# Patient Record
Sex: FEMALE | Race: WHITE | NOT HISPANIC OR LATINO | URBAN - METROPOLITAN AREA
[De-identification: names, ages, dates, MRNs, and addresses within clinical notes are randomized per-mention and may not be internally consistent; named-entity substitution may affect disease eponyms.]

---

## 2015-12-03 VITALS — HEIGHT: 70 IN | DIASTOLIC BLOOD PRESSURE: 70 MMHG | SYSTOLIC BLOOD PRESSURE: 130 MMHG | HEART RATE: 72 BPM

## 2017-08-07 ENCOUNTER — OUTPATIENT (OUTPATIENT)
Dept: OUTPATIENT SERVICES | Facility: HOSPITAL | Age: 77
LOS: 1 days | Discharge: HOME | End: 2017-08-07

## 2017-08-07 DIAGNOSIS — I11.9 HYPERTENSIVE HEART DISEASE WITHOUT HEART FAILURE: ICD-10-CM

## 2017-08-07 DIAGNOSIS — E11.9 TYPE 2 DIABETES MELLITUS WITHOUT COMPLICATIONS: ICD-10-CM

## 2017-08-07 DIAGNOSIS — Z02.9 ENCOUNTER FOR ADMINISTRATIVE EXAMINATIONS, UNSPECIFIED: ICD-10-CM

## 2017-09-08 ENCOUNTER — OUTPATIENT (OUTPATIENT)
Dept: OUTPATIENT SERVICES | Facility: HOSPITAL | Age: 77
LOS: 1 days | Discharge: HOME | End: 2017-09-08

## 2017-09-08 DIAGNOSIS — E11.9 TYPE 2 DIABETES MELLITUS WITHOUT COMPLICATIONS: ICD-10-CM

## 2017-09-08 DIAGNOSIS — I11.9 HYPERTENSIVE HEART DISEASE WITHOUT HEART FAILURE: ICD-10-CM

## 2018-03-06 ENCOUNTER — OUTPATIENT (OUTPATIENT)
Dept: OUTPATIENT SERVICES | Facility: HOSPITAL | Age: 78
LOS: 1 days | Discharge: HOME | End: 2018-03-06

## 2018-03-06 DIAGNOSIS — R94.31 ABNORMAL ELECTROCARDIOGRAM [ECG] [EKG]: ICD-10-CM

## 2018-03-06 DIAGNOSIS — I10 ESSENTIAL (PRIMARY) HYPERTENSION: ICD-10-CM

## 2018-11-02 ENCOUNTER — OUTPATIENT (OUTPATIENT)
Dept: OUTPATIENT SERVICES | Facility: HOSPITAL | Age: 78
LOS: 1 days | Discharge: HOME | End: 2018-11-02

## 2018-11-02 DIAGNOSIS — E61.1 IRON DEFICIENCY: ICD-10-CM

## 2018-11-02 DIAGNOSIS — G47.9 SLEEP DISORDER, UNSPECIFIED: ICD-10-CM

## 2018-11-02 DIAGNOSIS — D50.9 IRON DEFICIENCY ANEMIA, UNSPECIFIED: ICD-10-CM

## 2018-11-02 DIAGNOSIS — Z79.899 OTHER LONG TERM (CURRENT) DRUG THERAPY: ICD-10-CM

## 2018-11-02 DIAGNOSIS — E55.9 VITAMIN D DEFICIENCY, UNSPECIFIED: ICD-10-CM

## 2018-11-02 DIAGNOSIS — R26.0 ATAXIC GAIT: ICD-10-CM

## 2018-11-02 DIAGNOSIS — E53.8 DEFICIENCY OF OTHER SPECIFIED B GROUP VITAMINS: ICD-10-CM

## 2018-11-02 DIAGNOSIS — N39.0 URINARY TRACT INFECTION, SITE NOT SPECIFIED: ICD-10-CM

## 2019-01-09 ENCOUNTER — OUTPATIENT (OUTPATIENT)
Dept: OUTPATIENT SERVICES | Facility: HOSPITAL | Age: 79
LOS: 1 days | Discharge: HOME | End: 2019-01-09

## 2019-01-09 DIAGNOSIS — R78.81 BACTEREMIA: ICD-10-CM

## 2019-01-16 ENCOUNTER — OUTPATIENT (OUTPATIENT)
Dept: OUTPATIENT SERVICES | Facility: HOSPITAL | Age: 79
LOS: 1 days | Discharge: HOME | End: 2019-01-16

## 2019-01-16 DIAGNOSIS — R78.81 BACTEREMIA: ICD-10-CM

## 2019-02-13 ENCOUNTER — OUTPATIENT (OUTPATIENT)
Dept: OUTPATIENT SERVICES | Facility: HOSPITAL | Age: 79
LOS: 1 days | Discharge: HOME | End: 2019-02-13

## 2019-02-13 DIAGNOSIS — R78.81 BACTEREMIA: ICD-10-CM

## 2019-02-21 ENCOUNTER — OUTPATIENT (OUTPATIENT)
Dept: OUTPATIENT SERVICES | Facility: HOSPITAL | Age: 79
LOS: 1 days | Discharge: HOME | End: 2019-02-21

## 2019-02-21 DIAGNOSIS — R78.81 BACTEREMIA: ICD-10-CM

## 2019-02-28 ENCOUNTER — OUTPATIENT (OUTPATIENT)
Dept: OUTPATIENT SERVICES | Facility: HOSPITAL | Age: 79
LOS: 1 days | Discharge: HOME | End: 2019-02-28

## 2019-02-28 DIAGNOSIS — R78.81 BACTEREMIA: ICD-10-CM

## 2019-03-06 ENCOUNTER — OUTPATIENT (OUTPATIENT)
Dept: OUTPATIENT SERVICES | Facility: HOSPITAL | Age: 79
LOS: 1 days | Discharge: HOME | End: 2019-03-06

## 2019-03-06 DIAGNOSIS — M25.571 PAIN IN RIGHT ANKLE AND JOINTS OF RIGHT FOOT: ICD-10-CM

## 2019-03-06 DIAGNOSIS — R78.81 BACTEREMIA: ICD-10-CM

## 2019-03-13 ENCOUNTER — OUTPATIENT (OUTPATIENT)
Dept: OUTPATIENT SERVICES | Facility: HOSPITAL | Age: 79
LOS: 1 days | Discharge: HOME | End: 2019-03-13

## 2019-03-13 DIAGNOSIS — R78.81 BACTEREMIA: ICD-10-CM

## 2019-03-14 ENCOUNTER — OUTPATIENT (OUTPATIENT)
Dept: OUTPATIENT SERVICES | Facility: HOSPITAL | Age: 79
LOS: 1 days | Discharge: HOME | End: 2019-03-14

## 2019-03-14 DIAGNOSIS — R50.9 FEVER, UNSPECIFIED: ICD-10-CM

## 2019-03-15 ENCOUNTER — OUTPATIENT (OUTPATIENT)
Dept: OUTPATIENT SERVICES | Facility: HOSPITAL | Age: 79
LOS: 1 days | Discharge: HOME | End: 2019-03-15

## 2019-03-16 DIAGNOSIS — N39.0 URINARY TRACT INFECTION, SITE NOT SPECIFIED: ICD-10-CM

## 2019-03-20 ENCOUNTER — OUTPATIENT (OUTPATIENT)
Dept: OUTPATIENT SERVICES | Facility: HOSPITAL | Age: 79
LOS: 1 days | Discharge: HOME | End: 2019-03-20

## 2019-03-20 DIAGNOSIS — R78.81 BACTEREMIA: ICD-10-CM

## 2019-03-27 ENCOUNTER — OUTPATIENT (OUTPATIENT)
Dept: OUTPATIENT SERVICES | Facility: HOSPITAL | Age: 79
LOS: 1 days | Discharge: HOME | End: 2019-03-27

## 2019-03-27 DIAGNOSIS — R78.81 BACTEREMIA: ICD-10-CM

## 2019-04-10 ENCOUNTER — OUTPATIENT (OUTPATIENT)
Dept: OUTPATIENT SERVICES | Facility: HOSPITAL | Age: 79
LOS: 1 days | Discharge: HOME | End: 2019-04-10

## 2019-04-10 DIAGNOSIS — R78.81 BACTEREMIA: ICD-10-CM

## 2019-04-24 ENCOUNTER — OUTPATIENT (OUTPATIENT)
Dept: OUTPATIENT SERVICES | Facility: HOSPITAL | Age: 79
LOS: 1 days | Discharge: HOME | End: 2019-04-24

## 2019-04-24 DIAGNOSIS — R78.81 BACTEREMIA: ICD-10-CM

## 2019-05-08 ENCOUNTER — OUTPATIENT (OUTPATIENT)
Dept: OUTPATIENT SERVICES | Facility: HOSPITAL | Age: 79
LOS: 1 days | Discharge: HOME | End: 2019-05-08

## 2019-05-08 DIAGNOSIS — R78.81 BACTEREMIA: ICD-10-CM

## 2019-05-15 ENCOUNTER — OUTPATIENT (OUTPATIENT)
Dept: OUTPATIENT SERVICES | Facility: HOSPITAL | Age: 79
LOS: 1 days | Discharge: HOME | End: 2019-05-15

## 2019-05-15 DIAGNOSIS — R78.81 BACTEREMIA: ICD-10-CM

## 2019-05-22 ENCOUNTER — OUTPATIENT (OUTPATIENT)
Dept: OUTPATIENT SERVICES | Facility: HOSPITAL | Age: 79
LOS: 1 days | Discharge: HOME | End: 2019-05-22

## 2019-05-22 DIAGNOSIS — R78.81 BACTEREMIA: ICD-10-CM

## 2019-06-05 ENCOUNTER — OUTPATIENT (OUTPATIENT)
Dept: OUTPATIENT SERVICES | Facility: HOSPITAL | Age: 79
LOS: 1 days | Discharge: HOME | End: 2019-06-05

## 2019-06-05 DIAGNOSIS — R78.81 BACTEREMIA: ICD-10-CM

## 2019-06-19 ENCOUNTER — OUTPATIENT (OUTPATIENT)
Dept: OUTPATIENT SERVICES | Facility: HOSPITAL | Age: 79
LOS: 1 days | Discharge: HOME | End: 2019-06-19

## 2019-06-19 DIAGNOSIS — R78.81 BACTEREMIA: ICD-10-CM

## 2019-07-03 ENCOUNTER — OUTPATIENT (OUTPATIENT)
Dept: OUTPATIENT SERVICES | Facility: HOSPITAL | Age: 79
LOS: 1 days | Discharge: HOME | End: 2019-07-03

## 2019-07-03 DIAGNOSIS — R78.81 BACTEREMIA: ICD-10-CM

## 2019-07-10 ENCOUNTER — OUTPATIENT (OUTPATIENT)
Dept: OUTPATIENT SERVICES | Facility: HOSPITAL | Age: 79
LOS: 1 days | Discharge: HOME | End: 2019-07-10

## 2019-07-10 DIAGNOSIS — R78.81 BACTEREMIA: ICD-10-CM

## 2019-09-12 ENCOUNTER — OUTPATIENT (OUTPATIENT)
Dept: OUTPATIENT SERVICES | Facility: HOSPITAL | Age: 79
LOS: 1 days | Discharge: HOME | End: 2019-09-12

## 2019-09-12 DIAGNOSIS — E78.00 PURE HYPERCHOLESTEROLEMIA, UNSPECIFIED: ICD-10-CM

## 2019-09-12 DIAGNOSIS — E11.9 TYPE 2 DIABETES MELLITUS WITHOUT COMPLICATIONS: ICD-10-CM

## 2019-09-12 DIAGNOSIS — I11.9 HYPERTENSIVE HEART DISEASE WITHOUT HEART FAILURE: ICD-10-CM

## 2020-02-11 ENCOUNTER — OUTPATIENT (OUTPATIENT)
Dept: OUTPATIENT SERVICES | Facility: HOSPITAL | Age: 80
LOS: 1 days | Discharge: HOME | End: 2020-02-11

## 2020-02-11 DIAGNOSIS — S82.301A UNSPECIFIED FRACTURE OF LOWER END OF RIGHT TIBIA, INITIAL ENCOUNTER FOR CLOSED FRACTURE: ICD-10-CM

## 2020-03-12 ENCOUNTER — OUTPATIENT (OUTPATIENT)
Dept: OUTPATIENT SERVICES | Facility: HOSPITAL | Age: 80
LOS: 1 days | Discharge: HOME | End: 2020-03-12

## 2020-03-12 DIAGNOSIS — S82.301S: ICD-10-CM

## 2020-03-12 DIAGNOSIS — S82.301A UNSPECIFIED FRACTURE OF LOWER END OF RIGHT TIBIA, INITIAL ENCOUNTER FOR CLOSED FRACTURE: ICD-10-CM

## 2020-06-08 PROBLEM — Z00.00 ENCOUNTER FOR PREVENTIVE HEALTH EXAMINATION: Status: ACTIVE | Noted: 2020-06-08

## 2020-06-23 ENCOUNTER — TRANSCRIPTION ENCOUNTER (OUTPATIENT)
Age: 80
End: 2020-06-23

## 2020-06-23 ENCOUNTER — APPOINTMENT (OUTPATIENT)
Dept: GASTROENTEROLOGY | Facility: CLINIC | Age: 80
End: 2020-06-23
Payer: MEDICARE

## 2020-06-23 DIAGNOSIS — Z12.11 ENCOUNTER FOR SCREENING FOR MALIGNANT NEOPLASM OF COLON: ICD-10-CM

## 2020-06-23 DIAGNOSIS — Z78.9 OTHER SPECIFIED HEALTH STATUS: ICD-10-CM

## 2020-06-23 DIAGNOSIS — Z86.79 PERSONAL HISTORY OF OTHER DISEASES OF THE CIRCULATORY SYSTEM: ICD-10-CM

## 2020-06-23 DIAGNOSIS — Z80.0 FAMILY HISTORY OF MALIGNANT NEOPLASM OF DIGESTIVE ORGANS: ICD-10-CM

## 2020-06-23 PROCEDURE — 99204 OFFICE O/P NEW MOD 45 MIN: CPT | Mod: 95

## 2020-06-23 RX ORDER — ASPIRIN 81 MG
81 TABLET, DELAYED RELEASE (ENTERIC COATED) ORAL
Refills: 0 | Status: ACTIVE | COMMUNITY

## 2020-06-23 RX ORDER — POLYETHYLENE GLYCOL 3350 17 G/17G
17 POWDER, FOR SOLUTION ORAL TWICE DAILY
Qty: 1 | Refills: 6 | Status: ACTIVE | COMMUNITY
Start: 2020-06-23 | End: 1900-01-01

## 2020-06-23 RX ORDER — CHOLECALCIFEROL (VITAMIN D3) 25 MCG
TABLET ORAL
Refills: 0 | Status: ACTIVE | COMMUNITY

## 2020-06-23 NOTE — PHYSICAL EXAM
[General Appearance - Alert] : alert [Sclera] : the sclera and conjunctiva were normal [] : no respiratory distress [Oriented To Time, Place, And Person] : oriented to person, place, and time

## 2020-06-23 NOTE — ASSESSMENT
[FreeTextEntry1] : 80 year old female patient average risk for CRC, never had a colonoscopy in her life, presents with worsening constipation over last 2 years. No blood in stools, or abdominal pain. Reports decreased appetite because of depression and this has led to mild weight loss. No nausea, vomiting or reflux. \par \par A/P: \par Worsening constipation \par r/o colon cancer vs sedentary lifestyle\par Needs colonoscopy\par Risks and benefits discussed with patient. \par Pt is not keen now\par Rec: colace 300 at bedtimne\par miralax 2 scoops daily.\par RTC in 4 weeks to re evaluate for colonoscopy.

## 2020-06-23 NOTE — HISTORY OF PRESENT ILLNESS
[Home] : at home, [unfilled] , at the time of the visit. [Medical Office: ([facility])___] : at the medical office located in  [Verbal consent obtained from patient] : the patient, [unfilled] [FreeTextEntry4] : Lesia Lerner [de-identified] : 80 year old female patient average risk for CRC, never had a colonoscopy in her life, presents with worsening constipation over last 2 years. No blood in stools, or abdominal pain. Reports decreased appetite because of depression and this has led to mild weight loss. No nausea, vomiting or reflux. \par

## 2020-07-21 ENCOUNTER — APPOINTMENT (OUTPATIENT)
Dept: GASTROENTEROLOGY | Facility: CLINIC | Age: 80
End: 2020-07-21
Payer: MEDICARE

## 2020-07-21 PROCEDURE — 99214 OFFICE O/P EST MOD 30 MIN: CPT | Mod: 95

## 2020-07-21 NOTE — PHYSICAL EXAM
[Hearing Threshold Finger Rub Not Bath] : hearing was normal [General Appearance - Alert] : alert [] : no respiratory distress [Skin Color & Pigmentation] : normal skin color and pigmentation [Oriented To Time, Place, And Person] : oriented to person, place, and time

## 2020-07-21 NOTE — HISTORY OF PRESENT ILLNESS
Alcohol Intoxication  Alcohol intoxication occurs when you drink alcohol faster than your liver can remove it from your system. The following facts are important to remember:  · It can take 10 minutes or more to start to feel the effects of a drink, so you can easily get more intoxicated than you intended.  · One drink may be more than 1 serving of alcohol. Depending on the drink, it can be 2 to 4 servings.  · It takes about an hour for your body to metabolize (clear) 1 serving. If you have more than 1 drink, it can take a couple of hours or more.  · Many things affect how drinks will affect you, including whether you’ve eaten, how fast you drink, your size, how much you normally drink (or not), medications you take, chronic diseases you have, and gender.  Signs and symptoms of alcohol poisoning  The following are signs and symptoms of alcohol poisoning:  Mild impairment  · Reduced inhibitions  · Slurred speech  · Drowsiness  · Decreased fine motor skills  Moderate impairment  · Erratic behavior, aggression, depression  · Impaired judgment  · Confusion  · Concentration difficulties  · Coordination problems  Severe impairment  · Vomiting  · Seizures  · Unconsciousness  · Cold, clammy  · Slow or irregular breathing  · Hypothermia (low body temperature)  · Coma  Health effects  Alcohol abuse causes health problems. Sometimes this can happen after only drinking a “little.\" There is no set number of drinks or amount of alcohol that defines too much. The more you drink at one time, and the more frequently you drink determine both the short-term and long-term health effects. It affects all parts of your body and your health, including your:  · Brain. Alcohol is a central nervous system depressant. It can damage parts of the brain that affect your balance, memory, thinking, and emotions. It can cause memory loss, blackouts, depression, agitation, sleep cycle changes, and seizures. These changes may or may not be  reversible.  · Heart and vascular system. Alcohol affects multiple areas. It can damage heart muscle causing cardiomyopathy, which is a weakening and stretching of the heart muscle. This can lead to trouble breathing, an irregular heartbeat, atrial fibrillation, leg swelling, and heart failure. It makes the blood vessels stiffen causing hypertension (high blood pressure). All of these problems increase your risk of having heart attacks or strokes.  · Liver. Alcohol causes fat to build up in the liver, affecting its normal function. This increases the risk for hepatitis, leading to abdominal pain, appetite loss, jaundice, bleeding problems, liver fibrosis, and cirrhosis. This in turn can affect your ability to fight off infections, and can cause diabetes. The liver changes prevent it from removing toxins in your blood that can cause encephalopathy. Signs of this are confusion, altered level of consciousness, personality changes, memory loss, seizures, coma, and death.  · Pancreas. Alcohol can cause inflammation of the pancreas, or pancreatitis. This can cause pain in your abdomen, fever, and diabetes.  · Immune system. Alcohol weakens your immune system in a number of ways. It suppresses your immune system making it harder to fight off infections and colds. You will also have a higher risk of certain infections like pneumonia and tuberculosis.  · Cancer risk. Alcohol raises your risk of cancer of the mouth, esophagus, pharynx, larynx, liver, and breast.  · Sexual function. Alcohol abuse can also lead to sexual problems.  Alcohol use during pregnancy may cause permanent damage to the growing baby.  Home care  The following guidelines will help you care for yourself at home:  · Don't drink any more alcohol.  · Don't drive until all effects of the alcohol have worn off.  · Don't operate machinery that can cause injuries.  · Get lots of rest over the next few days. Drink plenty of water and other non-alcoholic liquids.  Try to eat regular meals.  · If you have been drinking heavily on a daily basis, you may go through alcohol withdrawal. The usual symptoms last 3 to 4 days and may include nervousness, shakiness, nausea, sweating, sleeplessness, and can even cause seizures and a serious withdrawal symptom called delirium tremens, or DTs. During this time, it is best that you stay with family or friends who can help and support you. You can also admit yourself to a residential detox program. If your symptoms are severe (seizures, severe shakiness, confusion), contact your doctor or call an ambulance for help (see below).   Follow-up care  If alcohol is a problem in your life, these are some organizations that can help you:  · Alcoholics Anonymous offers support through a self-help fellowship. There are no dues or fees. See the Yellow Pages and call for time and place of meetings. Find AA online at www.aa.org.  · [employer] offers support to families of alcohol users. Contact 613-961-4470, or online at www.al-anon.org.  · National [place] on Alcoholism and Drug Dependence can be reached at 164-016-8783, or online at www.ncadd.org.  · There are also inpatient and residential alcohol detox programs. Check the Internet or phonebook Yellow Pages under “Drug Abuse and Treatment Centers.”  Call 911  Call 911 if any of these occur:  · Trouble breathing or slow irregular breathing  · Chest pain  · Sudden weakness on one side of your body or sudden trouble speaking  · Heavy bleeding or vomiting blood  · Very drowsy or trouble awakening  · Fainting or loss of consciousness  · Rapid heart rate  · Seizure  When to seek medical care  Get prompt medical attention if any of the following occur:  · Severe shakiness   · Fever over 100.4º F (38.0º C)  · Confusion or hallucinations (seeing, hearing, or feeling things that are not there)  · Pain in your upper abdomen that gets worse  · Repeated vomiting  © 6426-2307 [employer]. [address] [place] [address]  [place] [address]. All rights reserved. This information is not intended as a substitute for professional medical care. Always follow your healthcare professional's instructions.         [Home] : at home, [unfilled] , at the time of the visit. [Medical Office: ([facility])___] : at the medical office located in  [Verbal consent obtained from patient] : the patient, [unfilled] [FreeTextEntry4] : Lesia Lerner [de-identified] : 80 year old female patient average risk for CRC, never had a colonoscopy in her life, presents with worsening constipation over last 2 years. No blood in stools, or abdominal pain. Reports decreased appetite because of depression and this has led to mild weight loss. No nausea, vomiting or reflux. \par Reports being better on colace and miralax.\par

## 2020-07-21 NOTE — ASSESSMENT
[FreeTextEntry1] : 80 year old female patient average risk for CRC, never had a colonoscopy in her life, presents with worsening constipation over last 2 years. No blood in stools, or abdominal pain. Reports decreased appetite because of depression and this has led to mild weight loss. No nausea, vomiting or reflux. \par \par A/P: \par Worsening constipation \par r/o colon cancer vs sedentary lifestyle\par Needed a colonoscopy, Pt was not keen now\par Now better on colace 300 at bedtimne\par miralax 2 scoops daily.\par No alarm signs.\par Continue same meds.\par RTC PRN

## 2021-12-22 ENCOUNTER — APPOINTMENT (OUTPATIENT)
Dept: GERIATRICS | Facility: HOME HEALTH | Age: 81
End: 2021-12-22

## 2022-01-19 ENCOUNTER — APPOINTMENT (OUTPATIENT)
Dept: GERIATRICS | Facility: HOME HEALTH | Age: 82
End: 2022-01-19
Payer: MEDICARE

## 2022-01-19 VITALS
BODY MASS INDEX: 28 KG/M2 | HEIGHT: 71 IN | RESPIRATION RATE: 18 BRPM | DIASTOLIC BLOOD PRESSURE: 68 MMHG | SYSTOLIC BLOOD PRESSURE: 142 MMHG | OXYGEN SATURATION: 98 % | HEART RATE: 64 BPM | WEIGHT: 200 LBS | TEMPERATURE: 98.2 F

## 2022-01-19 PROCEDURE — 99343: CPT

## 2022-01-19 RX ORDER — UBIDECARENONE 200 MG
CAPSULE ORAL
Refills: 0 | Status: DISCONTINUED | COMMUNITY
End: 2022-01-19

## 2022-01-19 RX ORDER — DOCUSATE SODIUM 100 MG/1
CAPSULE ORAL
Refills: 0 | Status: DISCONTINUED | COMMUNITY
End: 2022-01-19

## 2022-01-19 RX ORDER — DOCUSATE SODIUM 100 MG/1
100 CAPSULE ORAL
Qty: 90 | Refills: 6 | Status: DISCONTINUED | COMMUNITY
Start: 2020-06-23 | End: 2022-01-19

## 2022-01-19 RX ORDER — METOPROLOL TARTRATE 50 MG/1
50 TABLET, FILM COATED ORAL
Refills: 0 | Status: DISCONTINUED | COMMUNITY
End: 2022-01-19

## 2022-01-19 RX ORDER — OMEGA-3/DHA/EPA/FISH OIL 35-113.5MG
1000 TABLET,CHEWABLE ORAL
Refills: 0 | Status: ACTIVE | COMMUNITY

## 2022-01-19 NOTE — HISTORY OF PRESENT ILLNESS
[FreeTextEntry1] : Patient seen and examined at home.  Patient is homebound 2/2 generalized weakness/debility.  81F w/PMHx HTN, MDD, Post-polio syndrome, ?CAD seen for initial home visit.Patient reports at baseline. No acute complaints.  No CP/SOB. No abdominal complaints with good appetite and regular BM's.  No urinary complaints.  No recent falls.  No skin wounds.  Patient does not ambulate, uses WC.  Patient lives with Dtr.  Has HHA 6h5/d.  Patient follows with Psych and Cards.  Patient with postpolio syndrome, complaining of BLE Neuropathy, unable to tolerate Gabapentin 2/2 edema

## 2022-01-19 NOTE — ASSESSMENT
[FreeTextEntry1] : HTN, ?CAD\par - follows with Cards, Dr. Aldana\par - unknown Hx CAD but on BB and ASA, no statin, no previous hx PCI\par - c/w BB/ASA\par \par MDD\par - c/w Lexapro but patient hasn't taken any\par - outpatient Psych FU as scheduled, suggested to try Cymbalta for both psych and PN symptoms\par \par Postpolio Syndrome/Gait Dysfunction\par - WC bound\par - PT/OT c/s\par - Fall precautions

## 2022-01-19 NOTE — PHYSICAL EXAM
[Alert] : alert [No Acute Distress] : in no acute distress [Normal Outer Ear/Nose] : the ears and nose were normal in appearance [Normal Appearance] : the appearance of the neck was normal [Supple] : the neck was supple [No Respiratory Distress] : no respiratory distress [No Acc Muscle Use] : no accessory muscle use [Respiration, Rhythm And Depth] : normal respiratory rhythm and effort [Auscultation Breath Sounds / Voice Sounds] : lungs were clear to auscultation bilaterally [Normal S1, S2] : normal S1 and S2 [Heart Rate And Rhythm] : heart rate was normal and rhythm regular [Edema] : edema was not present [Abdomen Tenderness] : non-tender [Abdomen Soft] : soft [No Spinal Tenderness] : no spinal tenderness [No Clubbing, Cyanosis] : no clubbing or cyanosis of the fingernails [Involuntary Movements] : no involuntary movements were seen [] : no rash [Skin Lesions] : no skin lesions [No Focal Deficits] : no focal deficits [Oriented To Time, Place, And Person] : oriented to person, place, and time [de-identified] : +BLE weakness

## 2022-01-27 ENCOUNTER — NON-APPOINTMENT (OUTPATIENT)
Age: 82
End: 2022-01-27

## 2022-01-27 LAB
25(OH)D3 SERPL-MCNC: 35 NG/ML
ALBUMIN SERPL ELPH-MCNC: 3.7 G/DL
ALP BLD-CCNC: 53 U/L
ALT SERPL-CCNC: 6 U/L
ANION GAP SERPL CALC-SCNC: 10 MMOL/L
AST SERPL-CCNC: 15 U/L
BASOPHILS # BLD AUTO: 0.03 K/UL
BASOPHILS NFR BLD AUTO: 0.7 %
BILIRUB SERPL-MCNC: 0.3 MG/DL
BUN SERPL-MCNC: 11 MG/DL
CALCIUM SERPL-MCNC: 9.9 MG/DL
CHLORIDE SERPL-SCNC: 102 MMOL/L
CHOLEST SERPL-MCNC: 154 MG/DL
CO2 SERPL-SCNC: 24 MMOL/L
CREAT SERPL-MCNC: <0.5 MG/DL
EOSINOPHIL # BLD AUTO: 0.15 K/UL
EOSINOPHIL NFR BLD AUTO: 3.6 %
ESTIMATED AVERAGE GLUCOSE: 94 MG/DL
FOLATE SERPL-MCNC: 2.9 NG/ML
GLUCOSE SERPL-MCNC: 86 MG/DL
HBA1C MFR BLD HPLC: 4.9 %
HCT VFR BLD CALC: 35.4 %
HDLC SERPL-MCNC: 59 MG/DL
HGB BLD-MCNC: 11.7 G/DL
IMM GRANULOCYTES NFR BLD AUTO: 0.2 %
LDLC SERPL CALC-MCNC: 86 MG/DL
LYMPHOCYTES # BLD AUTO: 1.4 K/UL
LYMPHOCYTES NFR BLD AUTO: 33.9 %
MAN DIFF?: NORMAL
MCHC RBC-ENTMCNC: 29.5 PG
MCHC RBC-ENTMCNC: 33.1 G/DL
MCV RBC AUTO: 89.4 FL
MONOCYTES # BLD AUTO: 0.45 K/UL
MONOCYTES NFR BLD AUTO: 10.9 %
NEUTROPHILS # BLD AUTO: 2.09 K/UL
NEUTROPHILS NFR BLD AUTO: 50.7 %
NONHDLC SERPL-MCNC: 95 MG/DL
PLATELET # BLD AUTO: 211 K/UL
POTASSIUM SERPL-SCNC: 4 MMOL/L
PROT SERPL-MCNC: 5.7 G/DL
RBC # BLD: 3.96 M/UL
RBC # FLD: 13.8 %
SODIUM SERPL-SCNC: 136 MMOL/L
T3FREE SERPL-MCNC: 2.52 PG/ML
T4 FREE SERPL-MCNC: 1.4 NG/DL
TRIGL SERPL-MCNC: 49 MG/DL
TSH SERPL-ACNC: 0.38 UIU/ML
VIT B12 SERPL-MCNC: 1051 PG/ML
WBC # FLD AUTO: 4.13 K/UL

## 2022-04-24 ENCOUNTER — APPOINTMENT (OUTPATIENT)
Dept: GERIATRICS | Facility: HOME HEALTH | Age: 82
End: 2022-04-24
Payer: MEDICARE

## 2022-04-24 VITALS
SYSTOLIC BLOOD PRESSURE: 140 MMHG | OXYGEN SATURATION: 99 % | RESPIRATION RATE: 17 BRPM | TEMPERATURE: 98 F | DIASTOLIC BLOOD PRESSURE: 80 MMHG | HEART RATE: 60 BPM

## 2022-04-24 PROCEDURE — 99348 HOME/RES VST EST LOW MDM 30: CPT

## 2022-04-24 NOTE — HISTORY OF PRESENT ILLNESS
[Patient] : patient [Family Member] : family member [FreeTextEntry1] : generalized weakness/debility.  [FreeTextEntry2] : Patient seen and examined at home. Patient is homebound 2/2 generalized weakness/debility. 82F w/PMHx HTN, MDD, Post-polio syndrome, ?CAD seen for initial home visit.Patient reports at baseline. No acute complaints. No CP/SOB. No abdominal complaints with good appetite and regular BM's. No urinary complaints. No recent falls. No skin wounds. Patient does not ambulate, uses WC. Patient lives with Dtr. Has HHA 6h5/d. Patient follows with Psych and Cards. Patient with postpolio syndrome, complaining of BLE Neuropathy, unable to tolerate Gabapentin 2/2 edema.\par Patient and daughter both state that BLE edema worsened about 2 weeks ago. Upon assessment significant BLE edema noted, Rt >Lt. Patient agrees with duplex studdy.\par Otherwise patient is well, denies any pain, good appetite.\par  \par

## 2022-04-24 NOTE — ASSESSMENT
[FreeTextEntry1] : BLE edema\par -DVT duplex studdy\par \par HTN, ?CAD\par - follows with Cards, Dr. Aldana\par - unknown Hx CAD but on BB and ASA, no statin, no previous hx PCI\par - c/w BB/ASA\par \par MDD\par - c/w Lexapro but patient hasn't taken any\par - outpatient Psych FU as scheduled, suggested to try Cymbalta for both psych and PN symptoms\par \par Postpolio Syndrome/Gait Dysfunction\par - WC bound\par - PT/OT c/s\par - Fall precautions.\par \par RTC in 3months

## 2022-04-24 NOTE — PHYSICAL EXAM
[No Acute Distress] : no acute distress [PERRL] : pupils equal, round and reactive to light [Normal Outer Ear/Nose] : the ears and nose were normal in appearance [No JVD] : no jugular venous distention [Clear to Auscultation] : lungs were clear to auscultation bilaterally [Normal S1, S2] : normal S1 and S2 [Non Tender] : non-tender [Soft] : abdomen soft [No Joint Swelling] : no joint swelling seen [No Rash] : no rash [de-identified] : BLE edema  [de-identified] : bladder non-palpable

## 2022-04-24 NOTE — REVIEW OF SYSTEMS
[Muscle Weakness] : muscle weakness [Negative] : ENT [Chest Pain] : no chest pain [Palpitations] : no palpitations [Wheezing] : no wheezing [Cough] : no cough [Abdominal Pain] : no abdominal pain [Nausea] : no nausea [Dysuria] : no dysuria [Joint Pain] : no joint pain [Skin Rash] : no skin rash [Headache] : no headache

## 2022-05-25 ENCOUNTER — APPOINTMENT (OUTPATIENT)
Dept: GERIATRICS | Facility: HOME HEALTH | Age: 82
End: 2022-05-25
Payer: MEDICARE

## 2022-05-25 VITALS
RESPIRATION RATE: 18 BRPM | SYSTOLIC BLOOD PRESSURE: 120 MMHG | TEMPERATURE: 98.2 F | HEART RATE: 66 BPM | DIASTOLIC BLOOD PRESSURE: 80 MMHG | OXYGEN SATURATION: 99 %

## 2022-05-25 DIAGNOSIS — T30.0 BURN OF UNSPECIFIED BODY REGION, UNSPECIFIED DEGREE: ICD-10-CM

## 2022-05-25 DIAGNOSIS — T24.211A BURN OF SECOND DEGREE OF RIGHT THIGH, INITIAL ENCOUNTER: ICD-10-CM

## 2022-05-25 PROCEDURE — 99348 HOME/RES VST EST LOW MDM 30: CPT
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
## 2022-07-28 ENCOUNTER — APPOINTMENT (OUTPATIENT)
Dept: GERIATRICS | Facility: HOME HEALTH | Age: 82
End: 2022-07-28

## 2022-07-28 PROCEDURE — 99442: CPT | Mod: 95

## 2022-09-18 ENCOUNTER — APPOINTMENT (OUTPATIENT)
Dept: GERIATRICS | Facility: HOME HEALTH | Age: 82
End: 2022-09-18

## 2022-09-18 PROCEDURE — 99349 HOME/RES VST EST MOD MDM 40: CPT

## 2022-09-19 VITALS
HEART RATE: 60 BPM | TEMPERATURE: 98 F | OXYGEN SATURATION: 97 % | DIASTOLIC BLOOD PRESSURE: 75 MMHG | RESPIRATION RATE: 17 BRPM | SYSTOLIC BLOOD PRESSURE: 120 MMHG

## 2022-09-19 DIAGNOSIS — R15.9 FULL INCONTINENCE OF FECES: ICD-10-CM

## 2022-09-19 DIAGNOSIS — N39.0 URINARY TRACT INFECTION, SITE NOT SPECIFIED: ICD-10-CM

## 2022-09-19 DIAGNOSIS — Z29.8 ENCOUNTER FOR OTHER SPECIFIED PROPHYLACTIC MEASURES: ICD-10-CM

## 2022-09-19 DIAGNOSIS — R32 UNSPECIFIED URINARY INCONTINENCE: ICD-10-CM

## 2022-09-19 NOTE — REVIEW OF SYSTEMS
[Chest Pain] : no chest pain [Shortness Of Breath] : no shortness of breath [Cough] : no cough [Abdominal Pain] : no abdominal pain [Constipation] : no constipation [Skin Rash] : no skin rash [Headache] : no headache

## 2022-09-19 NOTE — HISTORY OF PRESENT ILLNESS
[FreeTextEntry1] : generalized weakness, debility  [FreeTextEntry2] : 82F w/PMHx HTN, MDD, Post-polio syndrome, ?CAD.\par patient c/o foul smelling urine, and some discomfort with urination. recently had uti, took bactrim, states it helped her. but because of incontinence of bowel-high risk of recurrent utis. \par \par   No CP/SOB. No abdominal complaints with good appetite and regular BM's. No recent falls. Patient does not ambulate, uses WC. very difficult to transfer, would benefit from greg lift. Patient lives with Dtr. Has HHA . Patient follows with Psych and Cards. Patient with postpolio syndrome, complaining of BLE Neuropathy, unable to tolerate Gabapentin 2/2 edema.\par \par Offered flu vaccine, patient refused. \par \par

## 2022-09-19 NOTE — ASSESSMENT
[FreeTextEntry1] : #UTI\par -Bactrim x 5days\par -then Hiprex for ppx\par \par HTN, ?CAD\par - follows with Cards, Dr. Aldana\par - unknown Hx CAD but on BB and ASA, no statin, no previous hx PCI\par - c/w BB/ASA\par \par MDD\par - c/w Lexapro but patient hasn't taken any\par - outpatient Psych FU as scheduled, suggested to try Cymbalta for both psych and PN symptoms\par \par Postpolio Syndrome/Gait Dysfunction\par -greg lift \par - WC bound\par - PT/OT c/s\par - Fall precautions.\par \par RTC in 3months. \par \par

## 2022-09-26 ENCOUNTER — LABORATORY RESULT (OUTPATIENT)
Age: 82
End: 2022-09-26

## 2022-10-13 LAB
ALBUMIN SERPL ELPH-MCNC: 4.3 G/DL
ALP BLD-CCNC: 67 U/L
ALT SERPL-CCNC: 7 U/L
ANION GAP SERPL CALC-SCNC: 10 MMOL/L
APPEARANCE: ABNORMAL
AST SERPL-CCNC: 13 U/L
BACTERIA UR CULT: ABNORMAL
BASOPHILS # BLD AUTO: 0.06 K/UL
BASOPHILS NFR BLD AUTO: 1.2 %
BILIRUB SERPL-MCNC: 0.4 MG/DL
BILIRUBIN URINE: NEGATIVE
BLOOD URINE: NEGATIVE
BUN SERPL-MCNC: 12 MG/DL
CALCIUM SERPL-MCNC: 9.9 MG/DL
CHLORIDE SERPL-SCNC: 100 MMOL/L
CO2 SERPL-SCNC: 21 MMOL/L
COLOR: YELLOW
CREAT SERPL-MCNC: 0.5 MG/DL
EGFR: 93 ML/MIN/1.73M2
EOSINOPHIL # BLD AUTO: 0.18 K/UL
EOSINOPHIL NFR BLD AUTO: 3.7 %
GLUCOSE QUALITATIVE U: NEGATIVE
GLUCOSE SERPL-MCNC: 92 MG/DL
HCT VFR BLD CALC: 33.7 %
HGB BLD-MCNC: 11.2 G/DL
IMM GRANULOCYTES NFR BLD AUTO: 0.4 %
KETONES URINE: NEGATIVE
LEUKOCYTE ESTERASE URINE: ABNORMAL
LYMPHOCYTES # BLD AUTO: 1.37 K/UL
LYMPHOCYTES NFR BLD AUTO: 28.2 %
MAN DIFF?: NORMAL
MCHC RBC-ENTMCNC: 29.2 PG
MCHC RBC-ENTMCNC: 33.2 G/DL
MCV RBC AUTO: 87.8 FL
MONOCYTES # BLD AUTO: 0.54 K/UL
MONOCYTES NFR BLD AUTO: 11.1 %
NEUTROPHILS # BLD AUTO: 2.68 K/UL
NEUTROPHILS NFR BLD AUTO: 55.4 %
NITRITE URINE: NEGATIVE
PH URINE: 6
PLATELET # BLD AUTO: 218 K/UL
POTASSIUM SERPL-SCNC: 4.6 MMOL/L
PROT SERPL-MCNC: 6.1 G/DL
PROTEIN URINE: NORMAL
RBC # BLD: 3.84 M/UL
RBC # FLD: 14.2 %
SODIUM SERPL-SCNC: 131 MMOL/L
SPECIFIC GRAVITY URINE: 1.02
UROBILINOGEN URINE: NORMAL
WBC # FLD AUTO: 4.85 K/UL

## 2022-12-01 ENCOUNTER — APPOINTMENT (OUTPATIENT)
Dept: GERIATRICS | Facility: HOME HEALTH | Age: 82
End: 2022-12-01

## 2022-12-01 VITALS
TEMPERATURE: 98.6 F | RESPIRATION RATE: 18 BRPM | HEART RATE: 56 BPM | SYSTOLIC BLOOD PRESSURE: 140 MMHG | DIASTOLIC BLOOD PRESSURE: 68 MMHG | OXYGEN SATURATION: 98 %

## 2022-12-01 PROCEDURE — 99349 HOME/RES VST EST MOD MDM 40: CPT

## 2022-12-01 RX ORDER — ESCITALOPRAM OXALATE 5 MG/1
5 TABLET ORAL
Refills: 0 | Status: DISCONTINUED | COMMUNITY
End: 2022-12-01

## 2022-12-01 RX ORDER — DULOXETINE HYDROCHLORIDE 20 MG/1
20 CAPSULE, DELAYED RELEASE PELLETS ORAL
Qty: 90 | Refills: 0 | Status: ACTIVE | COMMUNITY
Start: 2022-09-26

## 2022-12-01 RX ORDER — AMLODIPINE BESYLATE 5 MG/1
5 TABLET ORAL
Qty: 90 | Refills: 0 | Status: ACTIVE | COMMUNITY
Start: 2022-09-14

## 2022-12-01 NOTE — ASSESSMENT
[FreeTextEntry1] : #HTN, ?CAD\par - follows with Cards, Dr. Aldana\par - unknown Hx CAD but on BB and ASA, no statin, no previous hx PCI\par - c/w BB/ASA\par - Amlodipine added for elevated BP, will monitor\par \par #MDD\par - c/w Cymbalta\par - outpatient Psych FU as scheduled\par \par #Postpolio Syndrome/Gait Dysfunction\par - WC bound\par - c/w PT/OT \par - Fall precautions.\par \par #BLE edema\par - started after Amlodipine, likely ADR\par - will contact Cards for further guidance, FU 12/13\par - BLE elevation\par - Compression stockings BLE\par - doubt CHF

## 2022-12-01 NOTE — PHYSICAL EXAM
[Alert] : alert [Normal Outer Ear/Nose] : the ears and nose were normal in appearance [Normal Appearance] : the appearance of the neck was normal [Supple] : the neck was supple [No Respiratory Distress] : no respiratory distress [No Acc Muscle Use] : no accessory muscle use [Respiration, Rhythm And Depth] : normal respiratory rhythm and effort [Auscultation Breath Sounds / Voice Sounds] : lungs were clear to auscultation bilaterally [Normal S1, S2] : normal S1 and S2 [Heart Rate And Rhythm] : heart rate was normal and rhythm regular [Edema] : edema was not present [Abdomen Tenderness] : non-tender [Abdomen Soft] : soft [No Spinal Tenderness] : no spinal tenderness [No Clubbing, Cyanosis] : no clubbing or cyanosis of the fingernails [Involuntary Movements] : no involuntary movements were seen [] : no rash [Skin Lesions] : no skin lesions [No Focal Deficits] : no focal deficits [Oriented To Time, Place, And Person] : oriented to person, place, and time [de-identified] : +BLE weakness

## 2022-12-01 NOTE — HISTORY OF PRESENT ILLNESS
[FreeTextEntry1] : Patient seen and examined at home.  Patient is homebound 2/2 generalized weakness/debility.  Dtr present during visit.  All report patient at baseline.  No acute complaints.  No CP/SOB. No abdominal complaints with good appetite and regular BM's.  No urinary complaints.  No recent falls.  No skin wounds.  Put on PPX ABX (Methanamine) for UTI, no s/s since.

## 2022-12-20 ENCOUNTER — RX RENEWAL (OUTPATIENT)
Age: 82
End: 2022-12-20

## 2023-01-31 ENCOUNTER — NON-APPOINTMENT (OUTPATIENT)
Age: 83
End: 2023-01-31

## 2023-01-31 DIAGNOSIS — I65.29 OCCLUSION AND STENOSIS OF UNSPECIFIED CAROTID ARTERY: ICD-10-CM

## 2023-01-31 DIAGNOSIS — Z87.891 PERSONAL HISTORY OF NICOTINE DEPENDENCE: ICD-10-CM

## 2023-03-17 ENCOUNTER — APPOINTMENT (OUTPATIENT)
Dept: GERIATRICS | Facility: HOME HEALTH | Age: 83
End: 2023-03-17
Payer: MEDICARE

## 2023-03-17 VITALS
SYSTOLIC BLOOD PRESSURE: 122 MMHG | OXYGEN SATURATION: 100 % | RESPIRATION RATE: 16 BRPM | DIASTOLIC BLOOD PRESSURE: 62 MMHG | TEMPERATURE: 97.2 F | HEART RATE: 60 BPM

## 2023-03-17 PROCEDURE — 99349 HOME/RES VST EST MOD MDM 40: CPT

## 2023-03-17 NOTE — PHYSICAL EXAM
[Alert] : alert [Normal Outer Ear/Nose] : the ears and nose were normal in appearance [Normal Appearance] : the appearance of the neck was normal [Supple] : the neck was supple [No Respiratory Distress] : no respiratory distress [No Acc Muscle Use] : no accessory muscle use [Respiration, Rhythm And Depth] : normal respiratory rhythm and effort [Auscultation Breath Sounds / Voice Sounds] : lungs were clear to auscultation bilaterally [Normal S1, S2] : normal S1 and S2 [Heart Rate And Rhythm] : heart rate was normal and rhythm regular [Abdomen Tenderness] : non-tender [Abdomen Soft] : soft [No Spinal Tenderness] : no spinal tenderness [No Clubbing, Cyanosis] : no clubbing or cyanosis of the fingernails [] : no rash [Involuntary Movements] : no involuntary movements were seen [Skin Lesions] : no skin lesions [No Focal Deficits] : no focal deficits [Oriented To Time, Place, And Person] : oriented to person, place, and time [de-identified] : 2+ BLE pitting edema [de-identified] : +BLE weakness

## 2023-03-17 NOTE — HISTORY OF PRESENT ILLNESS
[FreeTextEntry1] : Patient seen and examined at home.  Patient is homebound 2/2 generalized weakness/debility.  Dtr and HHA present during visit.  All report patient at baseline.  No acute complaints.  No CP/SOB. No abdominal complaints with good appetite and regular BM's.  No urinary complaints.  No recent falls.  No skin wounds.  Patient now unable to ambulate, uses hospital bed and greg lift now.  Still with edema, controlled with elevation, still on Amlodipine.

## 2023-03-17 NOTE — ASSESSMENT
[FreeTextEntry1] : #HTN, ?CAD\par - follows with Cards, Dr. Aldana\par - unknown Hx CAD but on BB and ASA, no statin, no previous hx PCI\par - c/w BB/ASA\par - Amlodipine added for elevated BP, will monitor\par \par #MDD\par - c/w Cymbalta\par - outpatient Psych FU as scheduled\par \par #Postpolio Syndrome/Gait Dysfunction\par - WC bound\par - c/w PT/OT \par - Fall precautions.\par \par #BLE edema\par - SE of Amlodipine\par - never contacted Cards, wants to continue\par - BLE elevation\par - Compression stockings BLE\par \par

## 2023-04-21 ENCOUNTER — RX RENEWAL (OUTPATIENT)
Age: 83
End: 2023-04-21

## 2023-04-21 RX ORDER — METHENAMINE HIPPURATE 1 G/1
1 TABLET ORAL TWICE DAILY
Qty: 60 | Refills: 3 | Status: ACTIVE | COMMUNITY
Start: 2022-09-19 | End: 1900-01-01

## 2023-05-12 ENCOUNTER — INPATIENT (INPATIENT)
Facility: HOSPITAL | Age: 83
LOS: 3 days | Discharge: HOME HEALTH | DRG: 291 | End: 2023-05-16
Attending: HOSPITALIST | Admitting: HOSPITALIST
Payer: MEDICARE

## 2023-05-12 VITALS
WEIGHT: 293 LBS | HEIGHT: 71 IN | RESPIRATION RATE: 20 BRPM | SYSTOLIC BLOOD PRESSURE: 179 MMHG | TEMPERATURE: 98 F | DIASTOLIC BLOOD PRESSURE: 84 MMHG | HEART RATE: 58 BPM | OXYGEN SATURATION: 98 %

## 2023-05-12 DIAGNOSIS — E87.70 FLUID OVERLOAD, UNSPECIFIED: ICD-10-CM

## 2023-05-12 LAB
ALBUMIN SERPL ELPH-MCNC: 4.3 G/DL — SIGNIFICANT CHANGE UP (ref 3.5–5.2)
ALP SERPL-CCNC: 81 U/L — SIGNIFICANT CHANGE UP (ref 30–115)
ALT FLD-CCNC: 10 U/L — SIGNIFICANT CHANGE UP (ref 0–41)
ANION GAP SERPL CALC-SCNC: 11 MMOL/L — SIGNIFICANT CHANGE UP (ref 7–14)
AST SERPL-CCNC: 20 U/L — SIGNIFICANT CHANGE UP (ref 0–41)
BASOPHILS # BLD AUTO: 0.06 K/UL — SIGNIFICANT CHANGE UP (ref 0–0.2)
BASOPHILS NFR BLD AUTO: 1 % — SIGNIFICANT CHANGE UP (ref 0–1)
BILIRUB SERPL-MCNC: 0.5 MG/DL — SIGNIFICANT CHANGE UP (ref 0.2–1.2)
BUN SERPL-MCNC: 11 MG/DL — SIGNIFICANT CHANGE UP (ref 10–20)
CALCIUM SERPL-MCNC: 10.3 MG/DL — SIGNIFICANT CHANGE UP (ref 8.4–10.5)
CHLORIDE SERPL-SCNC: 97 MMOL/L — LOW (ref 98–110)
CO2 SERPL-SCNC: 25 MMOL/L — SIGNIFICANT CHANGE UP (ref 17–32)
CREAT SERPL-MCNC: 0.5 MG/DL — LOW (ref 0.7–1.5)
EGFR: 92 ML/MIN/1.73M2 — SIGNIFICANT CHANGE UP
EOSINOPHIL # BLD AUTO: 0.23 K/UL — SIGNIFICANT CHANGE UP (ref 0–0.7)
EOSINOPHIL NFR BLD AUTO: 3.7 % — SIGNIFICANT CHANGE UP (ref 0–8)
GLUCOSE SERPL-MCNC: 97 MG/DL — SIGNIFICANT CHANGE UP (ref 70–99)
HCT VFR BLD CALC: 34.1 % — LOW (ref 37–47)
HGB BLD-MCNC: 11.2 G/DL — LOW (ref 12–16)
IMM GRANULOCYTES NFR BLD AUTO: 0.6 % — HIGH (ref 0.1–0.3)
LYMPHOCYTES # BLD AUTO: 1.33 K/UL — SIGNIFICANT CHANGE UP (ref 1.2–3.4)
LYMPHOCYTES # BLD AUTO: 21.2 % — SIGNIFICANT CHANGE UP (ref 20.5–51.1)
MAGNESIUM SERPL-MCNC: 2.1 MG/DL — SIGNIFICANT CHANGE UP (ref 1.8–2.4)
MCHC RBC-ENTMCNC: 29.6 PG — SIGNIFICANT CHANGE UP (ref 27–31)
MCHC RBC-ENTMCNC: 32.8 G/DL — SIGNIFICANT CHANGE UP (ref 32–37)
MCV RBC AUTO: 90.2 FL — SIGNIFICANT CHANGE UP (ref 81–99)
MONOCYTES # BLD AUTO: 0.58 K/UL — SIGNIFICANT CHANGE UP (ref 0.1–0.6)
MONOCYTES NFR BLD AUTO: 9.2 % — SIGNIFICANT CHANGE UP (ref 1.7–9.3)
NEUTROPHILS # BLD AUTO: 4.04 K/UL — SIGNIFICANT CHANGE UP (ref 1.4–6.5)
NEUTROPHILS NFR BLD AUTO: 64.3 % — SIGNIFICANT CHANGE UP (ref 42.2–75.2)
NRBC # BLD: 0 /100 WBCS — SIGNIFICANT CHANGE UP (ref 0–0)
NT-PROBNP SERPL-SCNC: 843 PG/ML — HIGH (ref 0–300)
PLATELET # BLD AUTO: 212 K/UL — SIGNIFICANT CHANGE UP (ref 130–400)
PMV BLD: 10.3 FL — SIGNIFICANT CHANGE UP (ref 7.4–10.4)
POTASSIUM SERPL-MCNC: 4.8 MMOL/L — SIGNIFICANT CHANGE UP (ref 3.5–5)
POTASSIUM SERPL-SCNC: 4.8 MMOL/L — SIGNIFICANT CHANGE UP (ref 3.5–5)
PROT SERPL-MCNC: 6.6 G/DL — SIGNIFICANT CHANGE UP (ref 6–8)
RBC # BLD: 3.78 M/UL — LOW (ref 4.2–5.4)
RBC # FLD: 13.9 % — SIGNIFICANT CHANGE UP (ref 11.5–14.5)
SODIUM SERPL-SCNC: 133 MMOL/L — LOW (ref 135–146)
TROPONIN T SERPL-MCNC: <0.01 NG/ML — SIGNIFICANT CHANGE UP
WBC # BLD: 6.28 K/UL — SIGNIFICANT CHANGE UP (ref 4.8–10.8)
WBC # FLD AUTO: 6.28 K/UL — SIGNIFICANT CHANGE UP (ref 4.8–10.8)

## 2023-05-12 PROCEDURE — 99285 EMERGENCY DEPT VISIT HI MDM: CPT | Mod: FS

## 2023-05-12 PROCEDURE — 82728 ASSAY OF FERRITIN: CPT

## 2023-05-12 PROCEDURE — 82746 ASSAY OF FOLIC ACID SERUM: CPT

## 2023-05-12 PROCEDURE — 80053 COMPREHEN METABOLIC PANEL: CPT

## 2023-05-12 PROCEDURE — 83735 ASSAY OF MAGNESIUM: CPT

## 2023-05-12 PROCEDURE — 97162 PT EVAL MOD COMPLEX 30 MIN: CPT | Mod: GP

## 2023-05-12 PROCEDURE — 84443 ASSAY THYROID STIM HORMONE: CPT

## 2023-05-12 PROCEDURE — 97166 OT EVAL MOD COMPLEX 45 MIN: CPT | Mod: GO

## 2023-05-12 PROCEDURE — 82533 TOTAL CORTISOL: CPT

## 2023-05-12 PROCEDURE — 85027 COMPLETE CBC AUTOMATED: CPT

## 2023-05-12 PROCEDURE — 83550 IRON BINDING TEST: CPT

## 2023-05-12 PROCEDURE — 80048 BASIC METABOLIC PNL TOTAL CA: CPT

## 2023-05-12 PROCEDURE — 99223 1ST HOSP IP/OBS HIGH 75: CPT

## 2023-05-12 PROCEDURE — 85045 AUTOMATED RETICULOCYTE COUNT: CPT

## 2023-05-12 PROCEDURE — 97530 THERAPEUTIC ACTIVITIES: CPT | Mod: GP

## 2023-05-12 PROCEDURE — 82607 VITAMIN B-12: CPT

## 2023-05-12 PROCEDURE — 93306 TTE W/DOPPLER COMPLETE: CPT

## 2023-05-12 PROCEDURE — 36415 COLL VENOUS BLD VENIPUNCTURE: CPT

## 2023-05-12 PROCEDURE — 71045 X-RAY EXAM CHEST 1 VIEW: CPT | Mod: 26

## 2023-05-12 PROCEDURE — 93970 EXTREMITY STUDY: CPT

## 2023-05-12 PROCEDURE — 97110 THERAPEUTIC EXERCISES: CPT | Mod: GP

## 2023-05-12 PROCEDURE — 83540 ASSAY OF IRON: CPT

## 2023-05-12 RX ORDER — LANOLIN ALCOHOL/MO/W.PET/CERES
3 CREAM (GRAM) TOPICAL AT BEDTIME
Refills: 0 | Status: DISCONTINUED | OUTPATIENT
Start: 2023-05-12 | End: 2023-05-16

## 2023-05-12 RX ORDER — ENOXAPARIN SODIUM 100 MG/ML
40 INJECTION SUBCUTANEOUS EVERY 24 HOURS
Refills: 0 | Status: DISCONTINUED | OUTPATIENT
Start: 2023-05-12 | End: 2023-05-16

## 2023-05-12 RX ORDER — ACETAMINOPHEN 500 MG
650 TABLET ORAL EVERY 6 HOURS
Refills: 0 | Status: DISCONTINUED | OUTPATIENT
Start: 2023-05-12 | End: 2023-05-16

## 2023-05-12 RX ORDER — METOPROLOL TARTRATE 50 MG
50 TABLET ORAL DAILY
Refills: 0 | Status: DISCONTINUED | OUTPATIENT
Start: 2023-05-12 | End: 2023-05-16

## 2023-05-12 RX ORDER — ONDANSETRON 8 MG/1
4 TABLET, FILM COATED ORAL EVERY 8 HOURS
Refills: 0 | Status: DISCONTINUED | OUTPATIENT
Start: 2023-05-12 | End: 2023-05-16

## 2023-05-12 RX ORDER — FUROSEMIDE 40 MG
40 TABLET ORAL DAILY
Refills: 0 | Status: DISCONTINUED | OUTPATIENT
Start: 2023-05-12 | End: 2023-05-14

## 2023-05-12 RX ORDER — DULOXETINE HYDROCHLORIDE 30 MG/1
20 CAPSULE, DELAYED RELEASE ORAL DAILY
Refills: 0 | Status: DISCONTINUED | OUTPATIENT
Start: 2023-05-12 | End: 2023-05-16

## 2023-05-12 RX ORDER — FUROSEMIDE 40 MG
40 TABLET ORAL ONCE
Refills: 0 | Status: COMPLETED | OUTPATIENT
Start: 2023-05-12 | End: 2023-05-12

## 2023-05-12 RX ORDER — ASPIRIN/CALCIUM CARB/MAGNESIUM 324 MG
81 TABLET ORAL DAILY
Refills: 0 | Status: DISCONTINUED | OUTPATIENT
Start: 2023-05-12 | End: 2023-05-16

## 2023-05-12 RX ORDER — MULTIVIT-MIN/FERROUS GLUCONATE 9 MG/15 ML
15 LIQUID (ML) ORAL DAILY
Refills: 0 | Status: DISCONTINUED | OUTPATIENT
Start: 2023-05-12 | End: 2023-05-16

## 2023-05-12 RX ADMIN — Medication 40 MILLIGRAM(S): at 16:30

## 2023-05-12 RX ADMIN — ENOXAPARIN SODIUM 40 MILLIGRAM(S): 100 INJECTION SUBCUTANEOUS at 21:45

## 2023-05-12 NOTE — PATIENT PROFILE ADULT - FALL HARM RISK - HARM RISK INTERVENTIONS
Assistance OOB with selected safe patient handling equipment/Communicate Risk of Fall with Harm to all staff/Discuss with provider need for PT consult/Monitor gait and stability/Provide patient with walking aids - walker, cane, crutches/Reinforce activity limits and safety measures with patient and family/Tailored Fall Risk Interventions/Visual Cue: Yellow wristband and red socks/Bed in lowest position, wheels locked, appropriate side rails in place/Call bell, personal items and telephone in reach/Instruct patient to call for assistance before getting out of bed or chair/Non-slip footwear when patient is out of bed/[place] to call system/Physically safe environment - no spills, clutter or unnecessary equipment/Purposeful Proactive Rounding/Room/bathroom lighting operational, light cord in reach Assistance with ambulation/Assistance OOB with selected safe patient handling equipment/Communicate Risk of Fall with Harm to all staff/Discuss with provider need for PT consult/Monitor gait and stability/Provide patient with walking aids - walker, cane, crutches/Reinforce activity limits and safety measures with patient and family/Tailored Fall Risk Interventions/Visual Cue: Yellow wristband and red socks/Bed in lowest position, wheels locked, appropriate side rails in place/Call bell, personal items and telephone in reach/Instruct patient to call for assistance before getting out of bed or chair/Non-slip footwear when patient is out of bed/[place] to call system/Physically safe environment - no spills, clutter or unnecessary equipment/Purposeful Proactive Rounding/Room/bathroom lighting operational, light cord in reach

## 2023-05-12 NOTE — H&P ADULT - NSHPREVIEWOFSYSTEMS_GEN_ALL_CORE
REVIEW OF SYSTEMS:    CONSTITUTIONAL:  No weakness, fevers or chills  EYES/ENT:  No visual changes;  No vertigo or throat pain   NECK:  No pain or stiffness  RESPIRATORY:  No cough, wheezing, hemoptysis; No shortness of breath  CARDIOVASCULAR:  No chest pain or palpitations  GASTROINTESTINAL:  No abdominal or epigastric pain. No nausea, vomiting, or hematemesis; No diarrhea or constipation. No melena or hematochezia.  GENITOURINARY:  No dysuria, frequency or hematuria  NEUROLOGICAL:  No numbness or weakness  SKIN:  No itching, rashes REVIEW OF SYSTEMS:    CONSTITUTIONAL:  No weakness, fevers or chills  EYES/ENT:  No visual changes;  No vertigo or throat pain   NECK:  No pain or stiffness  RESPIRATORY:  No cough, wheezing, hemoptysis; No shortness of breath  CARDIOVASCULAR:  No chest pain or palpitations, upper and lower extremity edema acute on chronic   GASTROINTESTINAL:  No abdominal or epigastric pain. No nausea, vomiting, or hematemesis; No diarrhea or constipation. No melena or hematochezia.  GENITOURINARY:  No dysuria, frequency or hematuria  NEUROLOGICAL:  No numbness or weakness  SKIN:  No itching, rashes

## 2023-05-12 NOTE — PATIENT PROFILE ADULT - MEDICATIONS/VISITS
no

## 2023-05-12 NOTE — H&P ADULT - NSHPPHYSICALEXAM_GEN_ALL_CORE
GENERAL: NAD, lying in bed comfortably  HEAD:  Atraumatic, Normocephalic  EYES: EOMI, PERRLA, conjunctiva and sclera clear  ENT: Moist mucous membranes  NECK: Supple, No JVD  CHEST/LUNG: Clear to auscultation bilaterally; No rales, rhonchi, wheezing, or rubs. Unlabored respirations  HEART: Regular rate and rhythm; No murmurs, rubs, or gallops  ABDOMEN: Bowel sounds present; Soft, Nontender, Nondistended. No hepatomegally  EXTREMITIES:  2+ Peripheral Pulses, brisk capillary refill. No clubbing, cyanosis, or edema  NERVOUS SYSTEM:  Alert & Oriented X3, speech clear. No deficits   MSK: FROM all 4 extremities, full and equal strength  SKIN: No rashes or lesions GENERAL: NAD, lying in bed comfortably not on O2  HEAD:  Atraumatic, Normocephalic  EYES: EOMI, conjunctiva and sclera clear  NECK: Supple,   CHEST/LUNG: Clear to auscultation bilaterally;   HEART: Regular rate and rhythm;   ABDOMEN: Bowel sounds present; Soft, Nontender, Nondistended.   EXTREMITIES:  2+ Peripheral Pulses, 3+ edema n LE , nonpitting Ue edema   NERVOUS SYSTEM:  Alert & Oriented X3, speech clear.   MSK: FROM all 4 extremities, full and equal strength  SKIN: No rashes or lesions

## 2023-05-12 NOTE — ED ADULT NURSE NOTE - OBJECTIVE STATEMENT
pt came in c/o lower extremities swelling without improvement despite medication administration . pt bedbound at baseline.

## 2023-05-12 NOTE — ED PROVIDER NOTE - ATTENDING APP SHARED VISIT CONTRIBUTION OF CARE
84 yo f with pmh of htn and chf, presents with swelling of body despite increasing lasix.  pt says was seen by pmd and went up from 20 to 40 of lasix since the swelling started 2 weeks ago.  pt denies cp or sob.  denies f/c, n/v/d, abd pain.  no leg pain.  exam: nad, ncat, perrl, eomi, mmm, rrr, ctab, abd soft, nt, nd aox3, anasarca imp: pt with anasarca, fluid overload, failure outpt tx, will admit for iv diuresis.

## 2023-05-12 NOTE — ED ADULT TRIAGE NOTE - CHIEF COMPLAINT QUOTE
pt brought to ED c/o increase fluid retention. daughter reports swelling began in legs but progressively getting worse, no relief with prescribed diuretic. associated with some SOB

## 2023-05-12 NOTE — ED PROVIDER NOTE - CLINICAL SUMMARY MEDICAL DECISION MAKING FREE TEXT BOX
pt with anasarca, fluid overload, failure outpt tx, will admit for iv diuresis.  Any ordered labs and EKG were reviewed.  Any imaging was ordered and reviewed by me.  Appropriate medications for patient's presenting complaints were ordered and effects were reassessed.  Patient's records (prior hospital, ED visit, and/or nursing home notes if available) were reviewed.  Additional history was obtained from EMS, family, and/or PCP (where available).  Escalation to admission/observation was considered.   Patient requires inpatient hospitalization - monitored setting.

## 2023-05-12 NOTE — ED ADULT NURSE NOTE - NSFALLHARMRISKINTERV_ED_ALL_ED
Assistance OOB with selected safe patient handling equipment if applicable/Assistance with ambulation/Communicate risk of Fall with Harm to all staff, patient, and family/Monitor gait and stability/Provide patient with walking aids/Provide visual cue: red socks, yellow wristband, yellow gown, etc/Reinforce activity limits and safety measures with patient and family/Use of alarms - bed, stretcher, chair and/or video monitoring/Bed in lowest position, wheels locked, appropriate side rails in place/Call bell, personal items and telephone in reach/Instruct patient to call for assistance before getting out of bed/chair/stretcher/Non-slip footwear applied when patient is off stretcher/[place] to call system/Physically safe environment - no spills, clutter or unnecessary equipment/Purposeful Proactive Rounding/Room/bathroom lighting operational, light cord in reach

## 2023-05-12 NOTE — ED PROVIDER NOTE - OBJECTIVE STATEMENT
83-year-old female with past medical history of fluid overload and hypertension who presents with bilateral leg swelling.  Reports that symptoms started 2 weeks ago.  Patient was seen by her primary care doctor and increased her Lasix from 20 mg a day to 40 mg a day since 3 days ago.  Denies fever, shortness breath, chest pain, nausea, vomiting, abdominal pain, urinary symptoms, change in bowel movement.

## 2023-05-12 NOTE — ED PROVIDER NOTE - PHYSICAL EXAMINATION
CONSTITUTIONAL: Well-appearing; in no apparent distress.   HEAD: Normocephalic; atraumatic.   EYES: Pupils are round and reactive, extra-ocular muscles are intact. Eyelids are normal in appearance without swelling or lesions.   ENT: Hearing is intact with good acuity to spoken voice.  Patient is speaking clearly, not muffled and airway is intact.   RESPIRATORY: No signs of respiratory distress. Lung sounds are clear in all lobes bilaterally without rales, rhonchi, or wheezes.  CARDIOVASCULAR: Regular rate and rhythm. B/L leg swelling with pitting edema noticed.   GI: Abdomen is soft, non-tender, and without distention. Bowel sounds are present and normoactive in all four quadrants. No masses are noted.   NEURO: A & O x 3. Normal speech. No focal deficit.  PSYCHOLOGICAL: Appropriate mood and affect. Good judgement and insight.

## 2023-05-12 NOTE — H&P ADULT - ASSESSMENT
83-year-old female  PMH: CAD, LE edema , HTN, MDD  Presents for worsening LE edema     # LE edema   #HTN   # CAD  - follows with Cards, Dr. Aldana  - c/w  BB and ASA    - holding amlodipine   - compression stockings     - f/u LE duplex   - f/u TTE   - f/u cardio consult     #MDD  - c/w Cymbalta    #Postpolio Syndrome/Gait Dysfunction  - WC bound  - c/w PT/OT   - Fall precautions.        # DVT : Lovenox  # Diet : DASH  # Activity : As deni   # Dispo : form home   # Code : Full  83-year-old female  PMH: CAD, LE edema , HTN, MDD  Presents for worsening LE edema     # LE edema   #HTN   # CAD  - follows with Cards, Dr. Aldana  - c/w  BB and ASA    - holding amlodipine   - compression stockings   - Lasix 40 IV daily     - f/u LE duplex   - f/u TTE   - f/u cardio consult     #MDD  - c/w Cymbalta    #Postpolio Syndrome/Gait Dysfunction  - WC bound  - c/w PT/OT   - Fall precautions.        # DVT : Lovenox  # Diet : DASH  # Activity : As deni   # Dispo : form home   # Code : Full  83-year-old female  PMH: CAD, LE edema , HTN, MDD, post polio wheelchair bound   Presents for worsening LE edema     # UE/LE edema - acute on chronic   # HTN   # CAD  - follows with Cards, Dr. Aldana  - c/w  BB and ASA    - holding amlodipine   - compression stockings   - Lasix 40 IV daily     - f/u LE duplex   - f/u TTE   - f/u cardio consult     #MDD  - c/w Cymbalta    #Postpolio Syndrome/Gait Dysfunction  - WC bound  - c/w PT/OT   - Fall precautions.        # DVT : Lovenox  # Diet : DASH  # Activity : As deni - wheelchair bound at baseline - f/u PT   # Dispo : from home   # Code : Full

## 2023-05-12 NOTE — H&P ADULT - ATTENDING COMMENTS
82 y/o F PMH  LE Edema, HTN,  MDD,  Post  Polio Wheel  presented for lower extremity Edema.     Agree  with assessment  except for changes below.     IMPRESSION  Generalized Edema Non Pitting   Described as  Acute on Chronic   Etiology Unknown  F/u Echo    S/P Dose of Lasix Given   Send TSH, Cortisol Level, VA duplex  Consider  Compressions   Hold  Amlodipine as this can cause Lower  Extremity Edema     Hx  CAD Unspecified - BB and  ASA     Hx MDD - Cymbalta     Hx Postpolio Syndrome/Gait Dysfunction  WheelChair bound   Fall Precaution   Proper assistive measures 84 y/o F PMH  LE Edema, HTN,  MDD,  Post  Polio Wheel  presented for lower extremity Edema.     Agree  with assessment  except for changes below.     VITAL SIGNS: AFebrile, vital signs stable  CONSTITUTIONAL: Well-developed; well-nourished; in no acute distress.  SKIN: Skin exam is warm and dry, no acute rash. Obesity  HEAD: Normocephalic; atraumatic.  EYES: Extraocular movements intact  ENT: No nasal discharge; airway clear. Moist mucus membranes.  NECK: Supple; non tender. No rigidity  CARD: Rregular rate and rhythm. Normal S1, S2; no murmurs, gallops, or rubs.  RESP: On  auscultation basilar crackles  bilaterally. No wheezes, rales or rhonchi.  ABD: Abdomen soft; non-tender; non-distended  EXT:  No clubbing,  3+ edema.   NEURO:at  basline   PSYCH: cooperative, appropriate.     IMPRESSION   Lower Extremity Edema  Pitting  vs Lymphedema   Described as  Acute on Chronic   No SOB but patient lives a Sedentary Life   Etiology Unknown   ( Obese)  F/u Echo    Continue  Lasix  40mg Qd for now   Send TSH, Cortisol Level, VA duplex  Consider  Compressions   Hold  Amlodipine as this can cause Lower  Extremity Edema     Hx  CAD Unspecified - BB and  ASA     Hx MDD - Cymbalta     Hx Postpolio Syndrome/Gait Dysfunction  Wheelchair bound   Fall Precaution   Proper assistive measures    Seen on 05/12/23

## 2023-05-12 NOTE — ED ADULT NURSE NOTE - CAS TRG GEN SKIN CONDITION
[address] [facility] Encounter      [address] [facility]       Chief Complaint   Patient presents with    Pharyngitis     X 2 days, swollen tonsil, whiteness in toung, denies pain when swallowing, states it is nagging        Nurses Notes reviewed and I agree except as noted in the HPI. HISTORY OF PRESENT ILLNESS   Ashley Leonard is a 24 y.o. female who presents   Pharyngitis  This is a new problem. The current episode started yesterday. The problem occurs constantly. The problem has been gradually worsening. Associated symptoms include a sore throat. Pertinent negatives include no abdominal pain, anorexia, arthralgias, change in bowel habit, chest pain, chills, congestion, coughing, diaphoresis, fatigue, fever, headaches, joint swelling, myalgias, nausea, neck pain, numbness, rash, swollen glands, urinary symptoms, vertigo, visual change, vomiting or weakness. Nothing aggravates the symptoms. She has tried nothing for the symptoms. Recently started vaping, never been a smoker, stopped using vape pen    REVIEW OF SYSTEMS     Review of Systems   Constitutional:  Negative for activity change, appetite change, chills, diaphoresis, fatigue and fever. HENT:  Positive for sore throat. Negative for congestion, ear pain, postnasal drip, rhinorrhea, sinus pressure, sinus pain and trouble swallowing. Tongue coated   Eyes:  Negative for visual disturbance. Respiratory:  Negative for cough, chest tightness, shortness of breath and wheezing. Cardiovascular:  Negative for chest pain and palpitations. Gastrointestinal:  Negative for abdominal distention, abdominal pain, anorexia, change in bowel habit, constipation, diarrhea, nausea and vomiting. Genitourinary:  Negative for difficulty urinating, dysuria, flank pain and frequency. Musculoskeletal:  Negative for arthralgias, joint swelling, myalgias and neck pain. Skin:  Negative for color change and rash.    Neurological:  Negative for dizziness, vertigo, tremors, seizures, syncope, speech difficulty, weakness, light-headedness, numbness and headaches. PAST MEDICAL HISTORY         Diagnosis Date    ADHD     ritalin stopped at preganancy    RUBIA (generalized anxiety disorder) 3/20/2019    Hyperthyroidism 6/1/2022    Rh incompatibility     rhogam given at 28 weeks       SURGICAL HISTORY     Patient  has no past surgical history on file. CURRENT MEDICATIONS       Previous Medications    METHIMAZOLE (TAPAZOLE) 10 MG TABLET    Take 1 tablet by mouth 2 times daily    METHYLPHENIDATE (RITALIN LA) 40 MG EXTENDED RELEASE CAPSULE    Take 1 capsule by mouth in the morning for 30 days. NORETHINDRONE-ETHINYL ESTRADIOL (MICROGESTIN FE 1/20) 1-20 MG-MCG PER TABLET    Take 1 tablet by mouth daily for 28 days    PROPRANOLOL (INDERAL LA) 60 MG EXTENDED RELEASE CAPSULE    Take 1 capsule by mouth daily    TRIAMCINOLONE (KENALOG) 0.1 % CREAM    Apply topically 2 times daily. ALLERGIES     Patientis has No Known Allergies. FAMILY HISTORY     Patient's family history is not on file. SOCIAL HISTORY     Patient  reports that she has never smoked. She has never used smokeless tobacco. She reports that she does not drink alcohol and does not use drugs. PHYSICAL EXAM     ED TRIAGE VITALS  BP: 136/82, Temp: 99.5 °F (37.5 °C), Heart Rate: (!) 108,  , SpO2: 98 %  Physical Exam  Constitutional:       General: She is not in acute distress. Appearance: Normal appearance. She is normal weight. She is not ill-appearing, toxic-appearing or diaphoretic. HENT:      Head: Normocephalic and atraumatic. Right Ear: Hearing, tympanic membrane, ear canal and external ear normal.      Left Ear: Hearing, tympanic membrane, ear canal and external ear normal.      Nose: Nose normal.      Mouth/Throat:      Lips: Pink. Mouth: Mucous membranes are moist.      Pharynx: Oropharynx is clear. Uvula midline. Posterior oropharyngeal erythema present.  No pharyngeal swelling, oropharyngeal exudate or uvula swelling. Tonsils: Tonsillar exudate present. Comments: Tongue coating  Eyes:      General:         Right eye: No discharge. Left eye: No discharge. Conjunctiva/sclera: Conjunctivae normal.      Pupils: Pupils are equal, round, and reactive to light. Cardiovascular:      Rate and Rhythm: Normal rate and regular rhythm. Pulses: Normal pulses. Pulmonary:      Effort: Pulmonary effort is normal.      Breath sounds: Normal breath sounds. Musculoskeletal:         General: No tenderness or signs of injury. Normal range of motion. Cervical back: Normal range of motion and neck supple. No rigidity or tenderness. Skin:     General: Skin is warm and dry. Capillary Refill: Capillary refill takes less than 2 seconds. Neurological:      General: No focal deficit present. Mental Status: She is alert and oriented to person, place, and time. Mental status is at baseline. Cranial Nerves: No cranial nerve deficit. Sensory: No sensory deficit. Motor: No weakness. Coordination: Coordination normal.       DIAGNOSTICRESULTS   Labs:   Covid - neg  Strep - neg    IMAGING:    URGENT CARE COURSE:     Vitals:    07/28/22 1521   BP: 136/82   Site: Right Upper Arm   Pulse: (!) 108   Temp: 99.5 °F (37.5 °C)   SpO2: 98%   Weight: 175 lb (79.4 kg)   Height: 5' 2\" (1.575 m)         PROCEDURES:  None  FINAL IMPRESSION      1. Exudative tonsillitis        DISPOSITION/PLAN   DISPOSITION    Extended discharge instructions provided to patient including signs, symptoms and red flags that they should return to the emergency department. They express good understanding. Standard anticipatory guidance given to patient upon discharge. Have given them a specific time frame in which to follow-up and who to follow-up with.  I have also advised them that they should return to the emergency department if they get worse, or not getting better or develop any new or concerning symptoms. Patient demonstrates understanding and all questions were answered. PATIENT REFERRED TO:  No follow-ups on file. DISCHARGE MEDICATIONS:  New Prescriptions    AZITHROMYCIN (ZITHROMAX) 250 MG TABLET    Take 2 tabs DAY 1 then 1 tab DAYS 2-5    NYSTATIN (MYCOSTATIN) 187995 UNIT/ML SUSPENSION    Take 5 mLs by mouth in the morning and 5 mLs at noon and 5 mLs in the evening and 5 mLs before bedtime. Do all this for 7 days. Cannot display discharge medications since this is not an admission.       Madelin Pa, APRN - CNP [place]

## 2023-05-12 NOTE — H&P ADULT - NSHPPOADEEPVENOUSTHROMB_GEN_A_CORE
no [Alert] : alert [No Acute Distress] : no acute distress [Normocephalic] : normocephalic [EOMI Bilateral] : EOMI bilateral [Clear tympanic membranes with bony landmarks and light reflex present bilaterally] : clear tympanic membranes with bony landmarks and light reflex present bilaterally  [Pink Nasal Mucosa] : pink nasal mucosa [Nonerythematous Oropharynx] : nonerythematous oropharynx [Supple, full passive range of motion] : supple, full passive range of motion [No Palpable Masses] : no palpable masses [Clear to Auscultation Bilaterally] : clear to auscultation bilaterally [Regular Rate and Rhythm] : regular rate and rhythm [Normal S1, S2 audible] : normal S1, S2 audible [No Murmurs] : no murmurs [+2 Femoral Pulses] : +2 femoral pulses [Soft] : soft [NonTender] : non tender [Non Distended] : non distended [Normoactive Bowel Sounds] : normoactive bowel sounds [No Hepatomegaly] : no hepatomegaly [No Splenomegaly] : no splenomegaly [Alexis: _____] : Alexis [unfilled] [Circumcised] : circumcised [No Abnormal Lymph Nodes Palpated] : no abnormal lymph nodes palpated [Normal Muscle Tone] : normal muscle tone [No Gait Asymmetry] : no gait asymmetry [No pain or deformities with palpation of bone, muscles, joints] : no pain or deformities with palpation of bone, muscles, joints [Straight] : straight [No Scoliosis] : no scoliosis [+2 Patella DTR] : +2 patella DTR [Cranial Nerves Grossly Intact] : cranial nerves grossly intact [No Rash or Lesions] : no rash or lesions

## 2023-05-12 NOTE — H&P ADULT - HISTORY OF PRESENT ILLNESS
83-year-old female  PMH: fluid overload and hypertension    Presents with bilateral leg swelling.    Reports that symptoms started 2 weeks ago.    Patient was seen by her primary care doctor and increased her Lasix from 20 mg a day to 40 mg a day since 3 days ago.    Denies fever, shortness breath, chest pain, nausea, vomiting, abdominal pain, urinary symptoms, change in bowel movement.    In the ED :  BP: 179/84  HR : 58  RR : 20  T : 98  O2: 98% on RA     Na : 133  Trop < 0.01x1   BNP: 843  CXR: No radiographic evidence of acute cardiopulmonary disease.  ECG : Sinus rhythm, Left ventricular hypertrophy, Abnormal R wave progression

## 2023-05-12 NOTE — ED PROVIDER NOTE - PROGRESS NOTE DETAILS
Patient is admitted for fluid overload. Pt is aware of the plan and agrees. Pt has been endorsed to MAR.

## 2023-05-12 NOTE — H&P ADULT - NSHPLABSRESULTS_GEN_ALL_CORE
(05-12 @ 14:43)                      11.2  6.28 )-----------( 212                 34.1    Neutrophils = 4.04 (64.3%)  Lymphocytes = 1.33 (21.2%)  Eosinophils = 0.23 (3.7%)  Basophils = 0.06 (1.0%)  Monocytes = 0.58 (9.2%)  Bands = --%    05-12    133<L>  |  97<L>  |  11  ----------------------------<  97  4.8   |  25  |  0.5<L>    Ca    10.3      12 May 2023 14:43  Mg     2.1     05-12    TPro  6.6  /  Alb  4.3  /  TBili  0.5  /  DBili  x   /  AST  20  /  ALT  10  /  AlkPhos  81  05-12      CARDIAC MARKERS ( 12 May 2023 14:43 )  Trop <0.01 ng/mL / CK x     / CKMB x

## 2023-05-13 LAB
ANION GAP SERPL CALC-SCNC: 9 MMOL/L — SIGNIFICANT CHANGE UP (ref 7–14)
BUN SERPL-MCNC: 13 MG/DL — SIGNIFICANT CHANGE UP (ref 10–20)
CALCIUM SERPL-MCNC: 10.1 MG/DL — SIGNIFICANT CHANGE UP (ref 8.4–10.5)
CHLORIDE SERPL-SCNC: 98 MMOL/L — SIGNIFICANT CHANGE UP (ref 98–110)
CO2 SERPL-SCNC: 27 MMOL/L — SIGNIFICANT CHANGE UP (ref 17–32)
CREAT SERPL-MCNC: 0.5 MG/DL — LOW (ref 0.7–1.5)
EGFR: 92 ML/MIN/1.73M2 — SIGNIFICANT CHANGE UP
GLUCOSE SERPL-MCNC: 74 MG/DL — SIGNIFICANT CHANGE UP (ref 70–99)
IRON SATN MFR SERPL: 18 % — SIGNIFICANT CHANGE UP (ref 15–50)
IRON SATN MFR SERPL: 52 UG/DL — SIGNIFICANT CHANGE UP (ref 35–150)
MAGNESIUM SERPL-MCNC: 1.9 MG/DL — SIGNIFICANT CHANGE UP (ref 1.8–2.4)
POTASSIUM SERPL-MCNC: 3.8 MMOL/L — SIGNIFICANT CHANGE UP (ref 3.5–5)
POTASSIUM SERPL-SCNC: 3.8 MMOL/L — SIGNIFICANT CHANGE UP (ref 3.5–5)
RBC # BLD: 3.67 M/UL — LOW (ref 4.2–5.4)
RETICS #: 49.2 K/UL — SIGNIFICANT CHANGE UP (ref 25–125)
RETICS/RBC NFR: 1.3 % — SIGNIFICANT CHANGE UP (ref 0.5–1.5)
SODIUM SERPL-SCNC: 134 MMOL/L — LOW (ref 135–146)
TIBC SERPL-MCNC: 282 UG/DL — SIGNIFICANT CHANGE UP (ref 220–430)
TSH SERPL-MCNC: 1.24 UIU/ML — SIGNIFICANT CHANGE UP (ref 0.27–4.2)
UIBC SERPL-MCNC: 230 UG/DL — SIGNIFICANT CHANGE UP (ref 110–370)

## 2023-05-13 PROCEDURE — 99232 SBSQ HOSP IP/OBS MODERATE 35: CPT

## 2023-05-13 RX ADMIN — Medication 81 MILLIGRAM(S): at 12:33

## 2023-05-13 RX ADMIN — Medication 50 MILLIGRAM(S): at 05:25

## 2023-05-13 RX ADMIN — Medication 40 MILLIGRAM(S): at 05:25

## 2023-05-13 RX ADMIN — ENOXAPARIN SODIUM 40 MILLIGRAM(S): 100 INJECTION SUBCUTANEOUS at 22:05

## 2023-05-13 RX ADMIN — DULOXETINE HYDROCHLORIDE 20 MILLIGRAM(S): 30 CAPSULE, DELAYED RELEASE ORAL at 13:06

## 2023-05-13 NOTE — PROGRESS NOTE ADULT - SUBJECTIVE AND OBJECTIVE BOX
SUBJECTIVE:    Patient is a 83y old Female who presents with a chief complaint of   Currently admitted to medicine with the primary diagnosis of Fluid overload       Today is hospital day 1d. This morning she is resting comfortably in bed and reports no new issues or overnight events.     PAST MEDICAL & SURGICAL HISTORY    SOCIAL HISTORY:  Negative for smoking/alcohol/drug use.     ALLERGIES:  gabapentin (Swelling (Moderate))    MEDICATIONS:  STANDING MEDICATIONS  aspirin  chewable 81 milliGRAM(s) Oral daily  DULoxetine 20 milliGRAM(s) Oral daily  enoxaparin Injectable 40 milliGRAM(s) SubCutaneous every 24 hours  furosemide   Injectable 40 milliGRAM(s) IV Push daily  metoprolol succinate ER 50 milliGRAM(s) Oral daily  multivitamin/minerals/iron Oral Solution (CENTRUM) 15 milliLiter(s) Oral daily    PRN MEDICATIONS  acetaminophen     Tablet .. 650 milliGRAM(s) Oral every 6 hours PRN  aluminum hydroxide/magnesium hydroxide/simethicone Suspension 30 milliLiter(s) Oral every 4 hours PRN  melatonin 3 milliGRAM(s) Oral at bedtime PRN  ondansetron Injectable 4 milliGRAM(s) IV Push every 8 hours PRN    VITALS:   T(F): 96.5  HR: 57  BP: 165/72  RR: 18  SpO2: 100%    PHYSICAL EXAM:  GEN: No acute distress  LUNGS: Clear to auscultation bilaterally   HEART: S1/S2 present.    ABD: Soft, non-tender, non-distended. Bowel sounds present         LABS:                        11.2   6.28  )-----------( 212      ( 12 May 2023 14:43 )             34.1     05-12    133<L>  |  97<L>  |  11  ----------------------------<  97  4.8   |  25  |  0.5<L>    Ca    10.3      12 May 2023 14:43  Mg     2.1     05-12    TPro  6.6  /  Alb  4.3  /  TBili  0.5  /  DBili  x   /  AST  20  /  ALT  10  /  AlkPhos  81  05-12            Troponin T, Serum: <0.01 ng/mL (05-12-23 @ 14:43)      CARDIAC MARKERS ( 12 May 2023 14:43 )  x     / <0.01 ng/mL / x     / x     / x            RADIOLOGY:

## 2023-05-13 NOTE — OCCUPATIONAL THERAPY INITIAL EVALUATION ADULT - IMPAIRED TRANSFERS: BED/CHAIR, REHAB EVAL
poor endurance/impaired balance/decreased flexibility/impaired motor control/decreased ROM/decreased strength

## 2023-05-13 NOTE — OCCUPATIONAL THERAPY INITIAL EVALUATION ADULT - BED MOBILITY TRAINING, PT EVAL
Pt will perform rolling in bed with max A x 1 to aid in self pressure relief by discharge to decrease risk of skin break down

## 2023-05-13 NOTE — PROGRESS NOTE ADULT - ASSESSMENT
Lower Extremity Edema  Pitting  vs Lymphedema   Described as  Acute on Chronic   No SOB but patient lives a Sedentary Life   Etiology Unknown   ( Obese)  F/u Echo    Continue  Lasix  40mg Qd for now   Send TSH, Cortisol Level, VA duplex  Consider  Compressions   Hold  Amlodipine as this can cause Lower  Extremity Edema     Hx  CAD Unspecified - BB and  ASA     Hx MDD - Cymbalta     Hx Postpolio Syndrome/Gait Dysfunction  Wheelchair bound   Fall Precaution   Proper assistive measures    Azeem Justice MD  Attending Hospitalist

## 2023-05-13 NOTE — OCCUPATIONAL THERAPY INITIAL EVALUATION ADULT - NSACTIVITYREC_GEN_A_OT
Patient requires assistance with activities of daily living. OT recommends D/C to home with assistance when medically appropriate. Recommend use of Cair boots to optimize LE positioning for continued use of sit<>stand lift upon d/c home. Refer to evaluation/treatment for details.

## 2023-05-13 NOTE — OCCUPATIONAL THERAPY INITIAL EVALUATION ADULT - ADDITIONAL COMMENTS
Pt lives in 1 bedroom condo with daughter, no stairs, w/c dependent. Has hospital bed, sit<>stand lift, recliner, w/c, commode, + bathtub not accessible receives sponge baths by HHA. Pt has HHA 16hrs x 7 days with daughter suppling additional hours/ assistance

## 2023-05-13 NOTE — OCCUPATIONAL THERAPY INITIAL EVALUATION ADULT - PERTINENT HX OF CURRENT PROBLEM, REHAB EVAL
83-year-old female  PMH: fluid overload and hypertension    Presents with bilateral leg swelling.    Reports that symptoms started 2 weeks ago.    Patient was seen by her primary care doctor and increased her Lasix from 20 mg a day to 40 mg a day since 3 days ago.    Denies fever, shortness breath, chest pain, nausea, vomiting, abdominal pain, urinary symptoms, change in bowel movement

## 2023-05-13 NOTE — OCCUPATIONAL THERAPY INITIAL EVALUATION ADULT - RANGE OF MOTION EXAMINATION
R shoulder 0-75* AROM, PROM 0-90*, L shoulder ARM 0-75*, PROM wfl; bilateral distal ROM WFL/deficits as listed below

## 2023-05-13 NOTE — OCCUPATIONAL THERAPY INITIAL EVALUATION ADULT - GENERAL OBSERVATIONS, REHAB EVAL
Pt encountered semi reina in bed, + IV locked, + prima fit, + daughter present at bedside. Pt agreeable to bedside OT assessment, may be seen as confirmed with RN. Pt returned to bed as found, + IV locked, + prima fit, + bilateral Caire boots

## 2023-05-14 LAB
ANION GAP SERPL CALC-SCNC: 7 MMOL/L — SIGNIFICANT CHANGE UP (ref 7–14)
BUN SERPL-MCNC: 15 MG/DL — SIGNIFICANT CHANGE UP (ref 10–20)
CALCIUM SERPL-MCNC: 9.7 MG/DL — SIGNIFICANT CHANGE UP (ref 8.4–10.4)
CHLORIDE SERPL-SCNC: 101 MMOL/L — SIGNIFICANT CHANGE UP (ref 98–110)
CO2 SERPL-SCNC: 26 MMOL/L — SIGNIFICANT CHANGE UP (ref 17–32)
CREAT SERPL-MCNC: <0.5 MG/DL — LOW (ref 0.7–1.5)
EGFR: 98 ML/MIN/1.73M2 — SIGNIFICANT CHANGE UP
FERRITIN SERPL-MCNC: 59 NG/ML — SIGNIFICANT CHANGE UP (ref 15–150)
FOLATE SERPL-MCNC: 10.2 NG/ML — SIGNIFICANT CHANGE UP
GLUCOSE SERPL-MCNC: 92 MG/DL — SIGNIFICANT CHANGE UP (ref 70–99)
HCT VFR BLD CALC: 32.2 % — LOW (ref 37–47)
HGB BLD-MCNC: 10.8 G/DL — LOW (ref 12–16)
MCHC RBC-ENTMCNC: 30.1 PG — SIGNIFICANT CHANGE UP (ref 27–31)
MCHC RBC-ENTMCNC: 33.5 G/DL — SIGNIFICANT CHANGE UP (ref 32–37)
MCV RBC AUTO: 89.7 FL — SIGNIFICANT CHANGE UP (ref 81–99)
NRBC # BLD: 0 /100 WBCS — SIGNIFICANT CHANGE UP (ref 0–0)
PLATELET # BLD AUTO: 182 K/UL — SIGNIFICANT CHANGE UP (ref 130–400)
PMV BLD: 10.5 FL — HIGH (ref 7.4–10.4)
POTASSIUM SERPL-MCNC: 3.9 MMOL/L — SIGNIFICANT CHANGE UP (ref 3.5–5)
POTASSIUM SERPL-SCNC: 3.9 MMOL/L — SIGNIFICANT CHANGE UP (ref 3.5–5)
RBC # BLD: 3.59 M/UL — LOW (ref 4.2–5.4)
RBC # FLD: 13.8 % — SIGNIFICANT CHANGE UP (ref 11.5–14.5)
SODIUM SERPL-SCNC: 134 MMOL/L — LOW (ref 135–146)
VIT B12 SERPL-MCNC: 456 PG/ML — SIGNIFICANT CHANGE UP (ref 232–1245)
WBC # BLD: 5.02 K/UL — SIGNIFICANT CHANGE UP (ref 4.8–10.8)
WBC # FLD AUTO: 5.02 K/UL — SIGNIFICANT CHANGE UP (ref 4.8–10.8)

## 2023-05-14 PROCEDURE — 93306 TTE W/DOPPLER COMPLETE: CPT | Mod: 26

## 2023-05-14 PROCEDURE — 93970 EXTREMITY STUDY: CPT | Mod: 26

## 2023-05-14 PROCEDURE — 99232 SBSQ HOSP IP/OBS MODERATE 35: CPT

## 2023-05-14 RX ORDER — AMLODIPINE BESYLATE 2.5 MG/1
5 TABLET ORAL DAILY
Refills: 0 | Status: DISCONTINUED | OUTPATIENT
Start: 2023-05-14 | End: 2023-05-16

## 2023-05-14 RX ORDER — POLYETHYLENE GLYCOL 3350 17 G/17G
17 POWDER, FOR SOLUTION ORAL DAILY
Refills: 0 | Status: DISCONTINUED | OUTPATIENT
Start: 2023-05-14 | End: 2023-05-16

## 2023-05-14 RX ORDER — FUROSEMIDE 40 MG
40 TABLET ORAL
Refills: 0 | Status: DISCONTINUED | OUTPATIENT
Start: 2023-05-14 | End: 2023-05-16

## 2023-05-14 RX ADMIN — AMLODIPINE BESYLATE 5 MILLIGRAM(S): 2.5 TABLET ORAL at 17:36

## 2023-05-14 RX ADMIN — Medication 50 MILLIGRAM(S): at 05:26

## 2023-05-14 RX ADMIN — DULOXETINE HYDROCHLORIDE 20 MILLIGRAM(S): 30 CAPSULE, DELAYED RELEASE ORAL at 12:39

## 2023-05-14 RX ADMIN — POLYETHYLENE GLYCOL 3350 17 GRAM(S): 17 POWDER, FOR SOLUTION ORAL at 12:39

## 2023-05-14 RX ADMIN — Medication 40 MILLIGRAM(S): at 17:35

## 2023-05-14 RX ADMIN — ENOXAPARIN SODIUM 40 MILLIGRAM(S): 100 INJECTION SUBCUTANEOUS at 21:26

## 2023-05-14 RX ADMIN — Medication 15 MILLILITER(S): at 12:40

## 2023-05-14 RX ADMIN — Medication 40 MILLIGRAM(S): at 05:26

## 2023-05-14 RX ADMIN — Medication 81 MILLIGRAM(S): at 12:39

## 2023-05-14 NOTE — PHYSICAL THERAPY INITIAL EVALUATION ADULT - IMPAIRMENTS FOUND, PT EVAL
Pt is nonambulatory (PTA). Please monitor 1x/week until D/C/aerobic capacity/endurance/gait, locomotion, and balance/muscle strength/ROM

## 2023-05-14 NOTE — PROGRESS NOTE ADULT - SUBJECTIVE AND OBJECTIVE BOX
ABBE CROWDER  83y Female    Patient is a 83y old  Female who presents with a chief complaint of 83y    INTERVAL HPI/OVERNIGHT EVENTS:    ROS: Pt is seen and examined at bedside.    Overnight events: No acute events overnight.    Vital Signs Last 24 Hrs  T(C): 36.1 (14 May 2023 05:05), Max: 36.6 (13 May 2023 20:00)  T(F): 96.9 (14 May 2023 05:05), Max: 97.8 (13 May 2023 20:00)  HR: 60 (14 May 2023 05:05) (60 - 68)  BP: 165/72 (14 May 2023 05:05) (137/63 - 165/72)  BP(mean): 90 (13 May 2023 20:00) (90 - 90)  RR: 18 (14 May 2023 05:05) (18 - 18)  SpO2: --        gabapentin (Swelling (Moderate))      MEDICATIONS  (STANDING):  aspirin  chewable 81 milliGRAM(s) Oral daily  DULoxetine 20 milliGRAM(s) Oral daily  enoxaparin Injectable 40 milliGRAM(s) SubCutaneous every 24 hours  furosemide   Injectable 40 milliGRAM(s) IV Push daily  metoprolol succinate ER 50 milliGRAM(s) Oral daily  multivitamin/minerals/iron Oral Solution (CENTRUM) 15 milliLiter(s) Oral daily    MEDICATIONS  (PRN):  acetaminophen     Tablet .. 650 milliGRAM(s) Oral every 6 hours PRN Temp greater or equal to 38C (100.4F), Mild Pain (1 - 3)  aluminum hydroxide/magnesium hydroxide/simethicone Suspension 30 milliLiter(s) Oral every 4 hours PRN Dyspepsia  melatonin 3 milliGRAM(s) Oral at bedtime PRN Insomnia  ondansetron Injectable 4 milliGRAM(s) IV Push every 8 hours PRN Nausea and/or Vomiting      PHYSICAL EXAM:      LABS:                        10.8   5.02  )-----------( 182      ( 14 May 2023 05:40 )             32.2     05-14    134<L>  |  101  |  15  ----------------------------<  92  3.9   |  26  |  <0.5<L>    Ca    9.7      14 May 2023 05:40  Mg     1.9     05-13    TPro  6.6  /  Alb  4.3  /  TBili  0.5  /  DBili  x   /  AST  20  /  ALT  10  /  AlkPhos  81  05-12          RADIOLOGY & ADDITIONAL TESTS:               VEL ABBE  83y Female    Patient is a 83y old  Female who presents with a chief complaint of LE swelling    INTERVAL HPI/OVERNIGHT EVENTS:    ROS: Pt is seen and examined at bedside. Pt admits that LE swelling is improving and denies fever, chills, SOB, palpitations, nausea, vomiting, abdominal pain, dysuria, diarrhea, constipation, rash, muscle or joint pain.    Overnight events: No acute events overnight.    Vital Signs Last 24 Hrs  T(C): 36.1 (14 May 2023 05:05), Max: 36.6 (13 May 2023 20:00)  T(F): 96.9 (14 May 2023 05:05), Max: 97.8 (13 May 2023 20:00)  HR: 60 (14 May 2023 05:05) (60 - 68)  BP: 165/72 (14 May 2023 05:05) (137/63 - 165/72)  BP(mean): 90 (13 May 2023 20:00) (90 - 90)  RR: 18 (14 May 2023 05:05) (18 - 18)  SpO2: --    gabapentin (Swelling (Moderate))    MEDICATIONS  (STANDING):  aspirin  chewable 81 milliGRAM(s) Oral daily  DULoxetine 20 milliGRAM(s) Oral daily  enoxaparin Injectable 40 milliGRAM(s) SubCutaneous every 24 hours  furosemide   Injectable 40 milliGRAM(s) IV Push daily  metoprolol succinate ER 50 milliGRAM(s) Oral daily  multivitamin/minerals/iron Oral Solution (CENTRUM) 15 milliLiter(s) Oral daily    MEDICATIONS  (PRN):  acetaminophen     Tablet .. 650 milliGRAM(s) Oral every 6 hours PRN Temp greater or equal to 38C (100.4F), Mild Pain (1 - 3)  aluminum hydroxide/magnesium hydroxide/simethicone Suspension 30 milliLiter(s) Oral every 4 hours PRN Dyspepsia  melatonin 3 milliGRAM(s) Oral at bedtime PRN Insomnia  ondansetron Injectable 4 milliGRAM(s) IV Push every 8 hours PRN Nausea and/or Vomiting    PHYSICAL EXAM:  General Appearance: NAD, normal for age and gender. 	  Neck: Normal JVP, no bruit.   Eyes: Conjunctiva clear, Extra Ocular muscles intact. No scleral icterus.  ENMT: Moist oral mucosa. No oral lesion.  Cardiovascular: Regular rate and rhythm S1 S2, No JVD, No murmurs.  Respiratory: Bilateral air entry, decreased breath sounds at bases. No wheezes, rales or rhonchi.  Psychiatry: Alert and oriented x 3, Mood & affect appropriate.  Gastrointestinal:  Soft, Non-tender, Non-distended.  Neurologic: Non-focal deficits.  Musculoskeletal/ extremities: Normal range of motion, No clubbing, cyanosis. Bilateral LE swelling up to bilateral thigh and pelvic area.  Vascular: Peripheral pulses palpable bilaterally.  Skin/Integumen: No rashes, No ecchymoses, No cyanosis.      LABS:                        10.8   5.02  )-----------( 182      ( 14 May 2023 05:40 )             32.2     05-14    134<L>  |  101  |  15  ----------------------------<  92  3.9   |  26  |  <0.5<L>    Ca    9.7      14 May 2023 05:40  Mg     1.9     05-13    TPro  6.6  /  Alb  4.3  /  TBili  0.5  /  DBili  x   /  AST  20  /  ALT  10  /  AlkPhos  81  05-12      RADIOLOGY & ADDITIONAL TESTS:    < from: TTE Echo Complete w/o Contrast w/ Doppler (05.14.23 @ 11:39) >  Summary:   1. Technically difficult study.   2. Normal global left ventricular systolic function with a biplane EF of   60 %. Moderate (grade II) diastolic dysfunctionwith increased left   atrial pressure. Normal internal cavity size and wall thickness.   3. Normal right ventricular size and function.   4. Mildly enlarged left atrium.   5. Normal right atrial size.   6. Mitral annular calcification.   7. Scleroticaortic valve with normal opening.   8. No evidence of pulmonary hypertension.   9. No pericardial effusion.  10. No prior TTE is available for direct image comparison.

## 2023-05-14 NOTE — PHYSICAL THERAPY INITIAL EVALUATION ADULT - NSACTIVITYREC_GEN_A_PT
Patient requires complete assistance with functional mobility. PT recommends D/C to home with assistance  when medically appropriate. Refer to evaluation/treatment for details.

## 2023-05-14 NOTE — PHYSICAL THERAPY INITIAL EVALUATION ADULT - RANGE OF MOTION EXAMINATION, REHAB EVAL
BUE WFL except shdr to 85 degrees + painful  RLE no active ROM noted Left LE hip 1/4 range knee 1/4 range ankle to -10

## 2023-05-14 NOTE — PROGRESS NOTE ADULT - ASSESSMENT
83-year-old female  PMH: CAD, LE edema , HTN, MDD, post polio wheelchair bound   Presents for worsening LE edema     # UE/LE edema - acute on chronic   # HTN   # CAD  - follows with Cards, Dr. Aldana  - c/w  BB and ASA  - holding amlodipine   - compression stockings   - Lasix 40 IV daily   - f/u LE duplex   - f/u TTE   - f/u cardio consult     # MDD  - c/w Cymbalta    # Postpolio Syndrome/Gait Dysfunction  - WC bound  - c/w PT/OT   - Fall precautions.        # DVT : Lovenox  # Diet : DASH  # Activity : [place] - wheelchair bound at baseline - f/u PT   # Dispo : from home   # Code : Full    83-year-old female PMH of CAD, LE edema , HTN, MDD, post polio wheelchair bound presented for worsening bilateral LE edema     # Bilateral LE swelling likely due to HFpEF/diastolic exacerbation and dependent edema  # HTN, BP elevated  # CAD  - Echo with normal EF and no significant valvular dysfunction  - follows with Cards, Dr. Georges  - c/w BB and ASA  - will increase lasix to 40 mg BID, strict I+Os and daily weight  - f/u LE duplex   - f/u cardio consult   - LE unlikely due to amlodipine, will resume given elevated BP    # MDD  - c/w Cymbalta    # Postpolio Syndrome/Gait Dysfunction  - WC bound  - c/w PT/OT   - Fall precautions.    # DVT : Lovenox  # Diet : DASH  # Activity : [place] - wheelchair bound at baseline - f/u PT   # Dispo : from home   # Code : Full

## 2023-05-15 ENCOUNTER — TRANSCRIPTION ENCOUNTER (OUTPATIENT)
Age: 83
End: 2023-05-15

## 2023-05-15 LAB
ALBUMIN SERPL ELPH-MCNC: 3.9 G/DL — SIGNIFICANT CHANGE UP (ref 3.5–5.2)
ALP SERPL-CCNC: 68 U/L — SIGNIFICANT CHANGE UP (ref 30–115)
ALT FLD-CCNC: 11 U/L — SIGNIFICANT CHANGE UP (ref 0–41)
ANION GAP SERPL CALC-SCNC: 12 MMOL/L — SIGNIFICANT CHANGE UP (ref 7–14)
AST SERPL-CCNC: 20 U/L — SIGNIFICANT CHANGE UP (ref 0–41)
BILIRUB SERPL-MCNC: 0.4 MG/DL — SIGNIFICANT CHANGE UP (ref 0.2–1.2)
BUN SERPL-MCNC: 15 MG/DL — SIGNIFICANT CHANGE UP (ref 10–20)
CALCIUM SERPL-MCNC: 10.1 MG/DL — SIGNIFICANT CHANGE UP (ref 8.4–10.4)
CHLORIDE SERPL-SCNC: 101 MMOL/L — SIGNIFICANT CHANGE UP (ref 98–110)
CO2 SERPL-SCNC: 26 MMOL/L — SIGNIFICANT CHANGE UP (ref 17–32)
CORTIS AM PEAK SERPL-MCNC: 13.2 UG/DL — SIGNIFICANT CHANGE UP (ref 6–18.4)
CREAT SERPL-MCNC: <0.5 MG/DL — LOW (ref 0.7–1.5)
EGFR: 98 ML/MIN/1.73M2 — SIGNIFICANT CHANGE UP
GLUCOSE SERPL-MCNC: 99 MG/DL — SIGNIFICANT CHANGE UP (ref 70–99)
HCT VFR BLD CALC: 32.5 % — LOW (ref 37–47)
HGB BLD-MCNC: 10.9 G/DL — LOW (ref 12–16)
MAGNESIUM SERPL-MCNC: 1.9 MG/DL — SIGNIFICANT CHANGE UP (ref 1.8–2.4)
MCHC RBC-ENTMCNC: 29.9 PG — SIGNIFICANT CHANGE UP (ref 27–31)
MCHC RBC-ENTMCNC: 33.5 G/DL — SIGNIFICANT CHANGE UP (ref 32–37)
MCV RBC AUTO: 89 FL — SIGNIFICANT CHANGE UP (ref 81–99)
NRBC # BLD: 0 /100 WBCS — SIGNIFICANT CHANGE UP (ref 0–0)
PLATELET # BLD AUTO: 195 K/UL — SIGNIFICANT CHANGE UP (ref 130–400)
PMV BLD: 10 FL — SIGNIFICANT CHANGE UP (ref 7.4–10.4)
POTASSIUM SERPL-MCNC: 3.8 MMOL/L — SIGNIFICANT CHANGE UP (ref 3.5–5)
POTASSIUM SERPL-SCNC: 3.8 MMOL/L — SIGNIFICANT CHANGE UP (ref 3.5–5)
PROT SERPL-MCNC: 6.1 G/DL — SIGNIFICANT CHANGE UP (ref 6–8)
RBC # BLD: 3.65 M/UL — LOW (ref 4.2–5.4)
RBC # FLD: 13.6 % — SIGNIFICANT CHANGE UP (ref 11.5–14.5)
SODIUM SERPL-SCNC: 139 MMOL/L — SIGNIFICANT CHANGE UP (ref 135–146)
WBC # BLD: 5.15 K/UL — SIGNIFICANT CHANGE UP (ref 4.8–10.8)
WBC # FLD AUTO: 5.15 K/UL — SIGNIFICANT CHANGE UP (ref 4.8–10.8)

## 2023-05-15 PROCEDURE — 99232 SBSQ HOSP IP/OBS MODERATE 35: CPT

## 2023-05-15 RX ADMIN — DULOXETINE HYDROCHLORIDE 20 MILLIGRAM(S): 30 CAPSULE, DELAYED RELEASE ORAL at 12:35

## 2023-05-15 RX ADMIN — Medication 40 MILLIGRAM(S): at 05:30

## 2023-05-15 RX ADMIN — ENOXAPARIN SODIUM 40 MILLIGRAM(S): 100 INJECTION SUBCUTANEOUS at 21:30

## 2023-05-15 RX ADMIN — Medication 40 MILLIGRAM(S): at 14:06

## 2023-05-15 RX ADMIN — Medication 50 MILLIGRAM(S): at 05:30

## 2023-05-15 RX ADMIN — AMLODIPINE BESYLATE 5 MILLIGRAM(S): 2.5 TABLET ORAL at 05:30

## 2023-05-15 RX ADMIN — Medication 81 MILLIGRAM(S): at 12:35

## 2023-05-15 NOTE — DISCHARGE NOTE NURSING/CASE MANAGEMENT/SOCIAL WORK - PATIENT PORTAL LINK FT
You can access the FollowMyHealth Patient Portal offered by [facility] by registering at the following website: [email or web address]. By joining [email or web address]’s FollowMyHealth portal, you will also be able to view your health information using other applications (apps) compatible with our system.

## 2023-05-15 NOTE — PROGRESS NOTE ADULT - SUBJECTIVE AND OBJECTIVE BOX
VELABBE  83y  Female      Patient is a 83y old  Female who presents with a chief complaint of     INTERVAL HPI/OVERNIGHT EVENTS:  She feels ok, no new complaints.   Vital Signs Last 24 Hrs  T(C): 36.1 (15 May 2023 04:54), Max: 36.6 (14 May 2023 20:08)  T(F): 96.9 (15 May 2023 04:54), Max: 97.8 (14 May 2023 20:08)  HR: 69 (15 May 2023 04:54) (59 - 69)  BP: 129/62 (15 May 2023 04:54) (129/62 - 173/73)  BP(mean): --  RR: 18 (14 May 2023 20:08) (18 - 18)  SpO2: --          05-14-23 @ 07:01  -  05-15-23 @ 07:00  --------------------------------------------------------  IN: 600 mL / OUT: 2725 mL / NET: -2125 mL            Consultant(s) Notes Reviewed:  [x ] YES  [ ] NO          MEDICATIONS  (STANDING):  amLODIPine   Tablet 5 milliGRAM(s) Oral daily  aspirin  chewable 81 milliGRAM(s) Oral daily  DULoxetine 20 milliGRAM(s) Oral daily  enoxaparin Injectable 40 milliGRAM(s) SubCutaneous every 24 hours  furosemide   Injectable 40 milliGRAM(s) IV Push two times a day  metoprolol succinate ER 50 milliGRAM(s) Oral daily  multivitamin/minerals/iron Oral Solution (CENTRUM) 15 milliLiter(s) Oral daily  polyethylene glycol 3350 17 Gram(s) Oral daily    MEDICATIONS  (PRN):  acetaminophen     Tablet .. 650 milliGRAM(s) Oral every 6 hours PRN Temp greater or equal to 38C (100.4F), Mild Pain (1 - 3)  aluminum hydroxide/magnesium hydroxide/simethicone Suspension 30 milliLiter(s) Oral every 4 hours PRN Dyspepsia  melatonin 3 milliGRAM(s) Oral at bedtime PRN Insomnia  ondansetron Injectable 4 milliGRAM(s) IV Push every 8 hours PRN Nausea and/or Vomiting      LABS                          10.9   5.15  )-----------( 195      ( 15 May 2023 06:15 )             32.5     05-15    139  |  101  |  15  ----------------------------<  99  3.8   |  26  |  <0.5<L>    Ca    10.1      15 May 2023 06:15  Mg     1.9     05-15    TPro  6.1  /  Alb  3.9  /  TBili  0.4  /  DBili  x   /  AST  20  /  ALT  11  /  AlkPhos  68  05-15          Lactate Trend        CAPILLARY BLOOD GLUCOSE            RADIOLOGY & ADDITIONAL TESTS:    Imaging Personally Reviewed:  [ ] YES  [ ] NO    HEALTH ISSUES - PROBLEM Dx:          PHYSICAL EXAM:  GENERAL: NAD, well-developed.  HEAD:  Atraumatic, Normocephalic.  EYES: EOMI, PERRLA, conjunctiva and sclera clear.  NECK: Supple, No JVD.  CHEST/LUNG: Clear to auscultation bilaterally; No wheeze.  HEART: Regular rate and rhythm; S1 S2.   ABDOMEN: Soft, Nontender, Nondistended; Bowel sounds present.  EXTREMITIES:  2+ Peripheral Pulses, leg edema 1+  PSYCH: AAOx3.  NEUROLOGY: non-focal.  SKIN: No rashes or lesions.

## 2023-05-15 NOTE — DISCHARGE NOTE NURSING/CASE MANAGEMENT/SOCIAL WORK - NSDCPEFALRISK_GEN_ALL_CORE
For information on Fall & Injury Prevention, visit: [email or web address] OR  [email or web address] OR  https://www.cdc.gov/steadi/patient.html

## 2023-05-15 NOTE — PROGRESS NOTE ADULT - ASSESSMENT
83-year-old female PMH of CAD, LE edema , HTN, MDD, post polio wheelchair bound presented for worsening bilateral LE edema     A/P:   Lower leg edema likely from acute HFpEF and dependent edema  Pro-.   Echo with normal EF and no significant valvular dysfunction  CXR showed no acute infiltrates.   Venous duplex for LE negative for DVT.   Continue Lasix to 40 mg BID, switch to Lasix po tomorrow.   Low sodium diet.     HTN:   Continue Lasix, Amlodipine resumed    CAD: Stable  Continue ASA, Metoprolol   MDD  c/w Cymbalta    Postpolio Syndrome/Gait Dysfunction  c/w PT/OT   Fall precautions.    DVT : Lovenox  Code : Full   Discharge home in 24 hrs.   Family discussion: with her daughter, informed about discharge plan.

## 2023-05-16 ENCOUNTER — TRANSCRIPTION ENCOUNTER (OUTPATIENT)
Age: 83
End: 2023-05-16

## 2023-05-16 VITALS
WEIGHT: 282.19 LBS | HEART RATE: 62 BPM | RESPIRATION RATE: 18 BRPM | DIASTOLIC BLOOD PRESSURE: 60 MMHG | SYSTOLIC BLOOD PRESSURE: 131 MMHG | TEMPERATURE: 96 F

## 2023-05-16 PROCEDURE — 99239 HOSP IP/OBS DSCHRG MGMT >30: CPT

## 2023-05-16 RX ORDER — MULTIVIT-MIN/FERROUS GLUCONATE 9 MG/15 ML
1 LIQUID (ML) ORAL
Qty: 0 | Refills: 0 | DISCHARGE
Start: 2023-05-16

## 2023-05-16 RX ORDER — FUROSEMIDE 40 MG
1 TABLET ORAL
Refills: 0 | DISCHARGE

## 2023-05-16 RX ORDER — FUROSEMIDE 40 MG
1 TABLET ORAL
Qty: 30 | Refills: 0
Start: 2023-05-16 | End: 2023-06-14

## 2023-05-16 RX ORDER — FUROSEMIDE 40 MG
40 TABLET ORAL DAILY
Refills: 0 | Status: DISCONTINUED | OUTPATIENT
Start: 2023-05-17 | End: 2023-05-16

## 2023-05-16 RX ADMIN — AMLODIPINE BESYLATE 5 MILLIGRAM(S): 2.5 TABLET ORAL at 05:51

## 2023-05-16 RX ADMIN — Medication 40 MILLIGRAM(S): at 05:51

## 2023-05-16 RX ADMIN — DULOXETINE HYDROCHLORIDE 20 MILLIGRAM(S): 30 CAPSULE, DELAYED RELEASE ORAL at 11:53

## 2023-05-16 RX ADMIN — Medication 81 MILLIGRAM(S): at 11:53

## 2023-05-16 RX ADMIN — Medication 50 MILLIGRAM(S): at 05:51

## 2023-05-16 NOTE — DISCHARGE NOTE PROVIDER - NSDCMRMEDTOKEN_GEN_ALL_CORE_FT
aspirin 81 mg oral tablet, chewable: 1 tablet with sensor chewed once a day  DULoxetine 20 mg oral delayed release capsule: 1 tab(s) orally once a day  Lasix 20 mg oral tablet: 1 orally once a day  methenamine hippurate 1 g oral tablet: 1 tab(s) orally 2 times a day  metoprolol succinate 50 mg oral capsule, extended release: 1 tab(s) orally once a day  Multiple Vitamins with Minerals oral liquid: orally  Norvasc 5 mg oral tablet: 1 tab(s) orally once a day   aspirin 81 mg oral tablet, chewable: 1 tablet with sensor chewed once a day  DULoxetine 20 mg oral delayed release capsule: 1 tab(s) orally once a day  furosemide 40 mg oral tablet: 1 tab(s) orally once a day  methenamine hippurate 1 g oral tablet: 1 tab(s) orally 2 times a day  metoprolol succinate 50 mg oral capsule, extended release: 1 tab(s) orally once a day  Multiple Vitamins with Minerals oral liquid: 1 tab(s) orally once a day  Norvasc 5 mg oral tablet: 1 tab(s) orally once a day

## 2023-05-16 NOTE — DISCHARGE NOTE PROVIDER - NSDCFUADDAPPT_GEN_ALL_CORE_FT
APPTS ARE READY TO BE MADE: [ x ] YES    Best Family or Patient Contact (if needed): Daughter [phone]    Additional Information about above appointments (if needed):    1: Needs cardiology follow-up  2:   3:     Other comments or requests:    APPTS ARE READY TO BE MADE: [ x ] YES    Best Family or Patient Contact (if needed): Daughter [phone]    Additional Information about above appointments (if needed):    1: Needs cardiology follow-up with Dr Hernandez  2:   3:     Other comments or requests:    APPTS ARE READY TO BE MADE: [ x ] YES    Best Family or Patient Contact (if needed): Daughter [phone]    Additional Information about above appointments (if needed):    1: Needs cardiology follow-up with Dr Hernandez  2:   3:     Other comments or requests:     Patient was provided with follow up request details and was advised to call to schedule follow up within specified time frame.

## 2023-05-16 NOTE — DISCHARGE NOTE PROVIDER - ATTENDING DISCHARGE PHYSICAL EXAMINATION:
PHYSICAL EXAM:  GENERAL: NAD, well-developed.  HEAD:  Atraumatic, Normocephalic.  EYES: EOMI, PERRLA, conjunctiva and sclera clear.  NECK: Supple, No JVD.  CHEST/LUNG: Clear to auscultation bilaterally; No wheeze.  HEART: Regular rate and rhythm; S1 S2.   ABDOMEN: Soft, Nontender, Nondistended; Bowel sounds present.  EXTREMITIES:  2+ Peripheral Pulses, improving leg edema 1+  PSYCH: AAOx3.  NEUROLOGY: non-focal.  SKIN: No rashes or lesions.

## 2023-05-16 NOTE — DISCHARGE NOTE PROVIDER - PROVIDER TOKENS
PROVIDER:[TOKEN:[[id number]],FOLLOWUP:[2 weeks]] PROVIDER:[TOKEN:[[id number]],FOLLOWUP:[2 weeks]],PROVIDER:[TOKEN:[[id number]],FOLLOWUP:[1 week]]

## 2023-05-16 NOTE — DISCHARGE NOTE PROVIDER - NSDCCPCAREPLAN_GEN_ALL_CORE_FT
PRINCIPAL DISCHARGE DIAGNOSIS  Diagnosis: Fluid overload  Assessment and Plan of Treatment: You admitted to hospital with fluid buildup likely from your underlying heatr failure. We treated you with IV lasix. Please continue to take PO lasix at home and work with physical therapy and get better

## 2023-05-16 NOTE — DISCHARGE NOTE PROVIDER - CARE PROVIDERS DIRECT ADDRESSES
[email or web address],DirectAddress_Unknown

## 2023-05-16 NOTE — DISCHARGE NOTE PROVIDER - HOSPITAL COURSE
83-year-old female PMH of CAD, LE edema , HTN, MDD, post polio wheelchair bound presented for worsening bilateral LE edema     HOSPITAL COURSE:   Lower leg edema likely from acute HFpEF and dependent edema  Pro-.   Echo with normal EF and no significant valvular dysfunction  CXR showed no acute infiltrates.   Venous duplex for LE negative for DVT.   Was on Lasix to 40 mg IV BID, switched to Lasix po on discharge  Low sodium diet    HTN:   Continue Lasix, Amlodipine     CAD: Stable  Continue ASA, Metoprolol     MDD  c/w Cymbalta    Postpolio Syndrome/Gait Dysfunction  c/w PT/OT

## 2023-05-16 NOTE — DISCHARGE NOTE PROVIDER - CARE PROVIDER_API CALL
Natacha Mederos)  Geriatric Medicine; Internal Medicine  [address] [place] [address]  Phone: [phone]  Fax: [phone]  Follow Up Time: 2 weeks   Natacha Mederos)  Geriatric Medicine; Internal Medicine  [address] [place] [address]  Phone: [phone]  Fax: [phone]  Follow Up Time: 2 weeks    Dwight Wolf  CARDIOVASCULAR DISEASE  [address] [address]  Phone: [phone]  Fax: [phone]  Follow Up Time: 1 week

## 2023-05-17 ENCOUNTER — TRANSCRIPTION ENCOUNTER (OUTPATIENT)
Age: 83
End: 2023-05-17

## 2023-05-19 ENCOUNTER — TRANSCRIPTION ENCOUNTER (OUTPATIENT)
Age: 83
End: 2023-05-19

## 2023-05-19 DIAGNOSIS — Z99.3 DEPENDENCE ON WHEELCHAIR: ICD-10-CM

## 2023-05-19 DIAGNOSIS — G14 POSTPOLIO SYNDROME: ICD-10-CM

## 2023-05-19 DIAGNOSIS — I50.33 ACUTE ON CHRONIC DIASTOLIC (CONGESTIVE) HEART FAILURE: ICD-10-CM

## 2023-05-19 DIAGNOSIS — I11.0 HYPERTENSIVE HEART DISEASE WITH HEART FAILURE: ICD-10-CM

## 2023-05-19 DIAGNOSIS — F32.9 MAJOR DEPRESSIVE DISORDER, SINGLE EPISODE, UNSPECIFIED: ICD-10-CM

## 2023-05-26 RX ORDER — FUROSEMIDE 40 MG/1
40 TABLET ORAL
Refills: 0 | Status: ACTIVE | COMMUNITY

## 2023-05-26 RX ORDER — SULFAMETHOXAZOLE AND TRIMETHOPRIM 800; 160 MG/1; MG/1
800-160 TABLET ORAL TWICE DAILY
Qty: 10 | Refills: 0 | Status: DISCONTINUED | COMMUNITY
Start: 2022-07-28 | End: 2023-05-26

## 2023-05-26 RX ORDER — SILVER SULFADIAZINE 10 MG/G
1 CREAM TOPICAL TWICE DAILY
Qty: 1 | Refills: 1 | Status: DISCONTINUED | COMMUNITY
Start: 2022-05-23 | End: 2023-05-26

## 2023-05-29 ENCOUNTER — APPOINTMENT (OUTPATIENT)
Dept: GERIATRICS | Facility: HOME HEALTH | Age: 83
End: 2023-05-29
Payer: MEDICARE

## 2023-05-29 VITALS
HEART RATE: 58 BPM | DIASTOLIC BLOOD PRESSURE: 64 MMHG | OXYGEN SATURATION: 100 % | RESPIRATION RATE: 18 BRPM | SYSTOLIC BLOOD PRESSURE: 122 MMHG | TEMPERATURE: 98.2 F

## 2023-05-29 PROCEDURE — 99495 TRANSJ CARE MGMT MOD F2F 14D: CPT

## 2023-05-29 NOTE — ASSESSMENT
[FreeTextEntry1] : #HFpEF (G2DD) s/p recent hospitalization 5/23\par - at baseline\par - weights QD\par - c/w Furosemide 40mg PO QD\par - BLE elevation\par - outpatient Cards FU\par - ECHO\par      Summary:\par  1. Technically difficult study.\par  2. Normal global left ventricular systolic function with a biplane EF of \par 60 %. Moderate (grade II) diastolic dysfunction with increased left \par atrial pressure. Normal internal cavity size and wall thickness.\par  3. Normal right ventricular size and function.\par  4. Mildly enlarged left atrium.\par  5. Normal right atrial size.\par  6. Mitral annular calcification.\par  7. Sclerotic aortic valve with normal opening.\par  8. No evidence of pulmonary hypertension.\par  9. No pericardial effusion.\par 10. No prior TTE is available for direct image comparison\par \par #HTN, ?CAD\par - follows with [facility], Dr. Aldana\par - unknown Hx CAD but on BB and ASA, no statin, no previous hx PCI\par - c/w BB/ASA\par - c/w CCB for BP control\par \par #MDD\par - c/w Cymbalta\par - outpatient Psych FU as scheduled\par \par #Postpolio Syndrome/Gait Dysfunction\par - WC bound\par - c/w PT/OT \par - Fall precautions.\par \par \par

## 2023-05-29 NOTE — HISTORY OF PRESENT ILLNESS
[FreeTextEntry1] : Patient seen and examined at home.  Patient is homebound 2/2 generalized weakness/debility.  Dtr and HHA present during visit.  Patient s/p recent hospitalization 5/12-5/16 2/2 worsening BLE edema thought to be 2/2 HFpEF.  Patient diuresed with IV Lasix and changed to PO on discharge.  Reports spoke w/Cards, don't think it was CHF, think its been salt intake.  Also decreased Metoprolol to 25mg QD and started on Lasix 40mg QD and 20mg QPM.  Doing well since discharge.  No acute complaints.  No CP/SOB. No abdominal complaints with good appetite and regular BM's.  No urinary complaints.  No recent falls.  No skin wounds.

## 2023-05-29 NOTE — PHYSICAL EXAM
[Alert] : alert [Normal Outer Ear/Nose] : the ears and nose were normal in appearance [Normal Appearance] : the appearance of the neck was normal [Supple] : the neck was supple [No Respiratory Distress] : no respiratory distress [No Acc Muscle Use] : no accessory muscle use [Respiration, Rhythm And Depth] : normal respiratory rhythm and effort [Auscultation Breath Sounds / Voice Sounds] : lungs were clear to auscultation bilaterally [Normal S1, S2] : normal S1 and S2 [Heart Rate And Rhythm] : heart rate was normal and rhythm regular [Abdomen Tenderness] : non-tender [Abdomen Soft] : soft [No Spinal Tenderness] : no spinal tenderness [No Clubbing, Cyanosis] : no clubbing or cyanosis of the fingernails [Involuntary Movements] : no involuntary movements were seen [] : no rash [Skin Lesions] : no skin lesions [No Focal Deficits] : no focal deficits [Oriented To Time, Place, And Person] : oriented to person, place, and time [de-identified] : 1+ BLE pitting edema [de-identified] : +BLE weakness

## 2023-05-30 ENCOUNTER — TRANSCRIPTION ENCOUNTER (OUTPATIENT)
Age: 83
End: 2023-05-30

## 2023-06-03 ENCOUNTER — APPOINTMENT (OUTPATIENT)
Dept: GERIATRICS | Facility: HOME HEALTH | Age: 83
End: 2023-06-03
Payer: MEDICARE

## 2023-06-03 DIAGNOSIS — M25.551 PAIN IN RIGHT HIP: ICD-10-CM

## 2023-06-03 PROCEDURE — ZZZZZ: CPT

## 2023-06-06 ENCOUNTER — TRANSCRIPTION ENCOUNTER (OUTPATIENT)
Age: 83
End: 2023-06-06

## 2023-06-09 ENCOUNTER — APPOINTMENT (OUTPATIENT)
Dept: GERIATRICS | Facility: HOME HEALTH | Age: 83
End: 2023-06-09
Payer: MEDICARE

## 2023-06-09 VITALS
OXYGEN SATURATION: 100 % | RESPIRATION RATE: 16 BRPM | SYSTOLIC BLOOD PRESSURE: 120 MMHG | HEART RATE: 68 BPM | TEMPERATURE: 97.2 F | DIASTOLIC BLOOD PRESSURE: 62 MMHG
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
## 2023-06-09 PROCEDURE — 99348 HOME/RES VST EST LOW MDM 30: CPT

## 2023-06-09 NOTE — PHYSICAL EXAM
[Alert] : alert [Normal Outer Ear/Nose] : the ears and nose were normal in appearance [Normal Appearance] : the appearance of the neck was normal [Supple] : the neck was supple [No Respiratory Distress] : no respiratory distress [No Acc Muscle Use] : no accessory muscle use [Respiration, Rhythm And Depth] : normal respiratory rhythm and effort [Auscultation Breath Sounds / Voice Sounds] : lungs were clear to auscultation bilaterally [Normal S1, S2] : normal S1 and S2 [Heart Rate And Rhythm] : heart rate was normal and rhythm regular [Abdomen Tenderness] : non-tender [Abdomen Soft] : soft [No Spinal Tenderness] : no spinal tenderness [No Clubbing, Cyanosis] : no clubbing or cyanosis of the fingernails [Involuntary Movements] : no involuntary movements were seen [] : no rash [Skin Lesions] : no skin lesions [No Focal Deficits] : no focal deficits [Oriented To Time, Place, And Person] : oriented to person, place, and time [de-identified] : 1+ BLE pitting edema [de-identified] : +BLE weakness; + painful ROM left knee with edema [de-identified] : Multiple areas of ecchymosis under b/l arms and under b/l breasts as well as to anterior right knee extending distally to anterior tib/fib.  No open owunds.

## 2023-06-09 NOTE — HISTORY OF PRESENT ILLNESS
[FreeTextEntry1] : Patient seen and examined at home.  Patient is homebound 2/2 generalized weakness/debility.  HHA and Dtr present during visit.  Reports patient leg slipped out while in the sitting to standing machine and she was dangling in machine x 15m.  Patient was eventually lowered to floor, unable to get back up.  Called EMS and placed patient in chair, did not take to hospital.  Patient with complaints of continued knee pain, left.  Also reporting bruising to chest and breasts.  No pain to hip.  Patient reports had X-rays done and all negative.  Unable to tell me what was filmed and no films in chart.  Patient reports pain is improving from onset.  Using APAP and Celebrex, no longer using ICE.

## 2023-06-09 NOTE — ASSESSMENT
[FreeTextEntry1] : #s/p mechanical fall with left knee pain/effusion\par - X-rays negative, need to see what was filmed\par - APAP PRN\par - ICE TID 15-20m\par - monitor for worsening

## 2023-06-28 ENCOUNTER — INPATIENT (INPATIENT)
Facility: HOSPITAL | Age: 83
LOS: 2 days | Discharge: NURSING, REHAB OR LONG-TERM CARE | DRG: 543 | End: 2023-07-01
Attending: INTERNAL MEDICINE | Admitting: INTERNAL MEDICINE
Payer: MEDICARE

## 2023-06-28 VITALS
HEART RATE: 69 BPM | RESPIRATION RATE: 18 BRPM | TEMPERATURE: 98 F | SYSTOLIC BLOOD PRESSURE: 146 MMHG | WEIGHT: 293 LBS | HEIGHT: 71 IN | OXYGEN SATURATION: 99 % | DIASTOLIC BLOOD PRESSURE: 68 MMHG

## 2023-06-28 DIAGNOSIS — I10 ESSENTIAL (PRIMARY) HYPERTENSION: ICD-10-CM

## 2023-06-28 DIAGNOSIS — G14 POSTPOLIO SYNDROME: ICD-10-CM

## 2023-06-28 DIAGNOSIS — F32.9 MAJOR DEPRESSIVE DISORDER, SINGLE EPISODE, UNSPECIFIED: ICD-10-CM

## 2023-06-28 DIAGNOSIS — Z63.8 OTHER SPECIFIED PROBLEMS RELATED TO PRIMARY SUPPORT GROUP: ICD-10-CM

## 2023-06-28 DIAGNOSIS — Z88.0 ALLERGY STATUS TO PENICILLIN: ICD-10-CM

## 2023-06-28 DIAGNOSIS — Z74.01 BED CONFINEMENT STATUS: ICD-10-CM
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
## 2023-06-28 DIAGNOSIS — D64.9 ANEMIA, UNSPECIFIED: ICD-10-CM

## 2023-06-28 DIAGNOSIS — S82.832A OTHER FRACTURE OF UPPER AND LOWER END OF LEFT FIBULA, INITIAL ENCOUNTER FOR CLOSED FRACTURE: ICD-10-CM

## 2023-06-28 DIAGNOSIS — S82.142A DISPLACED BICONDYLAR FRACTURE OF LEFT TIBIA, INITIAL ENCOUNTER FOR CLOSED FRACTURE: ICD-10-CM

## 2023-06-28 DIAGNOSIS — I25.10 ATHEROSCLEROTIC HEART DISEASE OF NATIVE CORONARY ARTERY WITHOUT ANGINA PECTORIS: ICD-10-CM
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
## 2023-06-28 DIAGNOSIS — S82.209A UNSPECIFIED FRACTURE OF SHAFT OF UNSPECIFIED TIBIA, INITIAL ENCOUNTER FOR CLOSED FRACTURE: ICD-10-CM

## 2023-06-28 DIAGNOSIS — M80.862A: ICD-10-CM

## 2023-06-28 DIAGNOSIS — E66.01 MORBID (SEVERE) OBESITY DUE TO EXCESS CALORIES: ICD-10-CM

## 2023-06-28 DIAGNOSIS — Z88.8 ALLERGY STATUS TO OTHER DRUGS, MEDICAMENTS AND BIOLOGICAL SUBSTANCES: ICD-10-CM

## 2023-06-28 DIAGNOSIS — Z79.82 LONG TERM (CURRENT) USE OF ASPIRIN: ICD-10-CM

## 2023-06-28 LAB
ALBUMIN SERPL ELPH-MCNC: 4 G/DL — SIGNIFICANT CHANGE UP (ref 3.5–5.2)
ALP SERPL-CCNC: 164 U/L — HIGH (ref 30–115)
ALT FLD-CCNC: 12 U/L — SIGNIFICANT CHANGE UP (ref 0–41)
ANION GAP SERPL CALC-SCNC: 11 MMOL/L — SIGNIFICANT CHANGE UP (ref 7–14)
AST SERPL-CCNC: 22 U/L — SIGNIFICANT CHANGE UP (ref 0–41)
BASOPHILS # BLD AUTO: 0.05 K/UL — SIGNIFICANT CHANGE UP (ref 0–0.2)
BASOPHILS NFR BLD AUTO: 0.9 % — SIGNIFICANT CHANGE UP (ref 0–1)
BILIRUB SERPL-MCNC: 0.4 MG/DL — SIGNIFICANT CHANGE UP (ref 0.2–1.2)
BUN SERPL-MCNC: 20 MG/DL — SIGNIFICANT CHANGE UP (ref 10–20)
CALCIUM SERPL-MCNC: 10.1 MG/DL — SIGNIFICANT CHANGE UP (ref 8.4–10.4)
CHLORIDE SERPL-SCNC: 98 MMOL/L — SIGNIFICANT CHANGE UP (ref 98–110)
CO2 SERPL-SCNC: 24 MMOL/L — SIGNIFICANT CHANGE UP (ref 17–32)
CREAT SERPL-MCNC: 0.5 MG/DL — LOW (ref 0.7–1.5)
EGFR: 92 ML/MIN/1.73M2 — SIGNIFICANT CHANGE UP
EOSINOPHIL # BLD AUTO: 0.47 K/UL — SIGNIFICANT CHANGE UP (ref 0–0.7)
EOSINOPHIL NFR BLD AUTO: 8.6 % — HIGH (ref 0–8)
GLUCOSE SERPL-MCNC: 112 MG/DL — HIGH (ref 70–99)
HCT VFR BLD CALC: 30 % — LOW (ref 37–47)
HGB BLD-MCNC: 9.8 G/DL — LOW (ref 12–16)
IMM GRANULOCYTES NFR BLD AUTO: 0.2 % — SIGNIFICANT CHANGE UP (ref 0.1–0.3)
LYMPHOCYTES # BLD AUTO: 1.36 K/UL — SIGNIFICANT CHANGE UP (ref 1.2–3.4)
LYMPHOCYTES # BLD AUTO: 24.8 % — SIGNIFICANT CHANGE UP (ref 20.5–51.1)
MCHC RBC-ENTMCNC: 29.6 PG — SIGNIFICANT CHANGE UP (ref 27–31)
MCHC RBC-ENTMCNC: 32.7 G/DL — SIGNIFICANT CHANGE UP (ref 32–37)
MCV RBC AUTO: 90.6 FL — SIGNIFICANT CHANGE UP (ref 81–99)
MONOCYTES # BLD AUTO: 0.64 K/UL — HIGH (ref 0.1–0.6)
MONOCYTES NFR BLD AUTO: 11.7 % — HIGH (ref 1.7–9.3)
NEUTROPHILS # BLD AUTO: 2.96 K/UL — SIGNIFICANT CHANGE UP (ref 1.4–6.5)
NEUTROPHILS NFR BLD AUTO: 53.8 % — SIGNIFICANT CHANGE UP (ref 42.2–75.2)
NRBC # BLD: 0 /100 WBCS — SIGNIFICANT CHANGE UP (ref 0–0)
PLATELET # BLD AUTO: 287 K/UL — SIGNIFICANT CHANGE UP (ref 130–400)
PMV BLD: 9.3 FL — SIGNIFICANT CHANGE UP (ref 7.4–10.4)
POTASSIUM SERPL-MCNC: 4.3 MMOL/L — SIGNIFICANT CHANGE UP (ref 3.5–5)
POTASSIUM SERPL-SCNC: 4.3 MMOL/L — SIGNIFICANT CHANGE UP (ref 3.5–5)
PROT SERPL-MCNC: 6.2 G/DL — SIGNIFICANT CHANGE UP (ref 6–8)
RBC # BLD: 3.31 M/UL — LOW (ref 4.2–5.4)
RBC # FLD: 14 % — SIGNIFICANT CHANGE UP (ref 11.5–14.5)
SODIUM SERPL-SCNC: 133 MMOL/L — LOW (ref 135–146)
WBC # BLD: 5.49 K/UL — SIGNIFICANT CHANGE UP (ref 4.8–10.8)
WBC # FLD AUTO: 5.49 K/UL — SIGNIFICANT CHANGE UP (ref 4.8–10.8)

## 2023-06-28 PROCEDURE — 73700 CT LOWER EXTREMITY W/O DYE: CPT | Mod: LT

## 2023-06-28 PROCEDURE — 73564 X-RAY EXAM KNEE 4 OR MORE: CPT | Mod: 26,LT

## 2023-06-28 PROCEDURE — 73700 CT LOWER EXTREMITY W/O DYE: CPT | Mod: 26,LT

## 2023-06-28 PROCEDURE — 99222 1ST HOSP IP/OBS MODERATE 55: CPT | Mod: GC

## 2023-06-28 PROCEDURE — 97166 OT EVAL MOD COMPLEX 45 MIN: CPT | Mod: GO

## 2023-06-28 PROCEDURE — 99285 EMERGENCY DEPT VISIT HI MDM: CPT | Mod: FS

## 2023-06-28 RX ORDER — AMLODIPINE BESYLATE 2.5 MG/1
5 TABLET ORAL DAILY
Refills: 0 | Status: DISCONTINUED | OUTPATIENT
Start: 2023-06-28 | End: 2023-07-01

## 2023-06-28 RX ORDER — DULOXETINE HYDROCHLORIDE 30 MG/1
20 CAPSULE, DELAYED RELEASE ORAL DAILY
Refills: 0 | Status: DISCONTINUED | OUTPATIENT
Start: 2023-06-28 | End: 2023-07-01

## 2023-06-28 RX ORDER — ASPIRIN/CALCIUM CARB/MAGNESIUM 324 MG
81 TABLET ORAL DAILY
Refills: 0 | Status: DISCONTINUED | OUTPATIENT
Start: 2023-06-28 | End: 2023-07-01

## 2023-06-28 RX ORDER — FUROSEMIDE 40 MG
40 TABLET ORAL DAILY
Refills: 0 | Status: DISCONTINUED | OUTPATIENT
Start: 2023-06-28 | End: 2023-07-01

## 2023-06-28 RX ORDER — FUROSEMIDE 40 MG
20 TABLET ORAL AT BEDTIME
Refills: 0 | Status: DISCONTINUED | OUTPATIENT
Start: 2023-06-28 | End: 2023-06-29

## 2023-06-28 RX ORDER — ENOXAPARIN SODIUM 100 MG/ML
40 INJECTION SUBCUTANEOUS EVERY 24 HOURS
Refills: 0 | Status: DISCONTINUED | OUTPATIENT
Start: 2023-06-28 | End: 2023-07-01

## 2023-06-28 RX ORDER — METOPROLOL TARTRATE 50 MG
1 TABLET ORAL
Refills: 0 | DISCHARGE

## 2023-06-28 RX ORDER — METOPROLOL TARTRATE 50 MG
25 TABLET ORAL DAILY
Refills: 0 | Status: DISCONTINUED | OUTPATIENT
Start: 2023-06-28 | End: 2023-07-01

## 2023-06-28 SDOH — SOCIAL STABILITY - SOCIAL INSECURITY: OTHER SPECIFIED PROBLEMS RELATED TO PRIMARY SUPPORT GROUP: Z63.8

## 2023-06-28 NOTE — H&P ADULT - ATTENDING COMMENTS
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 Pt had mechanical fall onto L knee on 6/2 since has mild aching pain localized to knee. Pt does not remember how she fell. I called daughter who said that pt is bedbound at baseline due to post-polio syndrome. They were repositioning her at home and she slipped on the ground. She complained of left knee pain after the fall, portable xrays were done at home which were normal. Was seen by PMD who told her to come to ED to be placed in Rehab. Denies ac, head trauma, cp, sob, weakness, numbness, dysuria, hematuria, n/v/d/c.     In ED vitals stable   Labs significant for: Hb 9.8, Na 133,    Xray or left knee showed tibial plateau fracture   Evaluated by orthopedics in ED. Knee immobilizer placed  (28 Jun 2023 18:42)    REVIEW OF SYSTEMS: see cc/HPI   CONSTITUTIONAL: No weakness, fevers or chills  EYES/ENT: No visual changes;  No vertigo or throat pain   NECK: No pain or stiffness  RESPIRATORY: No cough, wheezing, hemoptysis; No shortness of breath  CARDIOVASCULAR: No chest pain or palpitations  GASTROINTESTINAL: No abdominal or epigastric pain. No nausea, vomiting, or hematemesis; No diarrhea or constipation. No melena or hematochezia.  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 Pt had mechanical fall onto L knee on 6/2 since has mild aching pain localized to knee. Pt does not remember how she fell. I called daughter who said that pt is bedbound at baseline due to post-polio syndrome. They were repositioning her at home and she slipped on the ground. She complained of left knee pain after the fall, portable xrays were done at home which were normal. Was seen by PMD who told her to come to ED to be placed in Rehab. Denies ac, head trauma, cp, sob, weakness, numbness, dysuria, hematuria, n/v/d/c.     In ED vitals stable   Labs significant for: Hb 9.8, Na 133,    Xray or left knee showed tibial plateau fracture   Evaluated by orthopedics in ED. Knee immobilizer placed  (28 Jun 2023 18:42)    REVIEW OF SYSTEMS: see cc/HPI   CONSTITUTIONAL: No weakness, fevers or chills  EYES/ENT: No visual changes;  No vertigo or throat pain   NECK: No pain or stiffness  RESPIRATORY: No cough, wheezing, hemoptysis; No shortness of breath  CARDIOVASCULAR: No chest pain or palpitations  GASTROINTESTINAL: No abdominal or epigastric pain. No nausea, vomiting, or hematemesis; No diarrhea or constipation. No melena or hematochezia.  GENITOURINARY: No dysuria, frequency or hematuria  NEUROLOGICAL: No numbness or weakness  SKIN: No itching, rashes  MSK - (+) deformity, (+) knee fracture     Physical Exam:   General: WN/WD NAD  Neurology: A&Ox3, nonfocal, follows commands  Eyes: PERRLA/ EOMI  ENT/Neck: Neck supple, trachea midline, No JVD  Respiratory: CTA B/L, No wheezing, rales, rhonchi  CV: Normal rate regular rhythm, S1S2, no murmurs, rubs or gallops  Abdominal: Soft, NT, ND +BS,   Extremities: LLE - knee immobilizer in place,   bilateral distal LE pedal edema, + peripheral pulses  Skin: No Rashes, Hematoma, Ecchymosis    A/p  L tibial plateau fracture   - keep immobilizer   - Ortho follow up   - PRN pain Rx  -NWB on LLE   -Fall precautions  -may need NH/LTC vs. CYNTHIA    H/O CAD   H/o HTN   H/o LE edema - possibly related to post-polio syndrome  -c/w OP Rx and monitor for electrolyte changes     MDD   -c/w Cymbalta     H/o Chronic anemia - stable   -serial CBC     DVT prophylaxis     PATIENT SEEN by ATTENDING 6/28/23

## 2023-06-28 NOTE — ED ADULT NURSE NOTE - OBJECTIVE STATEMENT
83 year old female presents to ED for "needing inpatient rehab" as per daughter. Pt's daughter states the patient "fell on june 2nd and hurt her L knee." Pt's daughter states the patient is unable to ambulate and needs physical therapy. pt denies pain at this time.

## 2023-06-28 NOTE — ED PROVIDER NOTE - CLINICAL SUMMARY MEDICAL DECISION MAKING FREE TEXT BOX
X-ray Lt knee showed Fx, no dislocation, +DJD.   Laboratory results showed anemia, and electrolytes are in acceptable range.   Patient remained hemodynamically stable during the course of ED stay. Discussed with patient about the results of the diagnostic studies. Discussed with admitting physician and MAR, patient is admitted to Medicine for further evaluation and care.  Patient was evaluated by ortho on call and treated by them.

## 2023-06-28 NOTE — ED ADULT NURSE NOTE - NSFALLHARMRISKINTERV_ED_ALL_ED
Rx faxed to pharmacy pending upcoming appointment.    Assistance OOB with selected safe patient handling equipment if applicable/Assistance with ambulation/Communicate risk of Fall with Harm to all staff, patient, and family/Monitor gait and stability/Provide visual cue: red socks, yellow wristband, yellow gown, etc/Reinforce activity limits and safety measures with patient and family/Bed in lowest position, wheels locked, appropriate side rails in place/Call bell, personal items and telephone in reach/Instruct patient to call for assistance before getting out of bed/chair/stretcher/Non-slip footwear applied when patient is off stretcher/[place] to call system/Physically safe environment - no spills, clutter or unnecessary equipment/Purposeful Proactive Rounding/Room/bathroom lighting operational, light cord in reach

## 2023-06-28 NOTE — ED PROVIDER NOTE - ATTENDING APP SHARED VISIT CONTRIBUTION OF CARE
Patient is c/o Lt knee x one month. Patient had an accidental trauma to LT knee one month ago. Patient had x-ray done at home and had been receiving physical therapy at home. Patient symptoms continued, so was sent to ED for admission for further care.   Vitals reviewed.   Lungs: CTA, no wheezing, no crackles.  LLE exam: Skin is intact, +swelling of the LT knee noted, stable knee joint, LLE is distally NVI.   A/P: LT knee pain/swelling,   x-rays, reevaluation.

## 2023-06-28 NOTE — H&P ADULT - ASSESSMENT
83y F pmh CAD, HTN, MDD, LL Edema, postpolio syndrome ( bedbound since 5 years ) presents for left knee pain. Pt had mechanical fall onto L knee on 6/2 since has mild aching pain localized to knee.    #Left tibial plateau fracture   - seen by ortho in ED   - Knee immobilizer placed   - Left knee CT scan  - NWB LLE  - pain control  - DVT ppx    #CAD  - c/w aspirin and metoprolol     #HTN  - c/w norvasc     #MDD   - c/w cymbalta 20 mg     #Bilateral LL edema   - pt admitted in may for   - Pro-.   - Echo with normal EF and no significant valvular dysfunction  - Venous duplex for LE negative for DVT.   - was discharged on lasix 40 mg OD   - currently on lasix 40 mg AM and 20 mg PM     #Post polio syndrome   - bedbound at [facility]     #Normocytic anemia   - anemia workup done in may 2023 normal   - monitor CBC     #DVT ppx: lovenox 40 mg subq  #GI ppx: not indicated   #Diet: DASH   #Dispo: acute

## 2023-06-28 NOTE — H&P ADULT - NSHPPHYSICALEXAM_GEN_ALL_CORE
LOS:     VITALS:   T(C): 36.7 (06-28-23 @ 14:43), Max: 36.7 (06-28-23 @ 14:43)  HR: 69 (06-28-23 @ 14:43) (69 - 69)  BP: 146/68 (06-28-23 @ 14:43) (146/68 - 146/68)  RR: 18 (06-28-23 @ 14:43) (18 - 18)  SpO2: 99% (06-28-23 @ 14:43) (99% - 99%)    GENERAL: NAD, lying in bed comfortably  HEAD:  Atraumatic, Normocephalic  EYES: EOMI, PERRLA, conjunctiva and sclera clear  ENT: Moist mucous membranes  NECK: Supple, No JVD  CHEST/LUNG: Clear to auscultation bilaterally; No rales, rhonchi, wheezing, or rubs. Unlabored respirations  HEART: Regular rate and rhythm; No murmurs, rubs, or gallops  ABDOMEN: BSx4; Soft, nontender, nondistended  EXTREMITIES:  2+ Peripheral Pulses, brisk capillary refill. No clubbing, cyanosis, or edema  NERVOUS SYSTEM:  A&Ox3, 5/5 motor bilateral upper and lower extremity, no sensory deficit   SKIN: No rashes or lesions LOS:     VITALS:   T(C): 36.7 (06-28-23 @ 14:43), Max: 36.7 (06-28-23 @ 14:43)  HR: 69 (06-28-23 @ 14:43) (69 - 69)  BP: 146/68 (06-28-23 @ 14:43) (146/68 - 146/68)  RR: 18 (06-28-23 @ 14:43) (18 - 18)  SpO2: 99% (06-28-23 @ 14:43) (99% - 99%)    GENERAL: NAD, lying in bed comfortably  HEAD:  Atraumatic, Normocephalic  EYES: EOMI, PERRLA, conjunctiva and sclera clear  ENT: Moist mucous membranes  NECK: Supple, No JVD  CHEST/LUNG: Clear to auscultation bilaterally; No rales, rhonchi, wheezing, or rubs. Unlabored respirations  HEART: Regular rate and rhythm; No murmurs, rubs, or gallops  ABDOMEN: BSx4; Soft, nontender, nondistended  EXTREMITIES:  2+ Peripheral Pulses, brisk capillary refill. +2LLE  left leg wrapped with knee immobilizer in place.   NERVOUS SYSTEM:  A&Ox3, 5/5 motor bilateral upper and lower extremity, no sensory deficit   SKIN: No rashes or lesions

## 2023-06-28 NOTE — ED ADULT NURSE NOTE - CHIEF COMPLAINT
PAST SURGICAL HISTORY:  H/O cataract removal with insertion of prosthetic lens     H/O umbilical hernia repair     History of tonsillectomy and adenoidectomy     History of vitrectomy left eye 8/19    S/P trigger finger release      The patient is a 83y Female complaining of medical evaluation.

## 2023-06-28 NOTE — ED PROVIDER NOTE - DIFFERENTIAL DIAGNOSIS
Electrolyte abnormalities, dehydration, HERMANN, hyperglycemia, hypoglycemia, symptomatic anemia.  fracture/dislocation. Differential Diagnosis

## 2023-06-28 NOTE — H&P ADULT - NSICDXPASTMEDICALHX_GEN_ALL_CORE_FT
PAST MEDICAL HISTORY:  CAD (coronary artery disease)     Hypertension     MDD (major depressive disorder)     Post poliomyelitis syndrome

## 2023-06-28 NOTE — ED PROVIDER NOTE - PHYSICAL EXAMINATION
CONST: NAD  NECK: Non-tender, no meningeal signs. normal ROM. supple   CARD: S1 S2; No jvd  RESP: Equal BS B/L, No wheezes, rhonchi or rales. No distress  GI: Soft, non-tender, non-distended. normal BS  MS: Normal ROM in all extremities. pulses 2 +. no calf tenderness or swelling  SKIN: Ecchymosis to L knee  NEURO: A&Ox3, No focal deficits. Strength 5/5 with no sensory deficits.

## 2023-06-28 NOTE — CONSULT NOTE ADULT - ASSESSMENT
83 y o F with left tibial plateau fx s/p fall on 6/2/23    - Left knee CT scan  - NWB LLE  - pain control  - DVT ppx  - will d/w att, will follow 83 y o F with left tibial plateau fx s/p fall on 6/2/23    - Left knee CT scan  - NWB LLE  - pain control  - DVT ppx  - will d/w att, will follow  patient seen and examined at bedside with team and family  chart  reviewed  agree with nonop treatment   will need dc planning

## 2023-06-28 NOTE — ED PROVIDER NOTE - OBJECTIVE STATEMENT
83y F pmh CAD, HTN, MDD, Edema presents for placement. Pt had mechanical fall onto L knee on 6/2 since has mild aching pain localized to knee. She has been unable to ambulate since. Was seen by PMD who told her to come to ED to be placed in Rehab. Denies ac, head trauma, cp, sob, weakness, numbness, dysuria, hematuria, n/v/d/c

## 2023-06-28 NOTE — H&P ADULT - HISTORY OF PRESENT ILLNESS
83y F pmh CAD, HTN, MDD, Edema presents for placement. Pt had mechanical fall onto L knee on 6/2 since has mild aching pain localized to knee. She has been unable to ambulate since. Was seen by PMD who told her to come to ED to be placed in Rehab. Denies ac, head trauma, cp, sob, weakness, numbness, dysuria, hematuria, n/v/d/c  83y F pmh CAD, HTN, MDD, LL Edema, postpolio syndrome presents for left knee pain. Pt had mechanical fall onto L knee on 6/2 since has mild aching pain localized to knee. Pt does not remember how she fell. I called daughter who said that pt is bedbound at baseline due to post-polio syndrome. They were repositioning her at home and she slipped on the ground. She complained of left knee pain after the fall, portable xrays were done at home which were normal. Was seen by PMD who told her to come to ED to be placed in Rehab. Denies ac, head trauma, cp, sob, weakness, numbness, dysuria, hematuria, n/v/d/c.     In ED vitals stable   Labs significant for: Hb 9.8, Na 133,    Xray or left knee showed tibial plateau fracture   Evaluated by orthopedics in ED. Knee immobilizer placed

## 2023-06-28 NOTE — H&P ADULT - NSHPLABSRESULTS_GEN_ALL_CORE
LABS:  cret                        9.8    5.49  )-----------( 287      ( 28 Jun 2023 16:42 )             30.0     06-28    133<L>  |  98  |  20  ----------------------------<  112<H>  4.3   |  24  |  0.5<L>    Ca    10.1      28 Jun 2023 16:42    TPro  6.2  /  Alb  4.0  /  TBili  0.4  /  DBili  x   /  AST  22  /  ALT  12  /  AlkPhos  164<H>  06-28

## 2023-06-29 PROCEDURE — 99233 SBSQ HOSP IP/OBS HIGH 50: CPT

## 2023-06-29 RX ORDER — FUROSEMIDE 40 MG
20 TABLET ORAL AT BEDTIME
Refills: 0 | Status: DISCONTINUED | OUTPATIENT
Start: 2023-06-29 | End: 2023-07-01

## 2023-06-29 RX ADMIN — Medication 20 MILLIGRAM(S): at 21:57

## 2023-06-29 RX ADMIN — AMLODIPINE BESYLATE 5 MILLIGRAM(S): 2.5 TABLET ORAL at 06:47

## 2023-06-29 RX ADMIN — DULOXETINE HYDROCHLORIDE 20 MILLIGRAM(S): 30 CAPSULE, DELAYED RELEASE ORAL at 11:45

## 2023-06-29 RX ADMIN — Medication 40 MILLIGRAM(S): at 06:47

## 2023-06-29 RX ADMIN — Medication 81 MILLIGRAM(S): at 11:45

## 2023-06-29 NOTE — PROGRESS NOTE ADULT - SUBJECTIVE AND OBJECTIVE BOX
Progress Note:  Provider Speciality                            Hospitalist      ABBE CROWDER MRN-[id number] 83y Female     CHIEF PRESENTING COMPLAINT:  Patient is a 83y old  Female who presents with a chief complaint of left knee pain post mechanical fall (28 Jun 2023 18:42)        SUBJECTIVE:  Patient was seen and examined at bedside. Reports L knee pain  No significant overnight events reported.     HISTORY OF PRESENTING ILLNESS:  HPI:   83y F pmh CAD, HTN, MDD, LL Edema, postpolio syndrome presents for left knee pain. Pt had mechanical fall onto L knee on 6/2 since has mild aching pain localized to knee. Pt does not remember how she fell. I called daughter who said that pt is bedbound at baseline due to post-polio syndrome. They were repositioning her at home and she slipped on the ground. She complained of left knee pain after the fall, portable xrays were done at home which were normal. Was seen by PMD who told her to come to ED to be placed in Rehab. Denies ac, head trauma, cp, sob, weakness, numbness, dysuria, hematuria, n/v/d/c.     In ED vitals stable   Labs significant for: Hb 9.8, Na 133,    Xray or left knee showed tibial plateau fracture   Evaluated by orthopedics in ED. Knee immobilizer placed  (28 Jun 2023 18:42)        REVIEW OF SYSTEMS:  Patient denies  headache, fever, chills. Denies chest pain, shortness of breath, palpitation. Denies nausea, vomiting, abdominal pain or diarrhoea, Denies dysuria.   At least 10 systems were reviewed in ROS. All systems reviewed  are within normal limits except for the complaints as described in Subjective.    PAST MEDICAL & SURGICAL HISTORY:  PAST MEDICAL & SURGICAL HISTORY:  Hypertension      CAD (coronary artery disease)      MDD (major depressive disorder)      Post poliomyelitis syndrome              VITAL SIGNS:  Vital Signs Last 24 Hrs  T(C): 36.5 (29 Jun 2023 07:17), Max: 36.7 (28 Jun 2023 14:43)  T(F): 97.7 (29 Jun 2023 07:17), Max: 98.1 (28 Jun 2023 14:43)  HR: 63 (29 Jun 2023 07:17) (61 - 69)  BP: 133/60 (29 Jun 2023 07:17) (133/60 - 158/72)  BP(mean): --  RR: 18 (29 Jun 2023 07:17) (18 - 18)  SpO2: 99% (29 Jun 2023 07:17) (98% - 99%)    Parameters below as of 29 Jun 2023 07:17  Patient On (Oxygen Delivery Method): room air              PHYSICAL EXAMINATION:  Not in acute distress, obese  General: No icterus  HEENT:   no JVD.  Heart: S1+S2 audible  Lungs: bilateral  moderate air entry, no wheezing, no crepitations.  Abdomen: Soft, non-tender, non-distended , no  rigidity or guarding.  CNS: Awake alert, CN  grossly intact.  Extremities:  L leg immobilizer in place        CONSULTS:  Consultant(s) Notes Reviewed by me.   Care Discussed with Consultants/Other Providers where required.    All the images and labs were reviewed today        MEDICATIONS:  MEDICATIONS  (STANDING):  amLODIPine   Tablet 5 milliGRAM(s) Oral daily  aspirin  chewable 81 milliGRAM(s) Oral daily  DULoxetine 20 milliGRAM(s) Oral daily  enoxaparin Injectable 40 milliGRAM(s) SubCutaneous every 24 hours  furosemide    Tablet 20 milliGRAM(s) Oral at bedtime  furosemide    Tablet 40 milliGRAM(s) Oral daily  metoprolol succinate ER 25 milliGRAM(s) Oral daily    MEDICATIONS  (PRN):

## 2023-06-29 NOTE — PROGRESS NOTE ADULT - SUBJECTIVE AND OBJECTIVE BOX
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 Pt had mechanical fall onto L knee on 6/2 since has mild aching pain localized to knee. Pt does not remember how she fell. I called daughter who said that pt is bedbound at baseline due to post-polio syndrome. They were repositioning her at home and she slipped on the ground. She complained of left knee pain after the fall, portable xrays were done at home which were normal. Was seen by PMD who told her to come to ED to be placed in Rehab. Denies ac, head trauma, cp, sob, weakness, numbness, dysuria, hematuria, n/v/d/c.     In ED vitals stable   Labs significant for: Hb 9.8, Na 133,    Xray or left knee showed tibial plateau fracture   Evaluated by orthopedics in ED. Knee immobilizer placed  (28 Jun 2023 18:42)      SUBJECTIVE  Patient is a 83y old Female who presents with a chief complaint of left knee pain post mechanical fall (29 Jun 2023 11:24)  Currently admitted to medicine with the primary diagnosis of Tibia fracture      INTERVAL HPI AND OVERNIGHT EVENTS:  Patient was examined and seen at bedside. This morning she is resting comfortably in bed and reports no issues or overnight events.    REVIEW OF SYMPTOMS:  CONSTITUTIONAL: No weakness, fevers or chills; No headaches  EYES: No visual changes, eye pain, or discharge  ENT: No vertigo; No ear pain or change in hearing; No sore throat or difficulty swallowing  NECK: No pain or stiffness  RESPIRATORY: No cough, wheezing, or hemoptysis; No shortness of breath  CARDIOVASCULAR: No chest pain or palpitations  GASTROINTESTINAL: No abdominal or epigastric pain; No nausea, vomiting, or hematemesis; No diarrhea or constipation; No melena or hematochezia  GENITOURINARY: No dysuria, frequency or hematuria  MUSCULOSKELETAL: No joint pain, no muscle pain, no weakness  NEUROLOGICAL: No numbness or weakness  SKIN: No itching or rashes    OBJECTIVE  PAST MEDICAL & SURGICAL HISTORY  Hypertension    CAD (coronary artery disease)    MDD (major depressive disorder)    Post poliomyelitis syndrome      ALLERGIES:  gabapentin (Swelling (Moderate))  penicillin (Rash)    MEDICATIONS:  STANDING MEDICATIONS  amLODIPine   Tablet 5 milliGRAM(s) Oral daily  aspirin  chewable 81 milliGRAM(s) Oral daily  DULoxetine 20 milliGRAM(s) Oral daily  enoxaparin Injectable 40 milliGRAM(s) SubCutaneous every 24 hours  furosemide    Tablet 20 milliGRAM(s) Oral at bedtime  furosemide    Tablet 40 milliGRAM(s) Oral daily  metoprolol succinate ER 25 milliGRAM(s) Oral daily    PRN MEDICATIONS      VITAL SIGNS: Last 24 Hours  T(C): 36.5 (29 Jun 2023 07:17), Max: 36.7 (28 Jun 2023 19:30)  T(F): 97.7 (29 Jun 2023 07:17), Max: 98 (28 Jun 2023 19:30)  HR: 63 (29 Jun 2023 07:17) (61 - 68)  BP: 133/60 (29 Jun 2023 07:17) (133/60 - 158/72)  BP(mean): --  RR: 18 (29 Jun 2023 07:17) (18 - 18)  SpO2: 99% (29 Jun 2023 07:17) (98% - 99%)    LABS:                        9.8    5.49  )-----------( 287      ( 28 Jun 2023 16:42 )             30.0     06-28    133<L>  |  98  |  20  ----------------------------<  112<H>  4.3   |  24  |  0.5<L>    Ca    10.1      28 Jun 2023 16:42    TPro  6.2  /  Alb  4.0  /  TBili  0.4  /  DBili  x   /  AST  22  /  ALT  12  /  AlkPhos  164<H>  06-28      Urinalysis Basic - ( 28 Jun 2023 16:42 )    Color: x / Appearance: x / SG: x / pH: x  Gluc: 112 mg/dL / Ketone: x  / Bili: x / Urobili: x   Blood: x / Protein: x / Nitrite: x   Leuk Esterase: x / RBC: x / WBC x   Sq Epi: x / Non Sq Epi: x / Bacteria: x                RADIOLOGY:      PHYSICAL EXAM:  CONSTITUTIONAL: No acute distress, well-developed, well-groomed, AAOx3  HEAD: Atraumatic, normocephalic  EYES: EOM intact, PERRLA, conjunctiva and sclera clear  ENT: Supple, no masses, no thyromegaly, no bruits, no JVD; moist mucous membranes  PULMONARY: Clear to auscultation bilaterally; no wheezes, rales, or rhonchi  CARDIOVASCULAR: Regular rate and rhythm; no murmurs, rubs, or gallops  GASTROINTESTINAL: Soft, non-tender, non-distended; bowel sounds present  MUSCULOSKELETAL: 2+ peripheral pulses; no clubbing, no cyanosis, no edema  NEUROLOGY: non-focal  SKIN: No rashes or lesions; warm and dry    ASSESSMENT & PLAN  #· Assessment	  83y F pmh CAD, HTN, MDD, LL Edema, postpolio syndrome ( bedbound since 5 years ) presents for left knee pain. Pt had mechanical fall onto L knee on 6/2 since has mild aching pain localized to knee.    #Left tibial plateau fracture   - seen by ortho in ED   - Knee immobilizer placed   - Left knee CT scan: Subacute-appearing Schatzker type VI tibial plateau fracture. Subacute-appearing comminuted fracture of the proximal fibula. Severe osteopenia and severe muscle atrophy.  - f/u ortho-only conservative management  - NWB LLE  - pain control  - DVT ppx    #CAD  - c/w aspirin and metoprolol     #HTN  - c/w norvasc     #MDD   - c/w cymbalta 20 mg     #Bilateral LL edema   - pt admitted in may for   - Pro-.   - Echo with normal EF and no significant valvular dysfunction  - Venous duplex for LE negative for DVT.   - was discharged on lasix 40 mg OD   - currently on lasix 40 mg AM and 20 mg PM     #Post polio syndrome   - bedbound at [facility]     #Normocytic anemia   - anemia workup done in may 2023 normal   - monitor CBC     #DVT ppx: lovenox 40 mg subq  #GI ppx: not indicated   #Diet: DASH   #Dispo: acute

## 2023-06-29 NOTE — PATIENT PROFILE ADULT - FALL HARM RISK - HARM RISK INTERVENTIONS

## 2023-06-30 ENCOUNTER — TRANSCRIPTION ENCOUNTER (OUTPATIENT)
Age: 83
End: 2023-06-30

## 2023-06-30 PROCEDURE — 99233 SBSQ HOSP IP/OBS HIGH 50: CPT

## 2023-06-30 RX ADMIN — Medication 81 MILLIGRAM(S): at 12:31

## 2023-06-30 RX ADMIN — AMLODIPINE BESYLATE 5 MILLIGRAM(S): 2.5 TABLET ORAL at 05:29

## 2023-06-30 RX ADMIN — DULOXETINE HYDROCHLORIDE 20 MILLIGRAM(S): 30 CAPSULE, DELAYED RELEASE ORAL at 12:31

## 2023-06-30 RX ADMIN — ENOXAPARIN SODIUM 40 MILLIGRAM(S): 100 INJECTION SUBCUTANEOUS at 05:25

## 2023-06-30 NOTE — DISCHARGE NOTE PROVIDER - NSDCCPCAREPLAN_GEN_ALL_CORE_FT
PRINCIPAL DISCHARGE DIAGNOSIS  Diagnosis: Tibia fracture  Assessment and Plan of Treatment: She presented for left knee pain following a mechanical fall onto Left knee a month back.  Xray was done in ED which showed left   tibial plateau fracture. Knee immobilizer was placed. CT knee showed  tibial plateau fracture and comminuted proximal fibula  fracture. Ortho recommended only conservative management and we ordered occupational therapy. Patient needs to follow up with   orthopaedics.      SECONDARY DISCHARGE DIAGNOSES  Diagnosis: Inadequate social support  Assessment and Plan of Treatment:

## 2023-06-30 NOTE — OCCUPATIONAL THERAPY INITIAL EVALUATION ADULT - GENERAL OBSERVATIONS, REHAB EVAL
Pt encountered supine in bed +IV lock +primafit +L LE knee immobilizer. Pt daughter at bedside. Pt seen 14:00-14:30

## 2023-06-30 NOTE — OCCUPATIONAL THERAPY INITIAL EVALUATION ADULT - LEVEL OF INDEPENDENCE: DRESS UPPER BODY, OT EVAL
Problem: Patient Care Overview  Goal: Plan of Care Review  Tmax 100.9. Tylenol administered. Blood cultures drawn from each lumen of PAC. 1U plts infused. 1 of 2 units of PRBC infusing. Vanc administered per orders. Cefepime to be administered. Patient sleeping since arrival to floor. C/o back pain 6/10. Tolerating po intake. Mom at bedside. POC reviewed; verbalized understanding. Will continue to monitor.        maximum assist (25% patients effort)

## 2023-06-30 NOTE — OCCUPATIONAL THERAPY INITIAL EVALUATION ADULT - NSOTDISCHREC_GEN_A_CORE
Pt requires assistance with ADLS. OT recommends d/c to rehab facility when medically stable. Refer to IE for details.

## 2023-06-30 NOTE — DISCHARGE NOTE PROVIDER - HOSPITAL COURSE
This patient, 83 years female with PMHx of HTN, MDD, LL edema, post polio syndrome( bedbound since 5 years) presented for left knee pain.  She had mechanical fall onto Left knee a month back since has mild aching pain localized to knee. Xray was done in ED which showed left   tibial plateau fracture. Knee immobilizer was placed. CT knee showd Schatzker type VI tibial plateau fracture and comminuted proximal fibula  fracture. Ortho recommended only conservative management and we ordered occupational therapy. Patient needs to follow up with   orthopaedics. This patient, 83 years female with PMHx of HTN, MDD, LL edema, post polio syndrome( bedbound since 5 years) presented for left knee pain.  She had mechanical fall onto Left knee a month back since has mild aching pain localized to knee. Xray was done in ED which showed left   tibial plateau fracture. Knee immobilizer was placed. CT knee showd Schatzker type VI tibial plateau fracture and comminuted proximal fibula  fracture. Ortho recommended only conservative management and we ordered occupational therapy. She is being discharged to subacute   rehabilitation. Patient needs to follow up with orthopaedics. This patient, 83 years female with PMHx of HTN, MDD, LL edema, post polio syndrome( bedbound since 5 years) presented for left knee pain.  She had mechanical fall onto Left knee a month back since has mild aching pain localized to knee. Xray was done in ED which showed left   tibial plateau fracture. Knee immobilizer was placed. CT knee showed Schatzker type VI tibial plateau fracture and comminuted proximal fibula  fracture. Ortho recommended only conservative management and we ordered occupational therapy. She is being discharged to subacute   rehabilitation. Patient needs to follow up with orthopaedics.    Attending Attestation:  Patient was seen & examined independently. At least 10 systems were reviewed in ROS. All systems reviewed  are within normal limits. Latest vital signs and labs were reviewed today. Case was discussed with house staff in morning rounds for assessment and plan.  Patient is medically stable for discharge . About 32 mins spent on discharge disposition.

## 2023-06-30 NOTE — PROGRESS NOTE ADULT - SUBJECTIVE AND OBJECTIVE BOX
Progress Note:  Provider Speciality                            Hospitalist      ABBE CROWDER MRN-[id number] 83y Female     CHIEF PRESENTING COMPLAINT:  Patient is a 83y old  Female who presents with a chief complaint of left knee pain post mechanical fall (28 Jun 2023 18:42)        SUBJECTIVE:  Patient was seen and examined at bedside. No new complaints described by patient in morning rounds.   No significant overnight events reported.     HISTORY OF PRESENTING ILLNESS:  HPI:   83y F pmh CAD, HTN, MDD, LL Edema, postpolio syndrome presents for left knee pain. Pt had mechanical fall onto L knee on 6/2 since has mild aching pain localized to knee. Pt does not remember how she fell. I called daughter who said that pt is bedbound at baseline due to post-polio syndrome. They were repositioning her at home and she slipped on the ground. She complained of left knee pain after the fall, portable xrays were done at home which were normal. Was seen by PMD who told her to come to ED to be placed in Rehab. Denies ac, head trauma, cp, sob, weakness, numbness, dysuria, hematuria, n/v/d/c.     In ED vitals stable   Labs significant for: Hb 9.8, Na 133,    Xray or left knee showed tibial plateau fracture   Evaluated by orthopedics in ED. Knee immobilizer placed  (28 Jun 2023 18:42)        REVIEW OF SYSTEMS:  Patient denies  headache, fever, chills. Denies chest pain, shortness of breath, palpitation. Denies nausea, vomiting, abdominal pain or diarrhoea, Denies dysuria.   At least 10 systems were reviewed in ROS. All systems reviewed  are within normal limits except for the complaints as described in Subjective.    PAST MEDICAL & SURGICAL HISTORY:  PAST MEDICAL & SURGICAL HISTORY:  Hypertension      CAD (coronary artery disease)      MDD (major depressive disorder)      Post poliomyelitis syndrome              VITAL SIGNS:  Vital Signs Last 24 Hrs  T(C): 36.1 (29 Jun 2023 22:48), Max: 36.8 (29 Jun 2023 15:31)  T(F): 96.9 (29 Jun 2023 22:48), Max: 98.3 (29 Jun 2023 15:31)  HR: 64 (29 Jun 2023 22:48) (58 - 64)  BP: 163/70 (29 Jun 2023 22:48) (151/70 - 163/70)  BP(mean): --  RR: 18 (29 Jun 2023 22:48) (18 - 18)  SpO2: 99% (29 Jun 2023 22:48) (99% - 99%)    Parameters below as of 29 Jun 2023 22:48  Patient On (Oxygen Delivery Method): room air                PHYSICAL EXAMINATION:  Not in acute distress, obese  General: No icterus  HEENT:   no JVD.  Heart: S1+S2 audible  Lungs: bilateral  moderate air entry, no wheezing, no crepitations.  Abdomen: Soft, non-tender, non-distended , no  rigidity or guarding.  CNS: Awake alert, CN  grossly intact.  Extremities:  L leg immobilizer in place        CONSULTS:  Consultant(s) Notes Reviewed by me.   Care Discussed with Consultants/Other Providers where required.    All the images and labs were reviewed today        MEDICATIONS:  MEDICATIONS  (STANDING):  amLODIPine   Tablet 5 milliGRAM(s) Oral daily  aspirin  chewable 81 milliGRAM(s) Oral daily  DULoxetine 20 milliGRAM(s) Oral daily  enoxaparin Injectable 40 milliGRAM(s) SubCutaneous every 24 hours  furosemide    Tablet 20 milliGRAM(s) Oral at bedtime  furosemide    Tablet 40 milliGRAM(s) Oral daily  metoprolol succinate ER 25 milliGRAM(s) Oral daily    MEDICATIONS  (PRN):

## 2023-06-30 NOTE — OCCUPATIONAL THERAPY INITIAL EVALUATION ADULT - ADDITIONAL COMMENTS
As per pt/pt daughter pt has not walked in 5 years. Pt requires 2-3 people for bed moblity. Pt was using sera steady to get from bed to recliner/commode. Pt required total A for hygiene/toileting and LB dressing. since pt fell in June, pt was in bed and has not gotten up.

## 2023-06-30 NOTE — DISCHARGE NOTE PROVIDER - PROVIDER TOKENS
PROVIDER:[TOKEN:[[id number]]] PROVIDER:[TOKEN:[[id number]],FOLLOWUP:[2 weeks]],PROVIDER:[TOKEN:[[id number]],FOLLOWUP:[2 weeks]]

## 2023-06-30 NOTE — OCCUPATIONAL THERAPY INITIAL EVALUATION ADULT - PERTINENT HX OF CURRENT PROBLEM, REHAB EVAL
83y F pmh CAD, HTN, MDD, LL Edema, postpolio syndrome presents for left knee pain. Pt had mechanical fall onto L knee on 6/2 since has mild aching pain localized to knee. Pt does not remember how she fell. I called daughter who said that pt is bedbound at baseline due to post-polio syndrome. They were repositioning her at home and she slipped on the ground. She complained of left knee pain after the fall, portable xrays were done at home which were normal. Was seen by PMD who told her to come to ED to be placed in Rehab. Pt found to have L subacute Schatzker VI tibial plateau fracture x 3 weeks old. Pt NWB LLE in knee immobilizer

## 2023-06-30 NOTE — DISCHARGE NOTE PROVIDER - NSDCMRMEDTOKEN_GEN_ALL_CORE_FT
aspirin 81 mg oral tablet, chewable: 1 tablet with sensor chewed once a day  DULoxetine 20 mg oral delayed release capsule: 1 tab(s) orally once a day  furosemide 40 mg oral tablet: 1 tab(s) orally once a day  Lasix 20 mg oral tablet: 1 tab(s) orally once a day (at bedtime)  methenamine hippurate 1 g oral tablet: 1 tab(s) orally 2 times a day  metoprolol succinate 25 mg oral capsule, extended release: 1 tab(s) orally once a day  Multiple Vitamins with Minerals oral liquid: 1 tab(s) orally once a day  Norvasc 5 mg oral tablet: 1 tab(s) orally once a day  Vitamin D3 50 mcg (2000 intl units) oral tablet: 1 tab(s) orally once a day

## 2023-06-30 NOTE — PROGRESS NOTE ADULT - SUBJECTIVE AND OBJECTIVE BOX
Orthopaedic Surgery Update Note    83M w/ L subacute Schatzker IV medial plateau fracture x 3 weeks old.  - Case discussed with orthopaedic attending surgeon  - KEHINDE LLE in knee immobilizer  - Pain control prn  - FU w/ Dr. Dmitri Jensen at [facility] in 2 weeks. Call [phone] to make appointment. Orthopaedic Surgery Update Note    83F w/ L subacute Schatzker IV medial plateau fracture x 3 weeks old.  - Case discussed with orthopaedic attending surgeon  - KEHINDE LLE in knee immobilizer  - Pain control prn  - FU w/ Dr. Dmitri Jensen at [facility] in 2 weeks. Call [phone] to make appointment. Orthopaedic Surgery Update Note    83F w/ L subacute Schatzker VI tibial plateau fracture x 3 weeks old.  - Case discussed with orthopaedic attending surgeon  - KEHINDE LLE in knee immobilizer  - Pain control prn  - FU w/ Dr. Dmitri Jensen at [address] [facility] in 2 weeks. Call [phone] to make appointment.

## 2023-06-30 NOTE — DISCHARGE NOTE PROVIDER - CARE PROVIDER_API CALL
Natacha Mederos  Geriatric Medicine  [address] [place] [address]  Phone: [phone]  Fax: [phone]  Follow Up Time:    Natacha Mederos  Geriatric Medicine  [address] [place] [address]  Phone: [phone]  Fax: [phone]  Follow Up Time: 2 weeks    Dmitri Jensen  Orthopaedic Surgery  [address] [place] [address]  Phone: [phone]  Fax: [phone]  Follow Up Time: 2 weeks

## 2023-07-01 ENCOUNTER — TRANSCRIPTION ENCOUNTER (OUTPATIENT)
Age: 83
End: 2023-07-01

## 2023-07-01 VITALS
SYSTOLIC BLOOD PRESSURE: 158 MMHG | DIASTOLIC BLOOD PRESSURE: 70 MMHG | RESPIRATION RATE: 20 BRPM | TEMPERATURE: 97 F | HEART RATE: 60 BPM

## 2023-07-01 PROCEDURE — 99233 SBSQ HOSP IP/OBS HIGH 50: CPT

## 2023-07-01 RX ORDER — HYDROMORPHONE HYDROCHLORIDE 2 MG/ML
2 INJECTION INTRAMUSCULAR; INTRAVENOUS; SUBCUTANEOUS EVERY 4 HOURS
Refills: 0 | Status: DISCONTINUED | OUTPATIENT
Start: 2023-07-01 | End: 2023-07-01

## 2023-07-01 RX ORDER — HYDROMORPHONE HYDROCHLORIDE 2 MG/ML
2 INJECTION INTRAMUSCULAR; INTRAVENOUS; SUBCUTANEOUS ONCE
Refills: 0 | Status: DISCONTINUED | OUTPATIENT
Start: 2023-07-01 | End: 2023-07-01

## 2023-07-01 RX ORDER — ACETAMINOPHEN 500 MG
1 TABLET ORAL
Qty: 10 | Refills: 0
Start: 2023-07-01

## 2023-07-01 RX ADMIN — HYDROMORPHONE HYDROCHLORIDE 2 MILLIGRAM(S): 2 INJECTION INTRAMUSCULAR; INTRAVENOUS; SUBCUTANEOUS at 01:39

## 2023-07-01 RX ADMIN — Medication 81 MILLIGRAM(S): at 11:15

## 2023-07-01 RX ADMIN — ENOXAPARIN SODIUM 40 MILLIGRAM(S): 100 INJECTION SUBCUTANEOUS at 07:29

## 2023-07-01 RX ADMIN — DULOXETINE HYDROCHLORIDE 20 MILLIGRAM(S): 30 CAPSULE, DELAYED RELEASE ORAL at 11:15

## 2023-07-01 NOTE — DISCHARGE NOTE NURSING/CASE MANAGEMENT/SOCIAL WORK - NSDCPEFALRISK_GEN_ALL_CORE
For information on Fall & Injury Prevention, visit: [email or web address] OR  [email or web address] OR  https://www.cdc.gov/steadi/patient.html

## 2023-07-01 NOTE — PROGRESS NOTE ADULT - SUBJECTIVE AND OBJECTIVE BOX
Progress Note:  Provider Speciality                            Hospitalist      ABBE CROWDER MRN-[id number] 83y Female     CHIEF PRESENTING COMPLAINT:  Patient is a 83y old  Female who presents with a chief complaint of left knee pain post mechanical fall (28 Jun 2023 18:42)        SUBJECTIVE:  Patient was seen and examined at bedside. No new complaints described by patient in morning rounds.   No significant overnight events reported.     HISTORY OF PRESENTING ILLNESS:  HPI:   83y F pmh CAD, HTN, MDD, LL Edema, postpolio syndrome presents for left knee pain. Pt had mechanical fall onto L knee on 6/2 since has mild aching pain localized to knee. Pt does not remember how she fell. I called daughter who said that pt is bedbound at baseline due to post-polio syndrome. They were repositioning her at home and she slipped on the ground. She complained of left knee pain after the fall, portable xrays were done at home which were normal. Was seen by PMD who told her to come to ED to be placed in Rehab. Denies ac, head trauma, cp, sob, weakness, numbness, dysuria, hematuria, n/v/d/c.     In ED vitals stable   Labs significant for: Hb 9.8, Na 133,    Xray or left knee showed tibial plateau fracture   Evaluated by orthopedics in ED. Knee immobilizer placed  (28 Jun 2023 18:42)        REVIEW OF SYSTEMS:  Patient denies  headache, fever, chills. Denies chest pain, shortness of breath, palpitation. Denies nausea, vomiting, abdominal pain or diarrhoea, Denies dysuria.   At least 10 systems were reviewed in ROS. All systems reviewed  are within normal limits except for the complaints as described in Subjective.    PAST MEDICAL & SURGICAL HISTORY:  PAST MEDICAL & SURGICAL HISTORY:  Hypertension      CAD (coronary artery disease)      MDD (major depressive disorder)      Post poliomyelitis syndrome              VITAL SIGNS:  Vital Signs Last 24 Hrs  T(C): 36.1 (01 Jul 2023 08:32), Max: 36.1 (30 Jun 2023 15:12)  T(F): 97 (01 Jul 2023 08:32), Max: 97 (01 Jul 2023 08:32)  HR: 60 (01 Jul 2023 08:32) (55 - 67)  BP: 158/70 (01 Jul 2023 08:32) (129/58 - 179/79)  BP(mean): --  RR: 20 (01 Jul 2023 08:32) (18 - 20)  SpO2: 100% (01 Jul 2023 00:01) (99% - 100%)    Parameters below as of 01 Jul 2023 00:01  Patient On (Oxygen Delivery Method): room air                  PHYSICAL EXAMINATION:  Not in acute distress, obese  General: No icterus  HEENT:   no JVD.  Heart: S1+S2 audible  Lungs: bilateral  moderate air entry, no wheezing, no crepitations.  Abdomen: Soft, non-tender, non-distended , no  rigidity or guarding.  CNS: Awake alert, CN  grossly intact.  Extremities:  L leg immobilizer in place        CONSULTS:  Consultant(s) Notes Reviewed by me.   Care Discussed with Consultants/Other Providers where required.    All the images and labs were reviewed today        MEDICATIONS:  MEDICATIONS  (STANDING):  amLODIPine   Tablet 5 milliGRAM(s) Oral daily  aspirin  chewable 81 milliGRAM(s) Oral daily  DULoxetine 20 milliGRAM(s) Oral daily  enoxaparin Injectable 40 milliGRAM(s) SubCutaneous every 24 hours  furosemide    Tablet 20 milliGRAM(s) Oral at bedtime  furosemide    Tablet 40 milliGRAM(s) Oral daily  metoprolol succinate ER 25 milliGRAM(s) Oral daily    MEDICATIONS  (PRN):

## 2023-07-01 NOTE — DISCHARGE NOTE NURSING/CASE MANAGEMENT/SOCIAL WORK - PATIENT PORTAL LINK FT
You can access the FollowMyHealth Patient Portal offered by [facility] by registering at the following website: [email or web address]. By joining [facility]’s FollowMyHealth portal, you will also be able to view your health information using other applications (apps) compatible with our system.

## 2023-07-01 NOTE — PROGRESS NOTE ADULT - ASSESSMENT
83y F pmh CAD, HTN, MDD, postpolio syndrome presents for left knee pain      ·	Acute L tibial plateau fracture &  fracture of the proximal fibula.  ·	CAD  ·	Morbid obesity  ·	HTN      ·	Xray or left knee showed tibial plateau fracture   ·	Evaluated by orthopedics in ED. Knee immobilizer placed   ·	Left knee CT scan shows Subacute tibial plateau fracture & comminuted fracture of the proximal fibula.Severe osteopenia.   ·	NWB LLE. No orthopedic surgical intervention recommended  ·	Pain control  ·	No evidence of deep venous thrombosis in either lower extremity.  ·	PT recommends STR  ·	Continue home meds including lasix , Cymbalta and Toprol at current dose  ·	Handoff: Pending STR placement
 83y F pmh CAD, HTN, MDD, postpolio syndrome presents for left knee pain      ·	Acute L tibial plateau fracture &  fracture of the proximal fibula.  ·	CAD  ·	Morbid obesity  ·	HTN      ·	Xray or left knee showed tibial plateau fracture   ·	Evaluated by orthopedics in ED. Knee immobilizer placed   ·	Left knee CT scan shows Subacute tibial plateau fracture & comminuted fracture of the proximal fibula.Severe osteopenia.   ·	NWB LLE. No orthopedic surgical intervention recommended  ·	Pain control  ·	No evidence of deep venous thrombosis in either lower extremity.  ·	PT recommends STR  ·	Continue home meds including lasix , cymbalta and Toprol at current dose  ·	Handoff: Pending STR placement
 83y F pmh CAD, HTN, MDD, postpolio syndrome presents for left knee pain      ·	Acute L tibial plateau fracture &  fracture of the proximal fibula.  ·	CAD  ·	Morbid obesity  ·	HTN      ·	Xray or left knee showed tibial plateau fracture   ·	Evaluated by orthopedics in ED. Knee immobilizer placed   ·	Left knee CT scan shows Subacutetibial plateau fracture & comminuted fracture of the proximal fibula.Severe osteopenia.   ·	NWB LLE  ·	Pain control  ·	No evidence of deep venous thrombosis in either lower extremity.  ·	Ortho recs pending.PT afterwards  ·	Continue home meds including lasix , cymbalta and Toprol at current dose  ·	Handoff: pending Ortho recs regarding whether or not py needs surgical intervention

## 2023-07-01 NOTE — PROGRESS NOTE ADULT - REASON FOR ADMISSION
left knee pain post mechanical fall

## 2023-07-07 PROBLEM — F32.9 MAJOR DEPRESSIVE DISORDER, SINGLE EPISODE, UNSPECIFIED: Chronic | Status: ACTIVE | Noted: 2023-06-28

## 2023-07-07 PROBLEM — G14 POSTPOLIO SYNDROME: Chronic | Status: ACTIVE | Noted: 2023-06-28

## 2023-07-07 PROBLEM — I25.10 ATHEROSCLEROTIC HEART DISEASE OF NATIVE CORONARY ARTERY WITHOUT ANGINA PECTORIS: Chronic | Status: ACTIVE | Noted: 2023-06-28

## 2023-07-07 PROBLEM — I10 ESSENTIAL (PRIMARY) HYPERTENSION: Chronic | Status: ACTIVE | Noted: 2023-06-28

## 2023-07-11 NOTE — CDI QUERY NOTE - NSCDIOTHERTXTBX_GEN_ALL_CORE_HH
Query  Based on your professional judgment and the clinical indicators below, please clarify if left tibial plateau fracture can be further specified as:   •  Fragility left tibial plateau fracture associated with osteopenia   • Pathological left tibial plateau fracture associated with osteopenia   • Osteoporotic left tibial plateau fracture associated with osteopenia   • Other (please specify):  • Unable to further specify left tibial plateau fracture              ================================  Clinical Indicators:  ** 6/28 H&P:        History of present illness: ......... 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 Pt had mechanical fall onto L knee on 6/2 since has mild aching pain localized to knee. Pt does not remember how she fell. I called daughter who said that pt is bedbound at baseline due to post-polio syndrome. They were repositioning her at home and she slipped on the ground. She complained of left knee pain after the fall, portable xrays were done at home which were normal           ** 7/1 PN Attending:         Assessment:         Xray or left knee showed tibial plateau fracture          Left knee CT scan shows Subacute tibial plateau fracture & comminuted fracture of the proximal fibula.Severe osteopenia.     Imaging:  - 6/28 CT left knee:      IMPRESSION:        1.  Subacute-appearing Schatzker type VI tibial plateau fracture. Subacute-appearing comminuted fracture of the proximal fibula.        2.  Severe osteopenia. Severe muscle atrophy.

## 2023-07-21 ENCOUNTER — APPOINTMENT (OUTPATIENT)
Dept: ORTHOPEDIC SURGERY | Facility: CLINIC | Age: 83
End: 2023-07-21
Payer: MEDICARE

## 2023-07-21 DIAGNOSIS — S82.142A DISPLACED BICONDYLAR FRACTURE OF LEFT TIBIA, INITIAL ENCOUNTER FOR CLOSED FRACTURE: ICD-10-CM

## 2023-07-21 PROCEDURE — 73560 X-RAY EXAM OF KNEE 1 OR 2: CPT | Mod: LT

## 2023-07-21 PROCEDURE — 27530 TREAT KNEE FRACTURE: CPT | Mod: LT

## 2023-07-21 PROCEDURE — 99213 OFFICE O/P EST LOW 20 MIN: CPT | Mod: 25

## 2023-07-21 NOTE — IMAGING
[de-identified] :   Pleasant older woman sits placidly in a wheelchair accompanied by her daughter.\par \par Physical examination:\par Left knee:  No gross deformity.  No significant tenderness palpation along the bony prominences of her knee.  Slight varus alignment at maximal extension; range of motion passively 5-90 degrees without discomfort calf soft no cords.  Habitus obscures examination but there does not appear to be an effusion.  She is dependent swelling in her legs.\par \par \par CT left lower extremity reviewed from [facility] June 2nd 2023 demonstrates a Schatzker 5 minimally displaced left bicondylar tibial plateau fracture with profound osteopenia.\par \par Radiographs:\par Left knee (AP, lateral):Fracture appears to be healing in a varus alignment.  There is evidence of interval healing on going.    AP radiograph demonstrates roughly 25 degree varus deformity.

## 2023-07-21 NOTE — ASSESSMENT
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  Clinically she seems to have healed her fracture sufficiently that she can return to her baseline activity. radiographically I think she is healed in a varus alignment but as a non ambulator this is probably not going to provide her with much in the way of disability.  I would allow her to start to [facility] for transfers.  Will have her follow up in my office in 6-8 weeks time for repeat evaluation radiographs.  She is any problems I am happy to see back sooner.  Copy note provided to skilled nursing facility.

## 2023-07-21 NOTE — HISTORY OF PRESENT ILLNESS
[de-identified] :  83-year-old non ambulator status post ground level fall resulting in left bicondylar tibial plateau fracture around June 2, 2023.  Seen at [facility] radiographs and subsequent CT scan confirmed the diagnosis.  Placed in a knee immobilizer the patient presents to my office for management.  She is no longer utilizes a knee immobilizer as it was too hard to maintain this immobilizer and take care of her hygiene.  The patient is at her baseline level of discomfort.  Her daughter does not note any increase in pain anxiety or significant deformity about her knee.

## 2023-09-05 ENCOUNTER — APPOINTMENT (OUTPATIENT)
Dept: ORTHOPEDIC SURGERY | Facility: CLINIC | Age: 83
End: 2023-09-05

## 2023-10-11 ENCOUNTER — APPOINTMENT (OUTPATIENT)
Dept: GERIATRICS | Facility: HOME HEALTH | Age: 83
End: 2023-10-11
Payer: MEDICARE

## 2023-10-11 VITALS
HEART RATE: 80 BPM | RESPIRATION RATE: 18 BRPM | DIASTOLIC BLOOD PRESSURE: 60 MMHG | OXYGEN SATURATION: 100 % | TEMPERATURE: 98.4 F | SYSTOLIC BLOOD PRESSURE: 122 MMHG

## 2023-10-11 DIAGNOSIS — I50.30 UNSPECIFIED DIASTOLIC (CONGESTIVE) HEART FAILURE: ICD-10-CM

## 2023-10-11 PROCEDURE — 99495 TRANSJ CARE MGMT MOD F2F 14D: CPT

## 2023-10-11 RX ORDER — FUROSEMIDE 20 MG/1
20 TABLET ORAL
Refills: 0 | Status: ACTIVE | COMMUNITY

## 2023-10-11 RX ORDER — FOLIC ACID 1 MG/1
1 TABLET ORAL DAILY
Qty: 90 | Refills: 3 | Status: DISCONTINUED | COMMUNITY
Start: 2022-01-27 | End: 2023-10-11

## 2023-10-24 ENCOUNTER — NON-APPOINTMENT (OUTPATIENT)
Age: 83
End: 2023-10-24

## 2023-10-24 DIAGNOSIS — E87.6 HYPOKALEMIA: ICD-10-CM

## 2023-10-24 DIAGNOSIS — D64.9 ANEMIA, UNSPECIFIED: ICD-10-CM

## 2023-10-24 LAB
ALBUMIN SERPL ELPH-MCNC: 3.5 G/DL
ALP BLD-CCNC: 77 U/L
ALT SERPL-CCNC: 6 U/L
ANION GAP SERPL CALC-SCNC: 10 MMOL/L
AST SERPL-CCNC: 10 U/L
BASOPHILS # BLD AUTO: 0.06 K/UL
BASOPHILS NFR BLD AUTO: 0.7 %
BILIRUB SERPL-MCNC: 0.6 MG/DL
BUN SERPL-MCNC: 5 MG/DL
CALCIUM SERPL-MCNC: 9.5 MG/DL
CHLORIDE SERPL-SCNC: 97 MMOL/L
CHOLEST SERPL-MCNC: 144 MG/DL
CO2 SERPL-SCNC: 32 MMOL/L
CREAT SERPL-MCNC: 0.5 MG/DL
EGFR: 92 ML/MIN/1.73M2
EOSINOPHIL # BLD AUTO: 0.18 K/UL
EOSINOPHIL NFR BLD AUTO: 2.2 %
ESTIMATED AVERAGE GLUCOSE: 108 MG/DL
FOLATE SERPL-MCNC: 6.2 NG/ML
GLUCOSE SERPL-MCNC: 96 MG/DL
HBA1C MFR BLD HPLC: 5.4 %
HCT VFR BLD CALC: 30 %
HDLC SERPL-MCNC: 44 MG/DL
HGB BLD-MCNC: 9.6 G/DL
IMM GRANULOCYTES NFR BLD AUTO: 0.5 %
LDLC SERPL CALC-MCNC: 88 MG/DL
LYMPHOCYTES # BLD AUTO: 1.04 K/UL
LYMPHOCYTES NFR BLD AUTO: 13 %
MAN DIFF?: NORMAL
MCHC RBC-ENTMCNC: 27.7 PG
MCHC RBC-ENTMCNC: 32 G/DL
MCV RBC AUTO: 86.7 FL
MONOCYTES # BLD AUTO: 1.01 K/UL
MONOCYTES NFR BLD AUTO: 12.6 %
NEUTROPHILS # BLD AUTO: 5.68 K/UL
NEUTROPHILS NFR BLD AUTO: 71 %
NONHDLC SERPL-MCNC: 100 MG/DL
PLATELET # BLD AUTO: 303 K/UL
POTASSIUM SERPL-SCNC: 3 MMOL/L
PROT SERPL-MCNC: 5.8 G/DL
RBC # BLD: 3.46 M/UL
RBC # FLD: 14.2 %
SODIUM SERPL-SCNC: 139 MMOL/L
T3 SERPL-MCNC: 89 NG/DL
T4 FREE SERPL-MCNC: 1.3 NG/DL
TRIGL SERPL-MCNC: 62 MG/DL
TSH SERPL-ACNC: 0.46 UIU/ML
VIT B12 SERPL-MCNC: 542 PG/ML
WBC # FLD AUTO: 8.01 K/UL

## 2023-10-24 RX ORDER — POTASSIUM CHLORIDE 1500 MG/1
20 TABLET, EXTENDED RELEASE ORAL
Qty: 4 | Refills: 0 | Status: ACTIVE | COMMUNITY
Start: 2023-10-24 | End: 1900-01-01

## 2023-11-22 ENCOUNTER — NON-APPOINTMENT (OUTPATIENT)
Age: 83
End: 2023-11-22

## 2023-11-23 LAB
ANION GAP SERPL CALC-SCNC: 11 MMOL/L
BASOPHILS # BLD AUTO: 0.07 K/UL
BASOPHILS NFR BLD AUTO: 1.2 %
BUN SERPL-MCNC: 12 MG/DL
CALCIUM SERPL-MCNC: 10.1 MG/DL
CHLORIDE SERPL-SCNC: 102 MMOL/L
CO2 SERPL-SCNC: 28 MMOL/L
CREAT SERPL-MCNC: 0.6 MG/DL
EGFR: 88 ML/MIN/1.73M2
EOSINOPHIL # BLD AUTO: 0.42 K/UL
EOSINOPHIL NFR BLD AUTO: 7.1 %
FERRITIN SERPL-MCNC: 82 NG/ML
GLUCOSE SERPL-MCNC: 75 MG/DL
HCT VFR BLD CALC: 35.1 %
HGB BLD-MCNC: 11 G/DL
IMM GRANULOCYTES NFR BLD AUTO: 0.5 %
IRON SATN MFR SERPL: 18 %
IRON SERPL-MCNC: 45 UG/DL
LYMPHOCYTES # BLD AUTO: 1.51 K/UL
LYMPHOCYTES NFR BLD AUTO: 25.5 %
MAN DIFF?: NORMAL
MCHC RBC-ENTMCNC: 28.1 PG
MCHC RBC-ENTMCNC: 31.3 G/DL
MCV RBC AUTO: 89.5 FL
MONOCYTES # BLD AUTO: 0.54 K/UL
MONOCYTES NFR BLD AUTO: 9.1 %
NEUTROPHILS # BLD AUTO: 3.35 K/UL
NEUTROPHILS NFR BLD AUTO: 56.6 %
PLATELET # BLD AUTO: 228 K/UL
POTASSIUM SERPL-SCNC: 3.7 MMOL/L
RBC # BLD: 3.92 M/UL
RBC # BLD: 3.92 M/UL
RBC # FLD: 16.3 %
RETICS # AUTO: 1.2 %
RETICS AGGREG/RBC NFR: 48.6 K/UL
SODIUM SERPL-SCNC: 141 MMOL/L
TIBC SERPL-MCNC: 246 UG/DL
TRANSFERRIN SERPL-MCNC: 212 MG/DL
UIBC SERPL-MCNC: 201 UG/DL
WBC # FLD AUTO: 5.92 K/UL

## 2023-12-11 ENCOUNTER — NON-APPOINTMENT (OUTPATIENT)
Age: 83
End: 2023-12-11

## 2023-12-11 DIAGNOSIS — K59.00 CONSTIPATION, UNSPECIFIED: ICD-10-CM

## 2023-12-11 DIAGNOSIS — E66.01 MORBID (SEVERE) OBESITY DUE TO EXCESS CALORIES: ICD-10-CM

## 2023-12-13 ENCOUNTER — APPOINTMENT (OUTPATIENT)
Dept: GERIATRICS | Facility: HOME HEALTH | Age: 83
End: 2023-12-13
Payer: MEDICARE

## 2023-12-13 VITALS
SYSTOLIC BLOOD PRESSURE: 120 MMHG | OXYGEN SATURATION: 98 % | TEMPERATURE: 97.2 F | DIASTOLIC BLOOD PRESSURE: 74 MMHG | RESPIRATION RATE: 16 BRPM | HEART RATE: 68 BPM

## 2023-12-13 PROBLEM — K59.00 CONSTIPATION, UNSPECIFIED CONSTIPATION TYPE: Status: ACTIVE | Noted: 2020-06-23

## 2023-12-13 PROCEDURE — 99349 HOME/RES VST EST MOD MDM 40: CPT

## 2023-12-13 RX ORDER — LACTULOSE 10 G/15ML
10 SOLUTION ORAL DAILY
Qty: 960 | Refills: 3 | Status: ACTIVE | COMMUNITY
Start: 2023-12-13 | End: 1900-01-01

## 2024-01-05 ENCOUNTER — APPOINTMENT (OUTPATIENT)
Dept: GERIATRICS | Facility: HOME HEALTH | Age: 84
End: 2024-01-05
Payer: MEDICARE

## 2024-01-05 PROCEDURE — 99441: CPT | Mod: 95

## 2024-03-07 ENCOUNTER — APPOINTMENT (OUTPATIENT)
Dept: GERIATRICS | Facility: HOME HEALTH | Age: 84
End: 2024-03-07
Payer: MEDICARE

## 2024-03-07 ENCOUNTER — NON-APPOINTMENT (OUTPATIENT)
Age: 84
End: 2024-03-07

## 2024-03-07 PROCEDURE — 99441: CPT | Mod: 93

## 2024-03-10 ENCOUNTER — INPATIENT (INPATIENT)
Facility: HOSPITAL | Age: 84
LOS: 3 days | Discharge: HOME HEALTH | DRG: 689 | End: 2024-03-14
Attending: STUDENT IN AN ORGANIZED HEALTH CARE EDUCATION/TRAINING PROGRAM | Admitting: INTERNAL MEDICINE
Payer: MEDICARE

## 2024-03-10 VITALS
TEMPERATURE: 98 F | DIASTOLIC BLOOD PRESSURE: 95 MMHG | HEART RATE: 70 BPM | RESPIRATION RATE: 18 BRPM | SYSTOLIC BLOOD PRESSURE: 161 MMHG | OXYGEN SATURATION: 99 %

## 2024-03-10 DIAGNOSIS — N39.0 URINARY TRACT INFECTION, SITE NOT SPECIFIED: ICD-10-CM

## 2024-03-10 LAB
ALBUMIN SERPL ELPH-MCNC: 3.7 G/DL — SIGNIFICANT CHANGE UP (ref 3.5–5.2)
ALP SERPL-CCNC: 68 U/L — SIGNIFICANT CHANGE UP (ref 30–115)
ALT FLD-CCNC: 6 U/L — SIGNIFICANT CHANGE UP (ref 0–41)
ANION GAP SERPL CALC-SCNC: 11 MMOL/L — SIGNIFICANT CHANGE UP (ref 7–14)
APPEARANCE UR: ABNORMAL
APTT BLD: 30 SEC — SIGNIFICANT CHANGE UP (ref 27–39.2)
AST SERPL-CCNC: 11 U/L — SIGNIFICANT CHANGE UP (ref 0–41)
BACTERIA # UR AUTO: ABNORMAL /HPF
BASE EXCESS BLDV CALC-SCNC: 3 MMOL/L — SIGNIFICANT CHANGE UP (ref -2–3)
BASOPHILS # BLD AUTO: 0.05 K/UL — SIGNIFICANT CHANGE UP (ref 0–0.2)
BASOPHILS NFR BLD AUTO: 0.7 % — SIGNIFICANT CHANGE UP (ref 0–1)
BILIRUB SERPL-MCNC: 0.9 MG/DL — SIGNIFICANT CHANGE UP (ref 0.2–1.2)
BILIRUB UR-MCNC: NEGATIVE — SIGNIFICANT CHANGE UP
BUN SERPL-MCNC: 8 MG/DL — LOW (ref 10–20)
CA-I SERPL-SCNC: 1.22 MMOL/L — SIGNIFICANT CHANGE UP (ref 1.15–1.33)
CALCIUM SERPL-MCNC: 9.6 MG/DL — SIGNIFICANT CHANGE UP (ref 8.4–10.5)
CAST: >63 /LPF — HIGH (ref 0–4)
CHLORIDE SERPL-SCNC: 98 MMOL/L — SIGNIFICANT CHANGE UP (ref 98–110)
CO2 SERPL-SCNC: 27 MMOL/L — SIGNIFICANT CHANGE UP (ref 17–32)
COLOR SPEC: YELLOW — SIGNIFICANT CHANGE UP
CREAT SERPL-MCNC: 0.6 MG/DL — LOW (ref 0.7–1.5)
DIFF PNL FLD: ABNORMAL
EGFR: 88 ML/MIN/1.73M2 — SIGNIFICANT CHANGE UP
EOSINOPHIL # BLD AUTO: 0.09 K/UL — SIGNIFICANT CHANGE UP (ref 0–0.7)
EOSINOPHIL NFR BLD AUTO: 1.3 % — SIGNIFICANT CHANGE UP (ref 0–8)
GAS PNL BLDV: 128 MMOL/L — LOW (ref 136–145)
GAS PNL BLDV: SIGNIFICANT CHANGE UP
GAS PNL BLDV: SIGNIFICANT CHANGE UP
GLUCOSE SERPL-MCNC: 93 MG/DL — SIGNIFICANT CHANGE UP (ref 70–99)
GLUCOSE UR QL: NEGATIVE MG/DL — SIGNIFICANT CHANGE UP
HCO3 BLDV-SCNC: 29 MMOL/L — SIGNIFICANT CHANGE UP (ref 22–29)
HCT VFR BLD CALC: 36.7 % — LOW (ref 37–47)
HCT VFR BLDA CALC: 62 % — CRITICAL HIGH (ref 34.5–46.5)
HGB BLD CALC-MCNC: >20 G/DL — CRITICAL HIGH (ref 11.7–16.1)
HGB BLD-MCNC: 12.2 G/DL — SIGNIFICANT CHANGE UP (ref 12–16)
IMM GRANULOCYTES NFR BLD AUTO: 0.4 % — HIGH (ref 0.1–0.3)
INR BLD: 1.08 RATIO — SIGNIFICANT CHANGE UP (ref 0.65–1.3)
KETONES UR-MCNC: NEGATIVE MG/DL — SIGNIFICANT CHANGE UP
LACTATE BLDV-MCNC: 1.1 MMOL/L — SIGNIFICANT CHANGE UP (ref 0.5–2)
LEUKOCYTE ESTERASE UR-ACNC: ABNORMAL
LYMPHOCYTES # BLD AUTO: 1.21 K/UL — SIGNIFICANT CHANGE UP (ref 1.2–3.4)
LYMPHOCYTES # BLD AUTO: 17.3 % — LOW (ref 20.5–51.1)
MCHC RBC-ENTMCNC: 29.7 PG — SIGNIFICANT CHANGE UP (ref 27–31)
MCHC RBC-ENTMCNC: 33.2 G/DL — SIGNIFICANT CHANGE UP (ref 32–37)
MCV RBC AUTO: 89.3 FL — SIGNIFICANT CHANGE UP (ref 81–99)
MONOCYTES # BLD AUTO: 1.1 K/UL — HIGH (ref 0.1–0.6)
MONOCYTES NFR BLD AUTO: 15.7 % — HIGH (ref 1.7–9.3)
NEUTROPHILS # BLD AUTO: 4.53 K/UL — SIGNIFICANT CHANGE UP (ref 1.4–6.5)
NEUTROPHILS NFR BLD AUTO: 64.6 % — SIGNIFICANT CHANGE UP (ref 42.2–75.2)
NITRITE UR-MCNC: NEGATIVE — SIGNIFICANT CHANGE UP
NRBC # BLD: 0 /100 WBCS — SIGNIFICANT CHANGE UP (ref 0–0)
PCO2 BLDV: 47 MMHG — HIGH (ref 39–42)
PH BLDV: 7.4 — SIGNIFICANT CHANGE UP (ref 7.32–7.43)
PH UR: 7.5 — SIGNIFICANT CHANGE UP (ref 5–8)
PLATELET # BLD AUTO: 172 K/UL — SIGNIFICANT CHANGE UP (ref 130–400)
PMV BLD: 9.9 FL — SIGNIFICANT CHANGE UP (ref 7.4–10.4)
PO2 BLDV: 36 MMHG — SIGNIFICANT CHANGE UP (ref 25–45)
POTASSIUM BLDV-SCNC: 2.8 MMOL/L — CRITICAL LOW (ref 3.5–5.1)
POTASSIUM SERPL-MCNC: 3.4 MMOL/L — LOW (ref 3.5–5)
POTASSIUM SERPL-SCNC: 3.4 MMOL/L — LOW (ref 3.5–5)
PROT SERPL-MCNC: 6.2 G/DL — SIGNIFICANT CHANGE UP (ref 6–8)
PROT UR-MCNC: 100 MG/DL
PROTHROM AB SERPL-ACNC: 12.3 SEC — SIGNIFICANT CHANGE UP (ref 9.95–12.87)
RBC # BLD: 4.11 M/UL — LOW (ref 4.2–5.4)
RBC # FLD: 14.1 % — SIGNIFICANT CHANGE UP (ref 11.5–14.5)
RBC CASTS # UR COMP ASSIST: 19 /HPF — HIGH (ref 0–4)
SAO2 % BLDV: 69.4 % — SIGNIFICANT CHANGE UP (ref 67–88)
SODIUM SERPL-SCNC: 136 MMOL/L — SIGNIFICANT CHANGE UP (ref 135–146)
SP GR SPEC: 1.01 — SIGNIFICANT CHANGE UP (ref 1–1.03)
SQUAMOUS # UR AUTO: 35 /HPF — HIGH (ref 0–5)
TROPONIN T, HIGH SENSITIVITY RESULT: 19 NG/L — HIGH (ref 6–13)
TROPONIN T, HIGH SENSITIVITY RESULT: 20 NG/L — HIGH (ref 6–13)
UROBILINOGEN FLD QL: 1 MG/DL — SIGNIFICANT CHANGE UP (ref 0.2–1)
WBC # BLD: 7.01 K/UL — SIGNIFICANT CHANGE UP (ref 4.8–10.8)
WBC # FLD AUTO: 7.01 K/UL — SIGNIFICANT CHANGE UP (ref 4.8–10.8)
WBC UR QL: 336 /HPF — HIGH (ref 0–5)

## 2024-03-10 PROCEDURE — 84443 ASSAY THYROID STIM HORMONE: CPT

## 2024-03-10 PROCEDURE — 84484 ASSAY OF TROPONIN QUANT: CPT

## 2024-03-10 PROCEDURE — 84100 ASSAY OF PHOSPHORUS: CPT

## 2024-03-10 PROCEDURE — 80053 COMPREHEN METABOLIC PANEL: CPT

## 2024-03-10 PROCEDURE — 76770 US EXAM ABDO BACK WALL COMP: CPT

## 2024-03-10 PROCEDURE — 36415 COLL VENOUS BLD VENIPUNCTURE: CPT

## 2024-03-10 PROCEDURE — 99285 EMERGENCY DEPT VISIT HI MDM: CPT | Mod: FS

## 2024-03-10 PROCEDURE — 87086 URINE CULTURE/COLONY COUNT: CPT

## 2024-03-10 PROCEDURE — 85730 THROMBOPLASTIN TIME PARTIAL: CPT

## 2024-03-10 PROCEDURE — 93306 TTE W/DOPPLER COMPLETE: CPT

## 2024-03-10 PROCEDURE — 70450 CT HEAD/BRAIN W/O DYE: CPT | Mod: 26,MC

## 2024-03-10 PROCEDURE — 80061 LIPID PANEL: CPT

## 2024-03-10 PROCEDURE — 93005 ELECTROCARDIOGRAM TRACING: CPT

## 2024-03-10 PROCEDURE — 85025 COMPLETE CBC W/AUTO DIFF WBC: CPT

## 2024-03-10 PROCEDURE — 83735 ASSAY OF MAGNESIUM: CPT

## 2024-03-10 PROCEDURE — 82306 VITAMIN D 25 HYDROXY: CPT

## 2024-03-10 PROCEDURE — 85610 PROTHROMBIN TIME: CPT

## 2024-03-10 PROCEDURE — 76770 US EXAM ABDO BACK WALL COMP: CPT | Mod: 26

## 2024-03-10 PROCEDURE — 83036 HEMOGLOBIN GLYCOSYLATED A1C: CPT

## 2024-03-10 PROCEDURE — 97530 THERAPEUTIC ACTIVITIES: CPT | Mod: GP

## 2024-03-10 PROCEDURE — 93010 ELECTROCARDIOGRAM REPORT: CPT

## 2024-03-10 PROCEDURE — 97163 PT EVAL HIGH COMPLEX 45 MIN: CPT | Mod: GP

## 2024-03-10 PROCEDURE — 99223 1ST HOSP IP/OBS HIGH 75: CPT

## 2024-03-10 PROCEDURE — 97167 OT EVAL HIGH COMPLEX 60 MIN: CPT | Mod: GO

## 2024-03-10 PROCEDURE — 81001 URINALYSIS AUTO W/SCOPE: CPT

## 2024-03-10 RX ORDER — ACETAMINOPHEN 500 MG
975 TABLET ORAL ONCE
Refills: 0 | Status: COMPLETED | OUTPATIENT
Start: 2024-03-10 | End: 2024-03-10

## 2024-03-10 RX ORDER — AMLODIPINE BESYLATE 2.5 MG/1
5 TABLET ORAL DAILY
Refills: 0 | Status: DISCONTINUED | OUTPATIENT
Start: 2024-03-10 | End: 2024-03-14

## 2024-03-10 RX ORDER — MINERAL OIL
133 OIL (ML) MISCELLANEOUS DAILY
Refills: 0 | Status: DISCONTINUED | OUTPATIENT
Start: 2024-03-10 | End: 2024-03-14

## 2024-03-10 RX ORDER — FUROSEMIDE 40 MG
1 TABLET ORAL
Refills: 0 | DISCHARGE

## 2024-03-10 RX ORDER — LANOLIN ALCOHOL/MO/W.PET/CERES
3 CREAM (GRAM) TOPICAL AT BEDTIME
Refills: 0 | Status: DISCONTINUED | OUTPATIENT
Start: 2024-03-10 | End: 2024-03-14

## 2024-03-10 RX ORDER — ENOXAPARIN SODIUM 100 MG/ML
40 INJECTION SUBCUTANEOUS EVERY 24 HOURS
Refills: 0 | Status: DISCONTINUED | OUTPATIENT
Start: 2024-03-10 | End: 2024-03-11

## 2024-03-10 RX ORDER — CHOLECALCIFEROL (VITAMIN D3) 125 MCG
1 CAPSULE ORAL
Refills: 0 | DISCHARGE

## 2024-03-10 RX ORDER — DULOXETINE HYDROCHLORIDE 30 MG/1
20 CAPSULE, DELAYED RELEASE ORAL DAILY
Refills: 0 | Status: DISCONTINUED | OUTPATIENT
Start: 2024-03-10 | End: 2024-03-14

## 2024-03-10 RX ORDER — ONDANSETRON 8 MG/1
4 TABLET, FILM COATED ORAL EVERY 8 HOURS
Refills: 0 | Status: DISCONTINUED | OUTPATIENT
Start: 2024-03-10 | End: 2024-03-14

## 2024-03-10 RX ORDER — METOPROLOL TARTRATE 50 MG
1 TABLET ORAL
Refills: 0 | DISCHARGE

## 2024-03-10 RX ORDER — FUROSEMIDE 40 MG
0 TABLET ORAL
Qty: 0 | Refills: 1 | DISCHARGE

## 2024-03-10 RX ORDER — METHENAMINE MANDELATE 1 G
0 TABLET ORAL
Qty: 0 | Refills: 3 | DISCHARGE

## 2024-03-10 RX ORDER — CHOLECALCIFEROL (VITAMIN D3) 125 MCG
2000 CAPSULE ORAL DAILY
Refills: 0 | Status: DISCONTINUED | OUTPATIENT
Start: 2024-03-10 | End: 2024-03-10

## 2024-03-10 RX ORDER — DULOXETINE HYDROCHLORIDE 30 MG/1
0 CAPSULE, DELAYED RELEASE ORAL
Qty: 0 | Refills: 2 | DISCHARGE

## 2024-03-10 RX ORDER — AZTREONAM 2 G
2000 VIAL (EA) INJECTION ONCE
Refills: 0 | Status: COMPLETED | OUTPATIENT
Start: 2024-03-10 | End: 2024-03-10

## 2024-03-10 RX ORDER — ASPIRIN/CALCIUM CARB/MAGNESIUM 324 MG
81 TABLET ORAL DAILY
Refills: 0 | Status: DISCONTINUED | OUTPATIENT
Start: 2024-03-10 | End: 2024-03-14

## 2024-03-10 RX ORDER — LACTULOSE 10 G/15ML
20 SOLUTION ORAL THREE TIMES A DAY
Refills: 0 | Status: DISCONTINUED | OUTPATIENT
Start: 2024-03-10 | End: 2024-03-14

## 2024-03-10 RX ORDER — FUROSEMIDE 40 MG
40 TABLET ORAL DAILY
Refills: 0 | Status: DISCONTINUED | OUTPATIENT
Start: 2024-03-10 | End: 2024-03-14

## 2024-03-10 RX ORDER — FUROSEMIDE 40 MG
20 TABLET ORAL AT BEDTIME
Refills: 0 | Status: DISCONTINUED | OUTPATIENT
Start: 2024-03-11 | End: 2024-03-14

## 2024-03-10 RX ORDER — ACETAMINOPHEN 500 MG
650 TABLET ORAL EVERY 6 HOURS
Refills: 0 | Status: DISCONTINUED | OUTPATIENT
Start: 2024-03-10 | End: 2024-03-14

## 2024-03-10 RX ORDER — ASPIRIN/CALCIUM CARB/MAGNESIUM 324 MG
1 TABLET ORAL
Refills: 0 | DISCHARGE

## 2024-03-10 RX ORDER — METOPROLOL TARTRATE 50 MG
50 TABLET ORAL DAILY
Refills: 0 | Status: DISCONTINUED | OUTPATIENT
Start: 2024-03-10 | End: 2024-03-14

## 2024-03-10 RX ORDER — METHENAMINE MANDELATE 1 G
1 TABLET ORAL
Refills: 0 | DISCHARGE

## 2024-03-10 RX ORDER — POTASSIUM CHLORIDE 20 MEQ
40 PACKET (EA) ORAL ONCE
Refills: 0 | Status: COMPLETED | OUTPATIENT
Start: 2024-03-10 | End: 2024-03-10

## 2024-03-10 RX ORDER — AMLODIPINE BESYLATE 2.5 MG/1
0 TABLET ORAL
Qty: 0 | Refills: 1 | DISCHARGE

## 2024-03-10 RX ADMIN — LACTULOSE 20 GRAM(S): 10 SOLUTION ORAL at 22:47

## 2024-03-10 RX ADMIN — Medication 133 MILLILITER(S): at 18:15

## 2024-03-10 RX ADMIN — Medication 2000 MILLIGRAM(S): at 15:45

## 2024-03-10 RX ADMIN — Medication 40 MILLIEQUIVALENT(S): at 18:15

## 2024-03-10 RX ADMIN — Medication 5 MILLIGRAM(S): at 18:15

## 2024-03-10 RX ADMIN — LACTULOSE 20 GRAM(S): 10 SOLUTION ORAL at 18:15

## 2024-03-10 RX ADMIN — Medication 975 MILLIGRAM(S): at 13:00

## 2024-03-10 RX ADMIN — Medication 975 MILLIGRAM(S): at 14:00

## 2024-03-10 RX ADMIN — Medication 100 MILLIGRAM(S): at 15:07

## 2024-03-10 NOTE — ED ADULT NURSE NOTE - NSFALLHARMRISKINTERV_ED_ALL_ED
Assistance OOB with selected safe patient handling equipment if applicable/Assistance with ambulation/Communicate risk of Fall with Harm to all staff, patient, and family/Encourage patient to sit up slowly, dangle for a short time, stand at bedside before walking/Monitor gait and stability/Monitor for mental status changes and reorient to person, place, and time, as needed/Move patient closer to nursing station/within visual sight of ED staff/Orthostatic vital signs/Provide visual cue: red socks, yellow wristband, yellow gown, etc/Reinforce activity limits and safety measures with patient and family/Review medications for side effects contributing to fall risk/Toileting schedule using arm’s reach rule for commode and bathroom/Use of alarms - bed, stretcher, chair and/or video monitoring/Bed in lowest position, wheels locked, appropriate side rails in place/Call bell, personal items and telephone in reach/Instruct patient to call for assistance before getting out of bed/chair/stretcher/Non-slip footwear applied when patient is off stretcher/[place] to call system/Physically safe environment - no spills, clutter or unnecessary equipment/Purposeful Proactive Rounding/Room/bathroom lighting operational, light cord in reach

## 2024-03-10 NOTE — H&P ADULT - ASSESSMENT
84 yo female with a PMH of HTN, CAD, recurrent UTI presents for confusion and pelvic pain for 2 days.    #Complicated UTI  #Recurrent UTI - Multisensitive E.coli  #Toxic Metabolic Encephalopathy  - UA positive -> f/u urine culture  - On Methenamine for UTI  prophylaxis will hold  - Will start Ceftriaxone 1g  - CT head negative  - Check bladder US  - AVoid opioids, benzos and anticholinergics  - PT consult    #Hypokalemia  #Decreased Appetite  #Vitamin D deficiency  - Replenish Electrolytes  - Start Multivitamin and Vitamin D    #CAD  #HLD  - Continue ASA and Metoprolol  - Check Lipid profile - Should be on moderate to high intensity statin    #HTN  - continue amlodipine    #Depression  - Continue Duloxetine    DVT ppx: Lovenox  GI ppx: Famotidine  DIet: DASH  Dispo: MED 84 yo female with a PMH of HTN, CAD, recurrent UTI presents for confusion and pelvic pain for 2 days.    #Complicated UTI  #Recurrent UTI - Multisensitive E.coli  #Toxic Metabolic Encephalopathy  - UA positive -> f/u urine culture  - On Methenamine for UTI  prophylaxis will hold  - Will start Ceftriaxone 1g  - CT head negative  - Check bladder US  - AVoid opioids, benzos and anticholinergics  - PT consult    #Severe Constipation  - Contributing to TEM and UTI  - Start Lactulose 20 TID and Dulcolax 5 mg BID  - Mineral oil enema  - Would transition to miralax at home after resolution as it has less side effects    #Hypokalemia  #Decreased Appetite  #Vitamin D deficiency  - Replenish Electrolytes  - Start Multivitamin   - Hold Vitamin D 5000u and check levels as 5000u daily is a high dose which may be contributing to constipation    #CAD  #HLD  - Continue ASA and Metoprolol  - Check Lipid profile - Should be on moderate to high intensity statin    #HTN  - continue amlodipine    #Depression  - Continue Duloxetine    DVT ppx: Lovenox  GI ppx: Famotidine  DIet: DASH  Dispo: MED 84 yo female with a PMH of HTN, CAD, recurrent UTI presents for confusion and pelvic pain for 2 days.    #Complicated UTI  #Recurrent UTI - Multisensitive E.coli  #Toxic Metabolic Encephalopathy  - UA positive -> f/u urine culture  - On Methenamine for UTI  prophylaxis will hold  - Will start Ceftriaxone 1g  - CT head negative  - Check bladder US  - AVoid opioids, benzos and anticholinergics  - PT consult    #Severe Constipation  - Contributing to TEM and UTI  - Start Lactulose 20 TID and Dulcolax 5 mg BID  - Mineral oil enema  - Would transition to miralax at home after resolution as it has less side effects    #Hypokalemia  #Decreased Appetite  #Vitamin D deficiency  - Replenish Electrolytes  - Start Multivitamin   - Hold Vitamin D 5000u and check levels as 5000u daily is a high dose which may be contributing to constipation    #CAD  #HFpEF  #HLD  - Continue ASA and Metoprolol  - COntinue LAsix 40 mg AM and 20 mg PM  - Check Lipid profile - Should be on moderate to high intensity statin    #HTN  - continue amlodipine 5 mg daily    #Depression  - Continue Duloxetine 20 mg daily    DVT ppx: Lovenox  GI ppx: Famotidine  DIet: DASH  Dispo: MED 84 yo female with a PMH of HTN, CAD, recurrent UTI presents for confusion and pelvic pain for 2 days.    #Complicated UTI  #Recurrent UTI - Multisensitive E.coli  #Toxic Metabolic Encephalopathy  - UA positive -> f/u urine culture  - On Methenamine for UTI  prophylaxis will hold  - Will start Ceftriaxone 1g  - CT head negative  - Check bladder US  - AVoid opioids, benzos and anticholinergics  - PT consult    #Severe Constipation  - Contributing to TEM and UTI  - Start Lactulose 20 TID and Dulcolax 5 mg BID  - Mineral oil enema  - Would transition to miralax at home after resolution as it has less side effects    #Hypokalemia  #Decreased Appetite  #Vitamin D deficiency  - Replenish Electrolytes  - Start Multivitamin   - Hold Vitamin D 5000u and check levels as 5000u daily is a high dose which may be contributing to constipation    #CAD  #HFpEF  #HLD  - ECG with PAC; less likely AF -> Repeat tomorrow and tele monitoring  - Continue ASA and Metoprolol  - COntinue LAsix 40 mg AM and 20 mg PM  - Check Lipid profile - Should be on moderate to high intensity statin    #HTN  - continue amlodipine 5 mg daily    #Depression  - Continue Duloxetine 20 mg daily    DVT ppx: Lovenox  GI ppx: Famotidine  DIet: DASH  Dispo: MED 84 yo female with a PMH of HTN, CAD, recurrent UTI presents for confusion and pelvic pain for 2 days.    #Complicated UTI  #Recurrent UTI - Multisensitive E.coli  #Toxic Metabolic Encephalopathy  - UA positive -> f/u urine culture  - On Methenamine for UTI  prophylaxis will hold  - Will start Ceftriaxone 1g  - CT head negative  - Check bladder US  - AVoid opioids, benzos and anticholinergics  - PT consult    #Severe Constipation  - Contributing to TEM and UTI  - Start Lactulose 20 TID and Dulcolax 5 mg BID  - Mineral oil enema  - Would transition to miralax at home after resolution as it has less side effects  - Check TSH    #Hypokalemia  #Decreased Appetite  #Vitamin D deficiency  - Replenish Electrolytes  - Start Multivitamin   - Hold Vitamin D 5000u and check levels as 5000u daily is a high dose which may be contributing to constipation    #CAD  #HFpEF  #HLD  - ECG with PAC; less likely AF -> Repeat tomorrow and tele monitoring  - Continue ASA and Metoprolol  - COntinue LAsix 40 mg AM and 20 mg PM  - Check Lipid profile - Should be on moderate to high intensity statin    #HTN  - continue amlodipine 5 mg daily    #Depression  - Continue Duloxetine 20 mg daily    DVT ppx: Lovenox  GI ppx: Famotidine  DIet: DASH  Dispo: MED

## 2024-03-10 NOTE — ED ADULT NURSE NOTE - NS TRANSFER PATIENT BELONGINGS
PATIENT VERIFIED BY DATE OF BIRTH AND NAME. Patient has been consented for this telecommunication visit. Pt returns for evaluation of abnormal MRI. After last visit, she had US 6/2020 that showed “diffuse hepatic steatosis” but was otherwise normal, GI-wise there was incidental note of hypoechoic right renal lesion, for which CT/MRI was recommended. She then underwent MRI 7/2020, and this showed hepatic steatosis and "a few" 4mm lesions in pancreatic body/tail suspicious for IPMN (side branch) no other pancreatic parenchymal or ductal abnormalities.She generally has been symptom-free. No significant abdominal pain, nausea, early satiety, or weight loss. Minimal lower abdominal discomfort she has had chronically, but unchanged no specific RUQ, epigastric, or periumbilical pain. No prior episodes of pancreatitis. No significant alcohol consumption. No family family history of pancreatitis or unexplained abdominal pain no cystic fibrosis. Liver enzymes from both 3/2020 and 5/2020 are normal.Colonoscopy 5/2019 notable for 3 adenomatous polyps and internal hemorrhoids, though was otherwise unremarkable. Colonoscopy 2014 was generally unremarkable. EGD 3/2014 showed mild esophagitis at [facility] but was otherwise unremarkable (as were duodenal biopsies).10-point ROS completed with patient, pertinent positives/negatives outlined above. Clothing

## 2024-03-10 NOTE — H&P ADULT - HISTORY OF PRESENT ILLNESS
84 yo female with a PMH of HTN, CAD recurrent UTI, MDD, presents for confusion and pelvic pain for 2 days. pt accompanied by daughter who states pt was not herself yesterday and this morning mentions to have pelvic pain. denies any other symptoms including fevers, chill, headache, recent illness/travel, cough, abdominal pain, chest pain, or SOB.    Patient was hemodynamically stable in the ED  Labs with 7K wbc, Potassium 3.4; Trop 20-> 19  UA positive  CTH negative for acute pathologies    Patient admitted for complicated UTI     82 yo female with a PMH of HTN, CAD recurrent UTI, MDD, poliomyelitis with sequelae bedbound, presents for confusion and pelvic pain for 2 days. pt accompanied by daughter who states pt was not herself yesterday and this morning mentions to have pelvic pain. denies any other symptoms including fevers, chill, headache, recent illness/travel, cough, abdominal pain, chest pain, or SOB.  The daughter endorses that the Pt constipation has been getting worse recently.    Patient was hemodynamically stable in the ED  Labs with 7K wbc, Potassium 3.4; Trop 20-> 19  UA positive  CTH negative for acute pathologies    Patient admitted for complicated UTI

## 2024-03-10 NOTE — ED PROVIDER NOTE - PHYSICAL EXAMINATION
Gen: NAD  Head: NCAT  HEENT: PERRL, oral mucosa moist, normal conjunctiva, oropharynx clear without exudate or erythema  Lung: CTAB, no respiratory distress, no wheezing, rales, rhonchi  CV: normal s1/s2, rrr, Normal perfusion, pulses 2+ throughout  Abd: soft, NTND, no CVA tenderness  Genitourinary: no pelvic tenderness  MSK: No edema, no visible deformities, full range of motion in all 4 extremities  Neuro: CN II-XII grossly intact, No focal neurologic deficits, sensation intact, strength 5/5 BUE/BLE  Skin: No rash   Psych: normal affect

## 2024-03-10 NOTE — ED ADULT NURSE NOTE - NSFALLFACTORS_ED_ALL_ED
Altered elimination/Confusion/AMS/Disorientation/Impaired gait/IV and/or equipment tethered to patient/Medication side effects/Weakness

## 2024-03-10 NOTE — PATIENT PROFILE ADULT - FALL HARM RISK - RISK INTERVENTIONS
Assistance OOB with selected safe patient handling equipment/Assistance with ambulation/Communicate Fall Risk and Risk Factors to all staff, patient, and family/Discuss with provider need for PT consult/Monitor gait and stability/Reinforce activity limits and safety measures with patient and family/Visual Cue: Yellow wristband/Bed in lowest position, wheels locked, appropriate side rails in place/Call bell, personal items and telephone in reach/Instruct patient to call for assistance before getting out of bed or chair/Non-slip footwear when patient is out of bed/[place] to call system/Physically safe environment - no spills, clutter or unnecessary equipment/Purposeful Proactive Rounding/Room/bathroom lighting operational, light cord in reach

## 2024-03-10 NOTE — ED ADULT NURSE NOTE - NSICDXPASTMEDICALHX_GEN_ALL_CORE_FT
No-Patient/Caregiver offered and refused free interpretation services.
PAST MEDICAL HISTORY:  CAD (coronary artery disease)     Hypertension     MDD (major depressive disorder)     Post poliomyelitis syndrome

## 2024-03-10 NOTE — ED PROVIDER NOTE - OBJECTIVE STATEMENT
84 yo female with a pmh of htn, cad, recurrent uti presents for confusion and pelvic pain for 2 days. pt accompanied by daughter who states pt was not herself yesterday and this morning mentions to have pelvic pain. denies any other symptoms including fevers, chill, headache, recent illness/travel, cough, abdominal pain, chest pain, or SOB.

## 2024-03-10 NOTE — ED ADULT TRIAGE NOTE - CHIEF COMPLAINT QUOTE
hx of UTI, confusion x 24 hours, warm to touch. family believes she has UTI currently. hx of UTI, confusion x 24 hours. family believes she has UTI currently.

## 2024-03-10 NOTE — H&P ADULT - NSHPPHYSICALEXAM_GEN_ALL_CORE
Head: NCAT  	HEENT: PERRL, oral mucosa moist, normal conjunctiva, oropharynx clear without exudate or erythema  	Lung: CTAB, no respiratory distress, no wheezing, rales, rhonchi  	CV: normal s1/s2, rrr, Normal perfusion, pulses 2+ throughout  	Abd: soft, NTND, no CVA tenderness  	Genitourinary: no pelvic tenderness  	MSK: No edema, no visible deformities, full range of motion in all 4 extremities  	Neuro: CN II-XII grossly intact, No focal neurologic deficits, sensation intact, strength 5/5 BUE/BLE  	Skin: No rash   Psych: normal

## 2024-03-10 NOTE — PATIENT PROFILE ADULT - FUNCTIONAL ASSESSMENT - BASIC MOBILITY 6.
1-calculated by average/Not able to assess (calculate score using [facility] averaging method)

## 2024-03-10 NOTE — H&P ADULT - ATTENDING COMMENTS
HPI:  84 yo female with a PMH of HTN, CAD recurrent UTI, MDD, poliomyelitis with sequelae bedbound, presents for confusion and pelvic pain for 2 days. pt accompanied by daughter who states pt was not herself yesterday and this morning mentions to have pelvic pain. denies any other symptoms including fevers, chill, headache, recent illness/travel, cough, abdominal pain, chest pain, or SOB.  The daughter endorses that the Pt constipation has been getting worse recently.    Patient was hemodynamically stable in the ED  Labs with 7K wbc, Potassium 3.4; Trop 20-> 19  UA positive  CTH negative for acute pathologies    Patient admitted for complicated UTI                          12.2   7.01  )-----------( 172      ( 10 Mar 2024 13:13 )             36.7     03-10    136  |  98  |  8<L>  ----------------------------<  93  3.4<L>   |  27  |  0.6<L>    Ca    9.6      10 Mar 2024 13:13    TPro  6.2  /  Alb  3.7  /  TBili  0.9  /  DBili  x   /  AST  11  /  ALT  6   /  AlkPhos  68  03-10     (10 Mar 2024 16:46)    REVIEW OF SYSTEMS: see cc/HPI   CONSTITUTIONAL: No weakness, fevers or chills  EYES/ENT: No visual changes;  No vertigo or throat pain   NECK: No pain or stiffness  RESPIRATORY: No cough, wheezing, hemoptysis; No shortness of breath  CARDIOVASCULAR: No chest pain or palpitations  GASTROINTESTINAL: No abdominal or epigastric pain. No nausea, vomiting, or hematemesis; No diarrhea or constipation. No melena or hematochezia.  GENITOURINARY: No dysuria, frequency or hematuria  NEUROLOGICAL: No numbness or weakness  SKIN: No itching, rashes    Physical Exam:  General: WN/WD NAD  Neurology: A&Ox3, nonfocal, follows commands  Eyes: PERRLA/ EOMI  ENT/Neck: Neck supple, trachea midline, No JVD  Respiratory: CTA B/L, No wheezing, rales, rhonchi  CV: Normal rate regular rhythm, S1S2, no murmurs, rubs or gallops  Abdominal: Soft, NT, ND +BS,   Extremities: No edema, + peripheral pulses  Skin: No Rashes, Hematoma, Ecchymosis    A/p  Recurrent UTI   Toxic metabolic encephalopathy     CAD  HTN    Hypokalemia - mild   -oral supplementation and repeat BMP in AM     MDD   -c/w Duloxetine     ?? Functional paraplegia / bedbound HPI:  82 yo female with a PMH of HTN, CAD recurrent UTI, MDD, poliomyelitis with sequelae bedbound, presents for confusion and pelvic pain for 2 days. pt accompanied by daughter who states pt was not herself yesterday and this morning mentions to have pelvic pain. denies any other symptoms including fevers, chill, headache, recent illness/travel, cough, abdominal pain, chest pain, or SOB.  The daughter endorses that the Pt constipation has been getting worse recently.    Patient was hemodynamically stable in the ED  Labs with 7K wbc, Potassium 3.4; Trop 20-> 19  UA positive  CTH negative for acute pathologies    Patient admitted for complicated UTI                          12.2   7.01  )-----------( 172      ( 10 Mar 2024 13:13 )             36.7     03-10    136  |  98  |  8<L>  ----------------------------<  93  3.4<L>   |  27  |  0.6<L>    Ca    9.6      10 Mar 2024 13:13    TPro  6.2  /  Alb  3.7  /  TBili  0.9  /  DBili  x   /  AST  11  /  ALT  6   /  AlkPhos  68  03-10     (10 Mar 2024 16:46)    REVIEW OF SYSTEMS: see cc/HPI   CONSTITUTIONAL: No weakness, fevers or chills  EYES/ENT: No visual changes;  No vertigo or throat pain   NECK: No pain or stiffness  RESPIRATORY: No cough, wheezing, hemoptysis; No shortness of breath  CARDIOVASCULAR: No chest pain or palpitations  GASTROINTESTINAL: No abdominal or epigastric pain. No nausea, vomiting, or hematemesis; No diarrhea or constipation. No melena or hematochezia.  GENITOURINARY: No dysuria, frequency or hematuria  NEUROLOGICAL: No numbness or weakness  SKIN: No itching, rashes    Physical Exam:  General: WN/WD NAD  Neurology: A&Ox3, nonfocal, follows commands  Eyes: PERRLA/ EOMI  ENT/Neck: Neck supple, trachea midline, No JVD  Respiratory: CTA B/L, No wheezing, rales, rhonchi  CV: Normal rate regular rhythm, S1S2, no murmurs, rubs or gallops  Abdominal: Soft, NT, ND +BS,   Extremities: (+) Trace pedal edema, + peripheral pulses  Skin: No Rashes, Hematoma, Ecchymosis    A/p  Recurrent UTI   Toxic metabolic encephalopathy  -IV abx   -check blood Cx and Ucx    Chronic constipation   -agree w/ Lactulose and Ducolax   -agree w/ TSH      H/o CAD  H/o HFpEF   HTN  -c/w current OP Rx     Hypokalemia - mild   -oral supplementation and repeat BMP in AM     MDD   -c/w Duloxetine     ??Bedbound/ deconditioning and h/o Polio   -PT rehab     DVT prophylaxis

## 2024-03-10 NOTE — ED PROVIDER NOTE - CLINICAL SUMMARY MEDICAL DECISION MAKING FREE TEXT BOX
Patient presented with worsening confusion and altered mental status over the past few days.  Also complaining of pelvic pain.  On arrival, patient afebrile, hemodynamically stable, nontender on abdominal exam.  Obtained labs which were grossly unremarkable including no significant leukocytosis, anemia, signs of dehydration/HERMANN, transaminitis or significant electrolyte abnormalities.  CT of the head negative for emergent intracranial pathologies.  UA showed very likely UTI and therefore culture sent.  Patient remained stable during ED course, however given the above, patient will require IV antibiotics and admission for further management. Hemodynamically stable for admission.

## 2024-03-10 NOTE — ED PROVIDER NOTE - DIFFERENTIAL DIAGNOSIS
AMS, pelvic pain, likely UTI but will r/o electrolyte abnormality vs intracranial pathology vs dehydration vs anemia Differential Diagnosis

## 2024-03-11 ENCOUNTER — TRANSCRIPTION ENCOUNTER (OUTPATIENT)
Age: 84
End: 2024-03-11

## 2024-03-11 LAB
24R-OH-CALCIDIOL SERPL-MCNC: 45 NG/ML — SIGNIFICANT CHANGE UP (ref 30–80)
A1C WITH ESTIMATED AVERAGE GLUCOSE RESULT: 5.5 % — SIGNIFICANT CHANGE UP (ref 4–5.6)
ALBUMIN SERPL ELPH-MCNC: 3.7 G/DL — SIGNIFICANT CHANGE UP (ref 3.5–5.2)
ALP SERPL-CCNC: 64 U/L — SIGNIFICANT CHANGE UP (ref 30–115)
ALT FLD-CCNC: 5 U/L — SIGNIFICANT CHANGE UP (ref 0–41)
ANION GAP SERPL CALC-SCNC: 12 MMOL/L — SIGNIFICANT CHANGE UP (ref 7–14)
APPEARANCE UR: ABNORMAL
APTT BLD: 31.4 SEC — SIGNIFICANT CHANGE UP (ref 27–39.2)
AST SERPL-CCNC: 11 U/L — SIGNIFICANT CHANGE UP (ref 0–41)
BACTERIA # UR AUTO: ABNORMAL /HPF
BASOPHILS # BLD AUTO: 0.16 K/UL — SIGNIFICANT CHANGE UP (ref 0–0.2)
BASOPHILS NFR BLD AUTO: 2.6 % — HIGH (ref 0–1)
BILIRUB SERPL-MCNC: 0.4 MG/DL — SIGNIFICANT CHANGE UP (ref 0.2–1.2)
BILIRUB UR-MCNC: NEGATIVE — SIGNIFICANT CHANGE UP
BUN SERPL-MCNC: 7 MG/DL — LOW (ref 10–20)
CALCIUM SERPL-MCNC: 9.7 MG/DL — SIGNIFICANT CHANGE UP (ref 8.4–10.5)
CAST: 0 /LPF — SIGNIFICANT CHANGE UP (ref 0–4)
CHLORIDE SERPL-SCNC: 100 MMOL/L — SIGNIFICANT CHANGE UP (ref 98–110)
CHOLEST SERPL-MCNC: 154 MG/DL — SIGNIFICANT CHANGE UP
CO2 SERPL-SCNC: 27 MMOL/L — SIGNIFICANT CHANGE UP (ref 17–32)
COLOR SPEC: YELLOW — SIGNIFICANT CHANGE UP
CREAT SERPL-MCNC: 0.5 MG/DL — LOW (ref 0.7–1.5)
CULTURE RESULTS: SIGNIFICANT CHANGE UP
DIFF PNL FLD: ABNORMAL
EGFR: 92 ML/MIN/1.73M2 — SIGNIFICANT CHANGE UP
EOSINOPHIL # BLD AUTO: 0.43 K/UL — SIGNIFICANT CHANGE UP (ref 0–0.7)
EOSINOPHIL NFR BLD AUTO: 7.1 % — SIGNIFICANT CHANGE UP (ref 0–8)
ESTIMATED AVERAGE GLUCOSE: 111 MG/DL — SIGNIFICANT CHANGE UP (ref 68–114)
GIANT PLATELETS BLD QL SMEAR: PRESENT — SIGNIFICANT CHANGE UP
GLUCOSE SERPL-MCNC: 95 MG/DL — SIGNIFICANT CHANGE UP (ref 70–99)
GLUCOSE UR QL: NEGATIVE MG/DL — SIGNIFICANT CHANGE UP
HCT VFR BLD CALC: 34.6 % — LOW (ref 37–47)
HDLC SERPL-MCNC: 50 MG/DL — LOW
HGB BLD-MCNC: 11.4 G/DL — LOW (ref 12–16)
INR BLD: 1.05 RATIO — SIGNIFICANT CHANGE UP (ref 0.65–1.3)
KETONES UR-MCNC: NEGATIVE MG/DL — SIGNIFICANT CHANGE UP
LEUKOCYTE ESTERASE UR-ACNC: ABNORMAL
LIPID PNL WITH DIRECT LDL SERPL: 88 MG/DL — SIGNIFICANT CHANGE UP
LYMPHOCYTES # BLD AUTO: 1.12 K/UL — LOW (ref 1.2–3.4)
LYMPHOCYTES # BLD AUTO: 18.6 % — LOW (ref 20.5–51.1)
MAGNESIUM SERPL-MCNC: 2.1 MG/DL — SIGNIFICANT CHANGE UP (ref 1.8–2.4)
MANUAL SMEAR VERIFICATION: SIGNIFICANT CHANGE UP
MCHC RBC-ENTMCNC: 29.1 PG — SIGNIFICANT CHANGE UP (ref 27–31)
MCHC RBC-ENTMCNC: 32.9 G/DL — SIGNIFICANT CHANGE UP (ref 32–37)
MCV RBC AUTO: 88.3 FL — SIGNIFICANT CHANGE UP (ref 81–99)
MONOCYTES # BLD AUTO: 0.8 K/UL — HIGH (ref 0.1–0.6)
MONOCYTES NFR BLD AUTO: 13.3 % — HIGH (ref 1.7–9.3)
NEUTROPHILS # BLD AUTO: 3.35 K/UL — SIGNIFICANT CHANGE UP (ref 1.4–6.5)
NEUTROPHILS NFR BLD AUTO: 55.7 % — SIGNIFICANT CHANGE UP (ref 42.2–75.2)
NITRITE UR-MCNC: NEGATIVE — SIGNIFICANT CHANGE UP
NON HDL CHOLESTEROL: 104 MG/DL — SIGNIFICANT CHANGE UP
OVALOCYTES BLD QL SMEAR: SLIGHT — SIGNIFICANT CHANGE UP
PH UR: 7.5 — SIGNIFICANT CHANGE UP (ref 5–8)
PLAT MORPH BLD: NORMAL — SIGNIFICANT CHANGE UP
PLATELET # BLD AUTO: 178 K/UL — SIGNIFICANT CHANGE UP (ref 130–400)
PMV BLD: 10.3 FL — SIGNIFICANT CHANGE UP (ref 7.4–10.4)
POIKILOCYTOSIS BLD QL AUTO: SLIGHT — SIGNIFICANT CHANGE UP
POTASSIUM SERPL-MCNC: 3.7 MMOL/L — SIGNIFICANT CHANGE UP (ref 3.5–5)
POTASSIUM SERPL-SCNC: 3.7 MMOL/L — SIGNIFICANT CHANGE UP (ref 3.5–5)
PROT SERPL-MCNC: 6 G/DL — SIGNIFICANT CHANGE UP (ref 6–8)
PROT UR-MCNC: SIGNIFICANT CHANGE UP MG/DL
PROTHROM AB SERPL-ACNC: 12 SEC — SIGNIFICANT CHANGE UP (ref 9.95–12.87)
RBC # BLD: 3.92 M/UL — LOW (ref 4.2–5.4)
RBC # FLD: 14 % — SIGNIFICANT CHANGE UP (ref 11.5–14.5)
RBC BLD AUTO: ABNORMAL
RBC CASTS # UR COMP ASSIST: 31 /HPF — HIGH (ref 0–4)
SODIUM SERPL-SCNC: 139 MMOL/L — SIGNIFICANT CHANGE UP (ref 135–146)
SP GR SPEC: 1.01 — SIGNIFICANT CHANGE UP (ref 1–1.03)
SPECIMEN SOURCE: SIGNIFICANT CHANGE UP
SQUAMOUS # UR AUTO: 2 /HPF — SIGNIFICANT CHANGE UP (ref 0–5)
T4 FREE+ TSH PNL SERPL: 0.7 UIU/ML — SIGNIFICANT CHANGE UP (ref 0.27–4.2)
TRIGL SERPL-MCNC: 81 MG/DL — SIGNIFICANT CHANGE UP
UROBILINOGEN FLD QL: 1 MG/DL — SIGNIFICANT CHANGE UP (ref 0.2–1)
VARIANT LYMPHS # BLD: 2.7 % — SIGNIFICANT CHANGE UP (ref 0–5)
WBC # BLD: 6.01 K/UL — SIGNIFICANT CHANGE UP (ref 4.8–10.8)
WBC # FLD AUTO: 6.01 K/UL — SIGNIFICANT CHANGE UP (ref 4.8–10.8)
WBC UR QL: 5 /HPF — SIGNIFICANT CHANGE UP (ref 0–5)

## 2024-03-11 PROCEDURE — 99233 SBSQ HOSP IP/OBS HIGH 50: CPT

## 2024-03-11 PROCEDURE — 93010 ELECTROCARDIOGRAM REPORT: CPT | Mod: 76

## 2024-03-11 RX ORDER — CHLORHEXIDINE GLUCONATE 213 G/1000ML
1 SOLUTION TOPICAL
Refills: 0 | Status: DISCONTINUED | OUTPATIENT
Start: 2024-03-11 | End: 2024-03-14

## 2024-03-11 RX ORDER — ENOXAPARIN SODIUM 100 MG/ML
120 INJECTION SUBCUTANEOUS EVERY 12 HOURS
Refills: 0 | Status: DISCONTINUED | OUTPATIENT
Start: 2024-03-11 | End: 2024-03-11

## 2024-03-11 RX ORDER — CEFTRIAXONE 500 MG/1
1000 INJECTION, POWDER, FOR SOLUTION INTRAMUSCULAR; INTRAVENOUS EVERY 24 HOURS
Refills: 0 | Status: DISCONTINUED | OUTPATIENT
Start: 2024-03-11 | End: 2024-03-14

## 2024-03-11 RX ORDER — APIXABAN 2.5 MG/1
5 TABLET, FILM COATED ORAL
Refills: 0 | Status: DISCONTINUED | OUTPATIENT
Start: 2024-03-11 | End: 2024-03-11

## 2024-03-11 RX ORDER — APIXABAN 2.5 MG/1
5 TABLET, FILM COATED ORAL
Refills: 0 | Status: DISCONTINUED | OUTPATIENT
Start: 2024-03-11 | End: 2024-03-14

## 2024-03-11 RX ADMIN — Medication 81 MILLIGRAM(S): at 12:16

## 2024-03-11 RX ADMIN — ENOXAPARIN SODIUM 40 MILLIGRAM(S): 100 INJECTION SUBCUTANEOUS at 05:32

## 2024-03-11 RX ADMIN — Medication 5 MILLIGRAM(S): at 05:33

## 2024-03-11 RX ADMIN — CEFTRIAXONE 100 MILLIGRAM(S): 500 INJECTION, POWDER, FOR SOLUTION INTRAMUSCULAR; INTRAVENOUS at 12:16

## 2024-03-11 RX ADMIN — Medication 1 TABLET(S): at 12:15

## 2024-03-11 RX ADMIN — Medication 20 MILLIGRAM(S): at 21:14

## 2024-03-11 RX ADMIN — LACTULOSE 20 GRAM(S): 10 SOLUTION ORAL at 05:32

## 2024-03-11 RX ADMIN — APIXABAN 5 MILLIGRAM(S): 2.5 TABLET, FILM COATED ORAL at 18:08

## 2024-03-11 RX ADMIN — Medication 50 MILLIGRAM(S): at 05:32

## 2024-03-11 RX ADMIN — AMLODIPINE BESYLATE 5 MILLIGRAM(S): 2.5 TABLET ORAL at 05:33

## 2024-03-11 RX ADMIN — Medication 40 MILLIGRAM(S): at 05:33

## 2024-03-11 RX ADMIN — LACTULOSE 20 GRAM(S): 10 SOLUTION ORAL at 21:15

## 2024-03-11 RX ADMIN — DULOXETINE HYDROCHLORIDE 20 MILLIGRAM(S): 30 CAPSULE, DELAYED RELEASE ORAL at 12:16

## 2024-03-11 NOTE — PROGRESS NOTE ADULT - ASSESSMENT
82 yo female with a PMH of HTN, CAD, recurrent UTI presents for confusion and pelvic pain for 2 days.    #Complicated UTI  #Recurrent UTI - Multisensitive E.coli  #Toxic Metabolic Encephalopathy  - UA positive -> f/u urine culture  - On Methenamine for UTI  prophylaxis will hold  - Will start Ceftriaxone 1g 3/11/24  - CT head negative  - Check bladder US  - AVoid opioids, benzos and anticholinergics  - PT consult    #Severe Constipation-RESOLVED  - Contributing to TEM and UTI  - Start Lactulose 20 TID and Dulcolax 5 mg BID  - Mineral oil enema  - Would transition to miralax at home after resolution as it has less side effects  - Check TSH    #Hypokalemia  #Decreased Appetite  #Vitamin D deficiency  - Replenish Electrolytes  - Start Multivitamin   - Hold Vitamin D 5000u and check levels as 5000u daily is a high dose which may be contributing to constipation    #New onset of Afib  #CAD  #HFpEF  #HLD  - ECG with PAC; likely AF -> Repeat tomorrow and tele monitoring  - daughter agrees with starting AC  - started on lovenox 120mg bid 3/11/24  - Continue ASA and Metoprolol  - COntinue LAsix 40 mg AM and 20 mg PM  - Check Lipid profile - Should be on moderate to high intensity statin    #HTN  - continue amlodipine 5 mg daily    #Depression  - Continue Duloxetine 20 mg daily    DVT ppx: Lovenox  GI ppx: Famotidine  DIet: DASH  Dispo: MED     82 yo female with a PMH of HTN, CAD, recurrent UTI presents for confusion and pelvic pain for 2 days.    #Complicated UTI  #Recurrent UTI - Multisensitive E.coli  #Toxic Metabolic Encephalopathy  - UA positive -> f/u urine culture  - On Methenamine for UTI  prophylaxis will hold  - Will start Ceftriaxone 1g 3/11/24  - CT head negative  - Check bladder US  - AVoid opioids, benzos and anticholinergics  - PT consult    #Severe Constipation-RESOLVED  - Contributing to TEM and UTI  - Start Lactulose 20 TID and Dulcolax 5 mg BID  - Mineral oil enema  - Would transition to miralax at home after resolution as it has less side effects  - Check TSH    #Hypokalemia  #Decreased Appetite  #Vitamin D deficiency  - Replenish Electrolytes  - Start Multivitamin   - Hold Vitamin D 5000u and check levels as 5000u daily is a high dose which may be contributing to constipation    #New onset of Afib  - HR 64  - EP consulted  - Cardio consulted for possible DCCV   ECG with PAC; likely AF -> Repeat tomorrow and tele monitoring  - daughter agrees with starting AC  - started on Eliquis 5mg bid 3/11/24    #CAD  #HFpEF  #HLD  - Continue ASA and Metoprolol  - COntinue LAsix 40 mg AM and 20 mg PM  - Check Lipid profile - Should be on moderate to high intensity statin    #HTN  - continue amlodipine 5 mg daily    #Depression  - Continue Duloxetine 20 mg daily    DVT ppx: Lovenox  GI ppx: Famotidine  DIet: DASH  Dispo: MED

## 2024-03-11 NOTE — PROGRESS NOTE ADULT - SUBJECTIVE AND OBJECTIVE BOX
HPI:  84 yo female with a PMH of HTN, CAD recurrent UTI, MDD, poliomyelitis with sequelae bedbound, presents for confusion and pelvic pain for 2 days. pt accompanied by daughter who states pt was not herself yesterday and this morning mentions to have pelvic pain. denies any other symptoms including fevers, chill, headache, recent illness/travel, cough, abdominal pain, chest pain, or SOB.  The daughter endorses that the Pt constipation has been getting worse recently.    Patient was hemodynamically stable in the ED  Labs with 7K wbc, Potassium 3.4; Trop 20-> 19  UA positive  CTH negative for acute pathologies    Patient admitted for complicated UTI     (10 Mar 2024 16:46)    Primary diagnosis of Acute UTI    24H events:    Today is hospital day 1d. This morning patient was seen and examined at bedside, resting comfortably in bed.    No acute or major events overnight.    PAST MEDICAL & SURGICAL HISTORY  Hypertension    CAD (coronary artery disease)    MDD (major depressive disorder)    Post poliomyelitis syndrome      SOCIAL HISTORY:  Social History:      ALLERGIES:  penicillin (Rash)  gabapentin (Swelling (Moderate))    MEDICATIONS:  STANDING MEDICATIONS  amLODIPine   Tablet 5 milliGRAM(s) Oral daily  aspirin  chewable 81 milliGRAM(s) Oral daily  bisacodyl 5 milliGRAM(s) Oral every 12 hours  cefTRIAXone   IVPB 1000 milliGRAM(s) IV Intermittent every 24 hours  DULoxetine 20 milliGRAM(s) Oral daily  enoxaparin Injectable 40 milliGRAM(s) SubCutaneous every 24 hours  furosemide    Tablet 20 milliGRAM(s) Oral at bedtime  furosemide    Tablet 40 milliGRAM(s) Oral daily  lactulose Syrup 20 Gram(s) Oral three times a day  metoprolol succinate ER 50 milliGRAM(s) Oral daily  mineral oil enema 133 milliLiter(s) Rectal daily  multivitamin 1 Tablet(s) Oral daily    PRN MEDICATIONS  acetaminophen     Tablet .. 650 milliGRAM(s) Oral every 6 hours PRN  aluminum hydroxide/magnesium hydroxide/simethicone Suspension 30 milliLiter(s) Oral every 4 hours PRN  melatonin 3 milliGRAM(s) Oral at bedtime PRN  ondansetron Injectable 4 milliGRAM(s) IV Push every 8 hours PRN    VITALS:   T(F): 96.9  HR: 65  BP: 159/79  RR: 18  SpO2: 99%    PHYSICAL EXAM:  GENERAL:   ( X ) NAD, lying in bed comfortably     (  ) obtunded     (  ) lethargic     (  ) somnolent    HEAD:   ( X ) Atraumatic     (  ) hematoma     (  ) laceration (specify location:       )     NECK:  ( X ) Supple     (  ) neck stiffness     (  ) nuchal rigidity     (  )  no JVD     (  ) JVD present ( -- cm)    HEART:  Rate -->     ( X ) normal rate     (  ) bradycardic     (  ) tachycardic  Rhythm -->     (  ) regular     (  ) regularly irregular     ( X ) irregularly irregular  Murmurs -->     (  ) normal s1s2     (  ) systolic murmur     (  ) diastolic murmur     (  ) continuous murmur      (  ) S3 present     (  ) S4 present    LUNGS:   ( X )Unlabored respirations     (  ) tachypnea  (  ) B/L air entry     (  ) decreased breath sounds in:  (location     )    (X  ) no adventitious sound     (  ) crackles     (  ) wheezing      (  ) rhonchi      (specify location:       )  (  ) chest wall tenderness (specify location:       )    ABDOMEN:   ( X ) Soft     (  ) tense   |   (  ) nondistended     (  ) distended   |   (  ) +BS     (  ) hypoactive bowel sounds     (  ) hyperactive bowel sounds  ( X ) nontender     (  ) RUQ tenderness     (  ) RLQ tenderness     (  ) LLQ tenderness     (  ) epigastric tenderness     (  ) diffuse tenderness  (  ) Splenomegaly      (  ) Hepatomegaly      (  ) Jaundice     (  ) ecchymosis     EXTREMITIES:  (  ) Normal     (  ) Rash     (  ) ecchymosis     (  ) varicose veins      (  ) pitting edema     (  ) non-pitting edema   (  ) ulceration     (  ) gangrene:     (location:     )    NERVOUS SYSTEM:    ( X ) A&Ox3     (  ) confused     (  ) lethargic  CN II-XII:     (  ) Intact     (  ) deficits found     (Specify:     )   Upper extremities:     (  ) no sensorimotor deficits     (  ) weakness     (  ) loss of proprioception/vibration     (  ) loss of touch/temperature (specify:    )  Lower extremities:     (  ) no sensorimotor deficits     (  ) weakness     (  ) loss of proprioception/vibration     (  ) loss of touch/temperature (specify:    )    SKIN:   (  ) No rashes or lesions     (  ) maculopapular rash     (  ) pustules     (  ) vesicles     ( X ) ulcer     (  ) ecchymosis     (specify location:     )    AMPAC score:    (  ) Indwelling Dawson Catheter:   Date insterted:    Reason (  ) Critical illness     (  ) urinary retention    (  ) Accurate Ins/Outs Monitoring     (  ) CMO patient    (  ) Central Line:   Date inserted:  Location: (  ) Right IJ     (  ) Left IJ     (  ) Right Fem     (  ) Left Fem    (  ) SPC        (  ) pigtail       (  ) PEG tube       (  ) colostomy       (  ) jejunostomy  (  ) U-Dall    LABS:                        11.4   6.01  )-----------( 178      ( 11 Mar 2024 08:52 )             34.6     03-11    139  |  100  |  7<L>  ----------------------------<  95  3.7   |  27  |  0.5<L>    Ca    9.7      11 Mar 2024 08:52  Mg     2.1     03-11    TPro  6.0  /  Alb  3.7  /  TBili  0.4  /  DBili  x   /  AST  11  /  ALT  5   /  AlkPhos  64  03-11    PT/INR - ( 11 Mar 2024 08:52 )   PT: 12.00 sec;   INR: 1.05 ratio         PTT - ( 11 Mar 2024 08:52 )  PTT:31.4 sec  Urinalysis Basic - ( 11 Mar 2024 08:52 )    Color: x / Appearance: x / SG: x / pH: x  Gluc: 95 mg/dL / Ketone: x  / Bili: x / Urobili: x   Blood: x / Protein: x / Nitrite: x   Leuk Esterase: x / RBC: x / WBC x   Sq Epi: x / Non Sq Epi: x / Bacteria: x    RADIOLOGY:

## 2024-03-11 NOTE — OCCUPATIONAL THERAPY INITIAL EVALUATION ADULT - REHAB POTENTIAL, OT EVAL
OT evaluation completed. Pt is at baseline in all areas of function. Pt is not a candidate for skilled OT interventions. Pt is d/c from OT.

## 2024-03-11 NOTE — OCCUPATIONAL THERAPY INITIAL EVALUATION ADULT - ADDITIONAL COMMENTS
As per pt report, resides on 1 level in 3rd floor apartment in elevated building; no steps to enter. Building owns a stair lift, but pt uses elevator only. HHA 16 hours x 7 days. Pt is w/c bound. All transfers are performed with the full body mechanical lift.

## 2024-03-11 NOTE — PROGRESS NOTE ADULT - SUBJECTIVE AND OBJECTIVE BOX
ABBE CROWDER  83y Female    CHIEF COMPLAINT:    Patient is a 83y old  Female who presents with a chief complaint of Urinary Symptoms (10 Mar 2024 16:46)      INTERVAL HPI/OVERNIGHT EVENTS:    Patient seen and examined.    ROS: All other systems are negative.    Vital Signs:    T(F): 96.9 (24 @ 12:40), Max: 100.8 (03-10-24 @ 13:00)  HR: 65 (24 @ 12:40) (51 - 88)  BP: 159/79 (24 @ 12:40) (123/69 - 174/79)  RR: 18 (24 @ 12:40) (17 - 20)  SpO2: 99% (24 @ 05:07) (97% - 99%)  I&O's Summary    Daily Height in cm: 180.34 (10 Mar 2024 18:00)    Daily Weight in k.7 (11 Mar 2024 05:07)  CAPILLARY BLOOD GLUCOSE      POCT Blood Glucose.: 96 mg/dL (10 Mar 2024 13:53)      PHYSICAL EXAM:    GENERAL:  NAD  SKIN: No rashes or lesions  HENT: Atraumatic. Normocephalic. PERRL. Moist membranes.  NECK: Supple, No JVD. No lymphadenopathy.  PULMONARY: CTA B/L. No wheezing. No rales  CVS: Normal S1, S2. Rate and Rhythm are regular. No murmurs.  ABDOMEN/GI: Soft, Nontender, Nondistended; BS present  EXTREMITIES: Peripheral pulses intact. No edema B/L LE.  NEUROLOGIC:  No motor or sensory deficit.  PSYCH: Alert & oriented x 3    Consultant(s) Notes Reviewed:  [x ] YES  [ ] NO  Care Discussed with Consultants/Other Providers [ x] YES  [ ] NO    EKG reviewed  Telemetry reviewed    LABS:                        11.   6.  )-----------( 178      ( 11 Mar 2024 08:52 )             34.6     -    139  |  100  |  7<L>  ----------------------------<  95  3.7   |  27  |  0.5<L>    Ca    9.7      11 Mar 2024 08:52  Mg     2.1     -    TPro  6.0  /  Alb  3.7  /  TBili  0.4  /  DBili  x   /  AST  11  /  ALT  5   /  AlkPhos  64  03-11    PT/INR - ( 11 Mar 2024 08:52 )   PT: 12.00 sec;   INR: 1.05 ratio         PTT - ( 11 Mar 2024 08:52 )  PTT:31.4 sec          RADIOLOGY & ADDITIONAL TESTS:    < from: CT Head No Cont (03.10.24 @ 13:25) >    IMPRESSION:  No evidence of acute transcortical infarct, acute intracranial   hemorrhage, or mass effect.      < end of copied text >  < from: US Kidney and Bladder (03.10.24 @ 20:27) >    IMPRESSION:    Nonvisualization of left kidney. No right hydronephrosis.    Urinary bladder volume of 246 mL. Patient unable to void during   examination.    < end of copied text >    Imaging or report Personally Reviewed:  [x ] YES  [ ] NO    Medications:  Standing  amLODIPine   Tablet 5 milliGRAM(s) Oral daily  aspirin  chewable 81 milliGRAM(s) Oral daily  bisacodyl 5 milliGRAM(s) Oral every 12 hours  cefTRIAXone   IVPB 1000 milliGRAM(s) IV Intermittent every 24 hours  DULoxetine 20 milliGRAM(s) Oral daily  enoxaparin Injectable 40 milliGRAM(s) SubCutaneous every 24 hours  furosemide    Tablet 20 milliGRAM(s) Oral at bedtime  furosemide    Tablet 40 milliGRAM(s) Oral daily  lactulose Syrup 20 Gram(s) Oral three times a day  metoprolol succinate ER 50 milliGRAM(s) Oral daily  mineral oil enema 133 milliLiter(s) Rectal daily  multivitamin 1 Tablet(s) Oral daily    PRN Meds  acetaminophen     Tablet .. 650 milliGRAM(s) Oral every 6 hours PRN  aluminum hydroxide/magnesium hydroxide/simethicone Suspension 30 milliLiter(s) Oral every 4 hours PRN  melatonin 3 milliGRAM(s) Oral at bedtime PRN  ondansetron Injectable 4 milliGRAM(s) IV Push every 8 hours PRN      Case discussed with resident    Care discussed with pt/family           ABBE CROWDER  83y Female    CHIEF COMPLAINT:    Patient is a 83y old  Female who presents with a chief complaint of Urinary Symptoms (10 Mar 2024 16:46)      INTERVAL HPI/OVERNIGHT EVENTS:    Patient seen and examined. C/O burning micturition and feeling cold. Spiking low grade fever. No palpitations.     ROS: All other systems are negative.    Vital Signs:    T(F): 96.9 (24 @ 12:40), Max: 100.8 (03-10-24 @ 13:00)  HR: 65 (24 @ 12:40) (51 - 88)  BP: 159/79 (24 @ 12:40) (123/69 - 174/79)  RR: 18 (24 @ 12:40) (17 - 20)  SpO2: 99% (24 @ 05:07) (97% - 99%)  I&O's Summary    Daily Height in cm: 180.34 (10 Mar 2024 18:00)    Daily Weight in k.7 (11 Mar 2024 05:07)  CAPILLARY BLOOD GLUCOSE      POCT Blood Glucose.: 96 mg/dL (10 Mar 2024 13:53)      PHYSICAL EXAM:    GENERAL:  NAD  SKIN: No rashes or lesions  HENT: Atraumatic. Normocephalic. PERRL. Moist membranes.  NECK: Supple, No JVD. No lymphadenopathy.  PULMONARY: CTA B/L. No wheezing. No rales  CVS: Normal S1, S2. Rate and Rhythm are regular. No murmurs.  ABDOMEN/GI: Soft, Nontender, Nondistended; BS present  EXTREMITIES: Peripheral pulses intact. No edema B/L LE.  NEUROLOGIC:  No motor or sensory deficit.  PSYCH: Alert & oriented x 3    Consultant(s) Notes Reviewed:  [x ] YES  [ ] NO  Care Discussed with Consultants/Other Providers [ x] YES  [ ] NO    EKG reviewed  Telemetry reviewed    LABS:                        11.4   6.01  )-----------( 178      ( 11 Mar 2024 08:52 )             34.6     -    139  |  100  |  7<L>  ----------------------------<  95  3.7   |  27  |  0.5<L>    Ca    9.7      11 Mar 2024 08:52  Mg     2.1     03-11    TPro  6.0  /  Alb  3.7  /  TBili  0.4  /  DBili  x   /  AST  11  /  ALT  5   /  AlkPhos  64  03-11    PT/INR - ( 11 Mar 2024 08:52 )   PT: 12.00 sec;   INR: 1.05 ratio         PTT - ( 11 Mar 2024 08:52 )  PTT:31.4 sec          RADIOLOGY & ADDITIONAL TESTS:    < from: CT Head No Cont (03.10.24 @ 13:25) >    IMPRESSION:  No evidence of acute transcortical infarct, acute intracranial   hemorrhage, or mass effect.      < end of copied text >  < from: US Kidney and Bladder (03.10.24 @ 20:27) >    IMPRESSION:    Nonvisualization of left kidney. No right hydronephrosis.    Urinary bladder volume of 246 mL. Patient unable to void during   examination.    < end of copied text >    Imaging or report Personally Reviewed:  [x ] YES  [ ] NO    Medications:  Standing  amLODIPine   Tablet 5 milliGRAM(s) Oral daily  aspirin  chewable 81 milliGRAM(s) Oral daily  bisacodyl 5 milliGRAM(s) Oral every 12 hours  cefTRIAXone   IVPB 1000 milliGRAM(s) IV Intermittent every 24 hours  DULoxetine 20 milliGRAM(s) Oral daily  enoxaparin Injectable 40 milliGRAM(s) SubCutaneous every 24 hours  furosemide    Tablet 20 milliGRAM(s) Oral at bedtime  furosemide    Tablet 40 milliGRAM(s) Oral daily  lactulose Syrup 20 Gram(s) Oral three times a day  metoprolol succinate ER 50 milliGRAM(s) Oral daily  mineral oil enema 133 milliLiter(s) Rectal daily  multivitamin 1 Tablet(s) Oral daily    PRN Meds  acetaminophen     Tablet .. 650 milliGRAM(s) Oral every 6 hours PRN  aluminum hydroxide/magnesium hydroxide/simethicone Suspension 30 milliLiter(s) Oral every 4 hours PRN  melatonin 3 milliGRAM(s) Oral at bedtime PRN  ondansetron Injectable 4 milliGRAM(s) IV Push every 8 hours PRN      Case discussed with resident    Care discussed with pt/family           ABBE CROWDER  83y Female    CHIEF COMPLAINT:    Patient is a 83y old  Female who presents with a chief complaint of Urinary Symptoms (10 Mar 2024 16:46)      INTERVAL HPI/OVERNIGHT EVENTS:    Patient seen and examined. C/O burning micturition and feeling cold. Spiking low grade fever. No palpitations.     ROS: All other systems are negative.    Vital Signs:    T(F): 96.9 (24 @ 12:40), Max: 100.8 (03-10-24 @ 13:00)  HR: 65 (24 @ 12:40) (51 - 88)  BP: 159/79 (24 @ 12:40) (123/69 - 174/79)  RR: 18 (24 @ 12:40) (17 - 20)  SpO2: 99% (24 @ 05:07) (97% - 99%)  I&O's Summary    Daily Height in cm: 180.34 (10 Mar 2024 18:00)    Daily Weight in k.7 (11 Mar 2024 05:07)  CAPILLARY BLOOD GLUCOSE      POCT Blood Glucose.: 96 mg/dL (10 Mar 2024 13:53)      PHYSICAL EXAM:    GENERAL:  NAD  SKIN: No rashes or lesions  HENT: Atraumatic. Normocephalic. PERRL. Moist membranes.  NECK: Supple, No JVD. No lymphadenopathy.  PULMONARY: CTA B/L. No wheezing. No rales  CVS: Normal S1, S2. Rate and Rhythm are IRIR. No murmurs.  ABDOMEN/GI: Soft, Nontender, Nondistended; BS present  EXTREMITIES: Peripheral pulses intact. No edema B/L LE.  NEUROLOGIC:  Bedbound due to Polio.   PSYCH: Alert & oriented x 3    Consultant(s) Notes Reviewed:  [x ] YES  [ ] NO  Care Discussed with Consultants/Other Providers [ x] YES  [ ] NO    EKG reviewed  Telemetry reviewed    LABS:                        11.4   6.01  )-----------( 178      ( 11 Mar 2024 08:52 )             34.6         139  |  100  |  7<L>  ----------------------------<  95  3.7   |  27  |  0.5<L>    Ca    9.7      11 Mar 2024 08:52  Mg     2.1     03-    TPro  6.0  /  Alb  3.7  /  TBili  0.4  /  DBili  x   /  AST  11  /  ALT  5   /  AlkPhos  64  03-11    PT/INR - ( 11 Mar 2024 08:52 )   PT: 12.00 sec;   INR: 1.05 ratio         PTT - ( 11 Mar 2024 08:52 )  PTT:31.4 sec          RADIOLOGY & ADDITIONAL TESTS:    < from: CT Head No Cont (03.10.24 @ 13:25) >    IMPRESSION:  No evidence of acute transcortical infarct, acute intracranial   hemorrhage, or mass effect.      < end of copied text >  < from: US Kidney and Bladder (03.10.24 @ 20:27) >    IMPRESSION:    Nonvisualization of left kidney. No right hydronephrosis.    Urinary bladder volume of 246 mL. Patient unable to void during   examination.    < end of copied text >    Imaging or report Personally Reviewed:  [x ] YES  [ ] NO    Medications:  Standing  amLODIPine   Tablet 5 milliGRAM(s) Oral daily  aspirin  chewable 81 milliGRAM(s) Oral daily  bisacodyl 5 milliGRAM(s) Oral every 12 hours  cefTRIAXone   IVPB 1000 milliGRAM(s) IV Intermittent every 24 hours  DULoxetine 20 milliGRAM(s) Oral daily  enoxaparin Injectable 40 milliGRAM(s) SubCutaneous every 24 hours  furosemide    Tablet 20 milliGRAM(s) Oral at bedtime  furosemide    Tablet 40 milliGRAM(s) Oral daily  lactulose Syrup 20 Gram(s) Oral three times a day  metoprolol succinate ER 50 milliGRAM(s) Oral daily  mineral oil enema 133 milliLiter(s) Rectal daily  multivitamin 1 Tablet(s) Oral daily    PRN Meds  acetaminophen     Tablet .. 650 milliGRAM(s) Oral every 6 hours PRN  aluminum hydroxide/magnesium hydroxide/simethicone Suspension 30 milliLiter(s) Oral every 4 hours PRN  melatonin 3 milliGRAM(s) Oral at bedtime PRN  ondansetron Injectable 4 milliGRAM(s) IV Push every 8 hours PRN      Case discussed with resident    Care discussed with pt/family

## 2024-03-11 NOTE — OCCUPATIONAL THERAPY INITIAL EVALUATION ADULT - PERTINENT HX OF CURRENT PROBLEM, REHAB EVAL
84 yo female with a PMH of HTN, CAD recurrent UTI, MDD, poliomyelitis with sequelae bedbound, presents for confusion and pelvic pain for 2 days. pt accompanied by daughter who states pt was not herself yesterday and this morning mentions to have pelvic pain. denies any other symptoms including fevers, chill, headache, recent illness/travel, cough, abdominal pain, chest pain, or SOB.  The daughter endorses that the Pt constipation has been getting worse recently.    Patient was hemodynamically stable in the ED  Labs with 7K wbc, Potassium 3.4; Trop 20-> 19  UA positive  CTH negative for acute pathologies    Patient admitted for complicated UTI

## 2024-03-11 NOTE — OCCUPATIONAL THERAPY INITIAL EVALUATION ADULT - RANGE OF MOTION EXAMINATION
AROM: B/L shoulders 0-90*, elbow flexion WFL, elbow ext : R UE: -90*, L UE: -20*; distal WFL./deficits as listed below

## 2024-03-11 NOTE — PHYSICAL THERAPY INITIAL EVALUATION ADULT - TINETTI BALANCE TEST, REHAB EVAL
Head, normocephalic, atraumatic, Face, Face within normal limits, Ears, External ears within normal limits 0/28. HIGH risk for falls.

## 2024-03-11 NOTE — PROGRESS NOTE ADULT - ASSESSMENT
84 yo female with a PMH of HTN, CAD recurrent UTI, MDD, poliomyelitis with sequelae bedbound, presents for confusion and pelvic pain for 2 days.    Toxic/metabolic encephalopathy  Complicated UTI  CAD / HTN  H/O Poliomyelitis  Bedbound                PLAN:    ·	Tele reviewed.   ·	EKG: A-fib 64/min. Non specific T wave changes.  (interpreted by me)  ·	 84 yo female with a PMH of HTN, CAD recurrent UTI, MDD, poliomyelitis with sequelae bedbound, presents for confusion and pelvic pain for 2 days.    Toxic/metabolic encephalopathy  Complicated UTI  New onset A-Fib  CAD / HTN  H/O Poliomyelitis  Bedbound                PLAN:    ·	Tele reviewed.   ·	EKG: A-fib 64/min. Non specific T wave changes.  (interpreted by me). No h/o A-fib in the past  ·	[id number]-Sytt score is 4. D/W the daughter. Explained her about the risks and benefits of A/C. Agrees for A/C 82 yo female with a PMH of HTN, CAD recurrent UTI, MDD, poliomyelitis with sequelae bedbound, presents for confusion and pelvic pain for 2 days.    Toxic/metabolic encephalopathy  Complicated UTI  New onset A-Fib  CAD / HTN  H/O Poliomyelitis  Bedbound                PLAN:    ·	Tele reviewed. A-fib rate controlled  ·	Cardiology eval for possible DCCV  ·	ECHO  ·	EKG: A-fib 64/min. Non specific T wave changes.  (interpreted by me). No h/o A-fib in the past  ·	[id number]-Dxrh score is 4. D/W the daughter. Explained her about the risks and benefits of A/C. Agrees for A/C. Start her on Eliquis 5 mg po q 12h  ·	U/A noted. Grossly positive. Urine cx is contaminated.   ·	Repeat U/A and urine cx  ·	D/C Aztreonam and switch her to Rocephin 1 gm IV daily  ·	Renal bladder US noted. Nonvisualization of left kidney. No right hydronephrosis. PVR is 246. Monitor PVR.   ·	Cont her other home meds  ·	Called pt's daughter and discussed plan of care with her    Progress Note Handoff    Pending (specify):  Consults__Cardiology_______, Tests_ECHO______, Test Results_______, Other_________  Family discussion:  Disposition: Home___/SNF___/Other________/Unknown at this time________    Obed Hinds MD  Spectra: [phone]

## 2024-03-11 NOTE — PHYSICAL THERAPY INITIAL EVALUATION ADULT - PERTINENT HX OF CURRENT PROBLEM, REHAB EVAL
Hx of poliomyelitis with sequelae (baseline is bedbound). Coco lift is used to transfer patient from bed to chair and back. Patient requesting to remain on program to maintain bed mobility function.

## 2024-03-11 NOTE — DISCHARGE NOTE NURSING/CASE MANAGEMENT/SOCIAL WORK - NSDCPEFALRISK_GEN_ALL_CORE
For information on Fall & Injury Prevention, visit: [email or web address] OR  [email or web address] OR  https://www.cdc.gov/steadi/patient.html

## 2024-03-11 NOTE — PHYSICAL THERAPY INITIAL EVALUATION ADULT - GENERAL OBSERVATIONS, REHAB EVAL
Hx of poliomyelitis with sequelae (baseline is bedbound). Patient left in supine in NAD. +call bell, +bed alarm, +telemetry, +IV, +rueda, +private aide Sheela present to report patient's baseline function.

## 2024-03-11 NOTE — DISCHARGE NOTE NURSING/CASE MANAGEMENT/SOCIAL WORK - PATIENT PORTAL LINK FT
You can access the FollowMyHealth Patient Portal offered by [facility] by registering at the following website: [email or web address]. By joining [facility] [employer]’s FollowMyHealth portal, you will also be able to view your health information using other applications (apps) compatible with our system.

## 2024-03-11 NOTE — DISCHARGE NOTE NURSING/CASE MANAGEMENT/SOCIAL WORK - FLU SEASON?
Yes... Azithromycin Pregnancy And Lactation Text: This medication is considered safe during pregnancy and is also secreted in breast milk.

## 2024-03-11 NOTE — OCCUPATIONAL THERAPY INITIAL EVALUATION ADULT - LEVEL OF INDEPENDENCE: SIT/STAND, REHAB EVAL
Unsafe due to impaired sitting balance/strength/AROM At baseline, uses full body mechanical lift./unable to perform

## 2024-03-11 NOTE — OCCUPATIONAL THERAPY INITIAL EVALUATION ADULT - NSOTDISCHREC_GEN_A_CORE
Patient requires max assistance/dependent with activities of daily living. FUll body mechanical lift for transfers. OT recommends D/C to home with assistance or LTC when medically appropriate. Pt owns necessary DME for safe D/C to home. Refer to evaluation for details.

## 2024-03-12 ENCOUNTER — RESULT REVIEW (OUTPATIENT)
Age: 84
End: 2024-03-12

## 2024-03-12 LAB
ALBUMIN SERPL ELPH-MCNC: 3.4 G/DL — LOW (ref 3.5–5.2)
ALP SERPL-CCNC: 62 U/L — SIGNIFICANT CHANGE UP (ref 30–115)
ALT FLD-CCNC: 6 U/L — SIGNIFICANT CHANGE UP (ref 0–41)
ANION GAP SERPL CALC-SCNC: 9 MMOL/L — SIGNIFICANT CHANGE UP (ref 7–14)
AST SERPL-CCNC: 12 U/L — SIGNIFICANT CHANGE UP (ref 0–41)
BASOPHILS # BLD AUTO: 0.04 K/UL — SIGNIFICANT CHANGE UP (ref 0–0.2)
BASOPHILS NFR BLD AUTO: 0.7 % — SIGNIFICANT CHANGE UP (ref 0–1)
BILIRUB SERPL-MCNC: 0.4 MG/DL — SIGNIFICANT CHANGE UP (ref 0.2–1.2)
BUN SERPL-MCNC: 5 MG/DL — LOW (ref 10–20)
CALCIUM SERPL-MCNC: 9.7 MG/DL — SIGNIFICANT CHANGE UP (ref 8.4–10.5)
CHLORIDE SERPL-SCNC: 101 MMOL/L — SIGNIFICANT CHANGE UP (ref 98–110)
CO2 SERPL-SCNC: 27 MMOL/L — SIGNIFICANT CHANGE UP (ref 17–32)
CREAT SERPL-MCNC: 0.5 MG/DL — LOW (ref 0.7–1.5)
CULTURE RESULTS: SIGNIFICANT CHANGE UP
EGFR: 92 ML/MIN/1.73M2 — SIGNIFICANT CHANGE UP
EOSINOPHIL # BLD AUTO: 0.27 K/UL — SIGNIFICANT CHANGE UP (ref 0–0.7)
EOSINOPHIL NFR BLD AUTO: 4.5 % — SIGNIFICANT CHANGE UP (ref 0–8)
GLUCOSE SERPL-MCNC: 101 MG/DL — HIGH (ref 70–99)
HCT VFR BLD CALC: 34.3 % — LOW (ref 37–47)
HGB BLD-MCNC: 11.3 G/DL — LOW (ref 12–16)
IMM GRANULOCYTES NFR BLD AUTO: 0.3 % — SIGNIFICANT CHANGE UP (ref 0.1–0.3)
LYMPHOCYTES # BLD AUTO: 1.61 K/UL — SIGNIFICANT CHANGE UP (ref 1.2–3.4)
LYMPHOCYTES # BLD AUTO: 26.6 % — SIGNIFICANT CHANGE UP (ref 20.5–51.1)
MAGNESIUM SERPL-MCNC: 1.9 MG/DL — SIGNIFICANT CHANGE UP (ref 1.8–2.4)
MCHC RBC-ENTMCNC: 29.2 PG — SIGNIFICANT CHANGE UP (ref 27–31)
MCHC RBC-ENTMCNC: 32.9 G/DL — SIGNIFICANT CHANGE UP (ref 32–37)
MCV RBC AUTO: 88.6 FL — SIGNIFICANT CHANGE UP (ref 81–99)
MONOCYTES # BLD AUTO: 0.94 K/UL — HIGH (ref 0.1–0.6)
MONOCYTES NFR BLD AUTO: 15.5 % — HIGH (ref 1.7–9.3)
NEUTROPHILS # BLD AUTO: 3.18 K/UL — SIGNIFICANT CHANGE UP (ref 1.4–6.5)
NEUTROPHILS NFR BLD AUTO: 52.4 % — SIGNIFICANT CHANGE UP (ref 42.2–75.2)
NRBC # BLD: 0 /100 WBCS — SIGNIFICANT CHANGE UP (ref 0–0)
PHOSPHATE SERPL-MCNC: 2.7 MG/DL — SIGNIFICANT CHANGE UP (ref 2.1–4.9)
PLATELET # BLD AUTO: 181 K/UL — SIGNIFICANT CHANGE UP (ref 130–400)
PMV BLD: 10 FL — SIGNIFICANT CHANGE UP (ref 7.4–10.4)
POTASSIUM SERPL-MCNC: 3.5 MMOL/L — SIGNIFICANT CHANGE UP (ref 3.5–5)
POTASSIUM SERPL-SCNC: 3.5 MMOL/L — SIGNIFICANT CHANGE UP (ref 3.5–5)
PROT SERPL-MCNC: 6 G/DL — SIGNIFICANT CHANGE UP (ref 6–8)
RBC # BLD: 3.87 M/UL — LOW (ref 4.2–5.4)
RBC # FLD: 13.4 % — SIGNIFICANT CHANGE UP (ref 11.5–14.5)
SODIUM SERPL-SCNC: 137 MMOL/L — SIGNIFICANT CHANGE UP (ref 135–146)
SPECIMEN SOURCE: SIGNIFICANT CHANGE UP
WBC # BLD: 6.06 K/UL — SIGNIFICANT CHANGE UP (ref 4.8–10.8)
WBC # FLD AUTO: 6.06 K/UL — SIGNIFICANT CHANGE UP (ref 4.8–10.8)

## 2024-03-12 PROCEDURE — 93306 TTE W/DOPPLER COMPLETE: CPT | Mod: 26

## 2024-03-12 RX ADMIN — AMLODIPINE BESYLATE 5 MILLIGRAM(S): 2.5 TABLET ORAL at 05:16

## 2024-03-12 RX ADMIN — CHLORHEXIDINE GLUCONATE 1 APPLICATION(S): 213 SOLUTION TOPICAL at 05:19

## 2024-03-12 RX ADMIN — APIXABAN 5 MILLIGRAM(S): 2.5 TABLET, FILM COATED ORAL at 17:30

## 2024-03-12 RX ADMIN — Medication 50 MILLIGRAM(S): at 05:16

## 2024-03-12 RX ADMIN — DULOXETINE HYDROCHLORIDE 20 MILLIGRAM(S): 30 CAPSULE, DELAYED RELEASE ORAL at 14:19

## 2024-03-12 RX ADMIN — LACTULOSE 20 GRAM(S): 10 SOLUTION ORAL at 22:20

## 2024-03-12 RX ADMIN — Medication 40 MILLIGRAM(S): at 05:16

## 2024-03-12 RX ADMIN — Medication 20 MILLIGRAM(S): at 22:19

## 2024-03-12 RX ADMIN — APIXABAN 5 MILLIGRAM(S): 2.5 TABLET, FILM COATED ORAL at 05:16

## 2024-03-12 RX ADMIN — CEFTRIAXONE 100 MILLIGRAM(S): 500 INJECTION, POWDER, FOR SOLUTION INTRAMUSCULAR; INTRAVENOUS at 14:19

## 2024-03-12 RX ADMIN — Medication 81 MILLIGRAM(S): at 14:19

## 2024-03-12 RX ADMIN — Medication 1 TABLET(S): at 14:19

## 2024-03-12 RX ADMIN — LACTULOSE 20 GRAM(S): 10 SOLUTION ORAL at 05:19

## 2024-03-12 NOTE — PROGRESS NOTE ADULT - ATTENDING COMMENTS
***My note supersedes any discrepancies that may be above in the note above***    84 yo female with a PMH of HTN, CAD, recurrent UTI presents for confusion and pelvic pain for 2 days.    #Complicated UTI  #Recurrent UTI - Multi sensitive E.coli  #Toxic Metabolic Encephalopathy, RESOLVED  - UA positive -> f/u urine culture  - On Methenamine for UTI  prophylaxis will hold  - Will start Ceftriaxone 1g 3/11/24, day 2 of 3  - CT head negative  - Check bladder US  - AVoid opioids, benzos and anticholinergics  - PT consult    #Severe Constipation-RESOLVED  - Contributing to TEM and UTI  - Start Lactulose 20 TID and Dulcolax 5 mg BID  - Mineral oil enema  - Would transition to miralax at home after resolution as it has less side effects  - Check TSH    #Hypokalemia, IMPROVED  #Decreased Appetite  #Vitamin D deficiency  - Replenish Electrolytes  - Start Multivitamin   - Hold Vitamin D 5000u and check levels as 5000u daily is a high dose which may be contributing to constipation    #New onset of Afib  - HR 64  - EP consulted  - Cardio consulted for possible DCCV  - ECG with PAC; likely AF -> Repeat tomorrow and tele monitoring  - daughter agrees with starting AC  - started on Eliquis 5mg bid 3/11/24  - f/u TTE    #CAD  #HFpEF  #HLD  - Continue ASA and Metoprolol  - COntinue LAsix 40 mg AM and 20 mg PM  - Check Lipid profile - Should be on moderate to high intensity statin    #HTN  - continue amlodipine 5 mg daily    #Depression  - Continue Duloxetine 20 mg daily    DVT ppx: Lovenox  GI ppx: Famotidine  DIet: DASH  Dispo: MED    #Progress Note Handoff  Pending (specify): n/a  Family discussion: updaed patient at bedside  Disposition:  home in the next 24-48hrs. home with [facility] with resume mltc via ambulance.

## 2024-03-12 NOTE — CONSULT NOTE ADULT - ASSESSMENT
Newly diagnosed afib  HTN  DLD  chronic LE edema  chronic poliomyelitis  chronic constipation   UTI- resolving    New onset Atrial Fibrillation  - rate controlled with metoprolol XL  - CHADSVASC 5  - continue anticoagulation  - discussed DCCV with patient and daughter and as she is rate controlled, still has an ongoing infection, hemodynamically stable, and will opt for rate control.   - 2d echo pending  - can revisit as an outpatient once infection goes away

## 2024-03-12 NOTE — CONSULT NOTE ADULT - SUBJECTIVE AND OBJECTIVE BOX
Date of Admission: 3/10/24    CHIEF COMPLAINT: confusion, UTI    HISTORY OF PRESENT ILLNESS: 83yFemale with PMH below presented to the hospital for above CC found to have a UTI with encephalopathy and currently being treated and progressing well. Today is comfortable. Does not feel afib or palpitations so unknown duration. HR controlled on its own in the 60s    PAST MEDICAL & SURGICAL HISTORY:  Hypertension      CAD (coronary artery disease)      MDD (major depressive disorder)      Post poliomyelitis syndrome          FAMILY HISTORY:  [ ] no pertinent family history of premature cardiovascular disease in first degree relatives.  Mother:   Father:   Siblings:     SOCIAL HISTORY:    [ ] Non-smoker  [ ] Smoker  [ ] Alcohol    Allergies    penicillin (Rash)  gabapentin (Swelling (Moderate))    Intolerances    	    REVIEW OF SYSTEMS:  CONSTITUTIONAL: No fever, weight loss, or fatigue  CARDIOLOGY: PAtient denies chest pain, shortness of breath or syncopal episodes.   RESPIRATORY: denies shortness of breath, wheezeing.   NEUROLOGICAL: see HPI  ENDOCRINOLOGICAL: no recent change in diabetic medications.   GI: no BRBPR, no N,V,diarrhea.    PSYCHIATRY: normal mood and affect  HEENT: no nasal discharge, no ecchymosis  SKIN: no ecchymosis, no breakdown  MUSCULOSKELETAL: Full range of motion x4.     PHYSICAL EXAM:  T(C): 35.8 (03-12-24 @ 14:44), Max: 35.9 (03-12-24 @ 05:00)  HR: 70 (03-12-24 @ 14:44) (59 - 74)  BP: 160/77 (03-12-24 @ 14:44) (145/81 - 160/77)  RR: 16 (03-12-24 @ 14:44) (16 - 18)  SpO2: --  Wt(kg): --  I&O's Summary    11 Mar 2024 07:01  -  12 Mar 2024 07:00  --------------------------------------------------------  IN: 440 mL / OUT: 1350 mL / NET: -910 mL    12 Mar 2024 07:01  -  12 Mar 2024 17:08  --------------------------------------------------------  IN: 650 mL / OUT: 1250 mL / NET: -600 mL        General Appearance: well appearing, normal for age and gender. 	  Neck: normal JVP, no bruit.   Eyes: No xanthomalasia, Extra Ocular muscles intact.   Cardiovascular: irregular rate and rhythm S1 S2, No JVD, No murmurs, No edema  Respiratory: Lungs clear to auscultation	  Psychiatry: Alert and oriented x 3, Mood & affect appropriate  Gastrointestinal:  Soft, Non-tender  Skin/Integumen: No rashes, No ecchymoses, No cyanosis	  Neurologic: Non-focal  Musculoskeletal/ extremities: Normal range of motion, No clubbing, cyanosis or edema  Vascular: Peripheral pulses palpable 2+ bilaterally    LABS:	 	                          11.3   6.06  )-----------( 181      ( 12 Mar 2024 06:23 )             34.3     03-12    137  |  101  |  5<L>  ----------------------------<  101<H>  3.5   |  27  |  0.5<L>    Ca    9.7      12 Mar 2024 06:23  Phos  2.7     03-12  Mg     1.9     03-12    TPro  6.0  /  Alb  3.4<L>  /  TBili  0.4  /  DBili  x   /  AST  12  /  ALT  6   /  AlkPhos  62  03-12        PT/INR - ( 11 Mar 2024 08:52 )   PT: 12.00 sec;   INR: 1.05 ratio         PTT - ( 11 Mar 2024 08:52 )  PTT:31.4 sec    CARDIAC MARKERS:            TELEMETRY EVENTS: 	    ECG: < from: 12 Lead ECG (03.10.24 @ 18:01) >  Diagnosis Line Atrial fibrillation  Low voltage QRS  Nonspecific T wave abnormality  Prolonged QT  Abnormal ECG    < end of copied text >   	  RADIOLOGY:  OTHER: 	    PREVIOUS DIAGNOSTIC TESTING:    [x ] Echocardiogram: < from: TTE Echo Complete w/o Contrast w/ Doppler (05.14.23 @ 11:39) >   1. Technically difficult study.   2. Normal global left ventricular systolic function with a biplane EF of   60 %. Moderate (grade II) diastolic dysfunctionwith increased left   atrial pressure. Normal internal cavity size and wall thickness.   3. Normal right ventricular size and function.   4. Mildly enlarged left atrium.   5. Normal right atrial size.   6. Mitral annular calcification.   7. Scleroticaortic valve with normal opening.   8. No evidence of pulmonary hypertension.   9. No pericardial effusion.  10. No prior TTE is available for direct image comparison.    < end of copied text >    [ ]  Catheterization:  [ ] Stress Test:  	  	    Home Medications:  aspirin 81 mg oral tablet, chewable: 1 tablet with sensor chewed once a day (10 Mar 2024 17:01)  DULoxetine 20 mg oral delayed release capsule: 1 tab(s) orally once a day (10 Mar 2024 17:01)  High Potency Vitamin D3 125 mcg (5000 intl units) oral capsule: 1 cap(s) orally once a day (10 Mar 2024 17:41)  LACTULOSE 10 GM/15ML SOLN:  (10 Mar 2024 17:01)  Lasix 20 mg oral tablet: 1 tab(s) orally once a day (at bedtime) (10 Mar 2024 17:39)  methenamine hippurate 1 g oral tablet: 1 tab(s) orally 2 times a day (10 Mar 2024 17:01)  METOPROLOL SUCCINATE ER 50:  (10 Mar 2024 17:01)  Norvasc 5 mg oral tablet: 1 tab(s) orally once a day (10 Mar 2024 17:01)    MEDICATIONS  (STANDING):  amLODIPine   Tablet 5 milliGRAM(s) Oral daily  apixaban 5 milliGRAM(s) Oral <User Schedule>  aspirin  chewable 81 milliGRAM(s) Oral daily  bisacodyl 5 milliGRAM(s) Oral every 12 hours  cefTRIAXone   IVPB 1000 milliGRAM(s) IV Intermittent every 24 hours  chlorhexidine 2% Cloths 1 Application(s) Topical <User Schedule>  DULoxetine 20 milliGRAM(s) Oral daily  furosemide    Tablet 40 milliGRAM(s) Oral daily  furosemide    Tablet 20 milliGRAM(s) Oral at bedtime  lactulose Syrup 20 Gram(s) Oral three times a day  metoprolol succinate ER 50 milliGRAM(s) Oral daily  mineral oil enema 133 milliLiter(s) Rectal daily  multivitamin 1 Tablet(s) Oral daily    MEDICATIONS  (PRN):  acetaminophen     Tablet .. 650 milliGRAM(s) Oral every 6 hours PRN Temp greater or equal to 38C (100.4F), Mild Pain (1 - 3)  aluminum hydroxide/magnesium hydroxide/simethicone Suspension 30 milliLiter(s) Oral every 4 hours PRN Dyspepsia  melatonin 3 milliGRAM(s) Oral at bedtime PRN Insomnia  ondansetron Injectable 4 milliGRAM(s) IV Push every 8 hours PRN Nausea and/or Vomiting

## 2024-03-12 NOTE — PROGRESS NOTE ADULT - ASSESSMENT
82 yo female with a PMH of HTN, CAD, recurrent UTI presents for confusion and pelvic pain for 2 days.    #Complicated UTI  #Recurrent UTI - Multisensitive E.coli  #Toxic Metabolic Encephalopathy  - UA positive -> f/u urine culture  - On Methenamine for UTI  prophylaxis will hold  - Will start Ceftriaxone 1g 3/11/24  - CT head negative  - Check bladder US  - AVoid opioids, benzos and anticholinergics  - PT consult    #Severe Constipation-RESOLVED  - Contributing to TEM and UTI  - Start Lactulose 20 TID and Dulcolax 5 mg BID  - Mineral oil enema  - Would transition to miralax at home after resolution as it has less side effects  - Check TSH    #Hypokalemia  #Decreased Appetite  #Vitamin D deficiency  - Replenish Electrolytes  - Start Multivitamin   - Hold Vitamin D 5000u and check levels as 5000u daily is a high dose which may be contributing to constipation    #New onset of Afib  - HR 64  - EP consulted  - Cardio consulted for possible DCCV  - ECG with PAC; likely AF -> Repeat tomorrow and tele monitoring  - daughter agrees with starting AC  - started on Eliquis 5mg bid 3/11/24  - f/u TTE    #CAD  #HFpEF  #HLD  - Continue ASA and Metoprolol  - COntinue LAsix 40 mg AM and 20 mg PM  - Check Lipid profile - Should be on moderate to high intensity statin    #HTN  - continue amlodipine 5 mg daily    #Depression  - Continue Duloxetine 20 mg daily    DVT ppx: Lovenox  GI ppx: Famotidine  DIet: DASH  Dispo: MED

## 2024-03-12 NOTE — PROGRESS NOTE ADULT - SUBJECTIVE AND OBJECTIVE BOX
HPI:  82 yo female with a PMH of HTN, CAD recurrent UTI, MDD, poliomyelitis with sequelae bedbound, presents for confusion and pelvic pain for 2 days. pt accompanied by daughter who states pt was not herself yesterday and this morning mentions to have pelvic pain. denies any other symptoms including fevers, chill, headache, recent illness/travel, cough, abdominal pain, chest pain, or SOB.  The daughter endorses that the Pt constipation has been getting worse recently.    Patient was hemodynamically stable in the ED  Labs with 7K wbc, Potassium 3.4; Trop 20-> 19  UA positive  CTH negative for acute pathologies    Patient admitted for complicated UTI     (10 Mar 2024 16:46)    Primary diagnosis of Acute UTI    24H events:    Today is hospital day 1d. This morning patient was seen and examined at bedside, resting comfortably in bed.    No acute or major events overnight.    PAST MEDICAL & SURGICAL HISTORY  Hypertension    CAD (coronary artery disease)    MDD (major depressive disorder)    Post poliomyelitis syndrome      SOCIAL HISTORY:  Social History:      ALLERGIES:  penicillin (Rash)  gabapentin (Swelling (Moderate))    MEDICATIONS:  STANDING MEDICATIONS  amLODIPine   Tablet 5 milliGRAM(s) Oral daily  aspirin  chewable 81 milliGRAM(s) Oral daily  bisacodyl 5 milliGRAM(s) Oral every 12 hours  cefTRIAXone   IVPB 1000 milliGRAM(s) IV Intermittent every 24 hours  DULoxetine 20 milliGRAM(s) Oral daily  enoxaparin Injectable 40 milliGRAM(s) SubCutaneous every 24 hours  furosemide    Tablet 20 milliGRAM(s) Oral at bedtime  furosemide    Tablet 40 milliGRAM(s) Oral daily  lactulose Syrup 20 Gram(s) Oral three times a day  metoprolol succinate ER 50 milliGRAM(s) Oral daily  mineral oil enema 133 milliLiter(s) Rectal daily  multivitamin 1 Tablet(s) Oral daily    PRN MEDICATIONS  acetaminophen     Tablet .. 650 milliGRAM(s) Oral every 6 hours PRN  aluminum hydroxide/magnesium hydroxide/simethicone Suspension 30 milliLiter(s) Oral every 4 hours PRN  melatonin 3 milliGRAM(s) Oral at bedtime PRN  ondansetron Injectable 4 milliGRAM(s) IV Push every 8 hours PRN    VITALS:   T(F): 96.9  HR: 65  BP: 159/79  RR: 18  SpO2: 99%    PHYSICAL EXAM:  GENERAL:   ( X ) NAD, lying in bed comfortably     (  ) obtunded     (  ) lethargic     (  ) somnolent    HEAD:   ( X ) Atraumatic     (  ) hematoma     (  ) laceration (specify location:       )     NECK:  ( X ) Supple     (  ) neck stiffness     (  ) nuchal rigidity     (  )  no JVD     (  ) JVD present ( -- cm)    HEART:  Rate -->     ( X ) normal rate     (  ) bradycardic     (  ) tachycardic  Rhythm -->     (  ) regular     (  ) regularly irregular     ( X ) irregularly irregular  Murmurs -->     (  ) normal s1s2     (  ) systolic murmur     (  ) diastolic murmur     (  ) continuous murmur      (  ) S3 present     (  ) S4 present    LUNGS:   ( X )Unlabored respirations     (  ) tachypnea  (  ) B/L air entry     (  ) decreased breath sounds in:  (location     )    (X  ) no adventitious sound     (  ) crackles     (  ) wheezing      (  ) rhonchi      (specify location:       )  (  ) chest wall tenderness (specify location:       )    ABDOMEN:   ( X ) Soft     (  ) tense   |   (  ) nondistended     (  ) distended   |   (  ) +BS     (  ) hypoactive bowel sounds     (  ) hyperactive bowel sounds  ( X ) nontender     (  ) RUQ tenderness     (  ) RLQ tenderness     (  ) LLQ tenderness     (  ) epigastric tenderness     (  ) diffuse tenderness  (  ) Splenomegaly      (  ) Hepatomegaly      (  ) Jaundice     (  ) ecchymosis     EXTREMITIES:  (  ) Normal     (  ) Rash     (  ) ecchymosis     (  ) varicose veins      (  ) pitting edema     (  ) non-pitting edema   (  ) ulceration     (  ) gangrene:     (location:     )    NERVOUS SYSTEM:    ( X ) A&Ox3     (  ) confused     (  ) lethargic  CN II-XII:     (  ) Intact     (  ) deficits found     (Specify:     )   Upper extremities:     (  ) no sensorimotor deficits     (  ) weakness     (  ) loss of proprioception/vibration     (  ) loss of touch/temperature (specify:    )  Lower extremities:     (  ) no sensorimotor deficits     (  ) weakness     (  ) loss of proprioception/vibration     (  ) loss of touch/temperature (specify:    )    SKIN:   (  ) No rashes or lesions     (  ) maculopapular rash     (  ) pustules     (  ) vesicles     ( X ) ulcer     (  ) ecchymosis     (specify location:     )    AMPAC score:    (  ) Indwelling Dawson Catheter:   Date insterted:    Reason (  ) Critical illness     (  ) urinary retention    (  ) Accurate Ins/Outs Monitoring     (  ) CMO patient    (  ) Central Line:   Date inserted:  Location: (  ) Right IJ     (  ) Left IJ     (  ) Right Fem     (  ) Left Fem    (  ) SPC        (  ) pigtail       (  ) PEG tube       (  ) colostomy       (  ) jejunostomy  (  ) U-Dall    LABS:                        11.4   6.01  )-----------( 178      ( 11 Mar 2024 08:52 )             34.6     03-11    139  |  100  |  7<L>  ----------------------------<  95  3.7   |  27  |  0.5<L>    Ca    9.7      11 Mar 2024 08:52  Mg     2.1     03-11    TPro  6.0  /  Alb  3.7  /  TBili  0.4  /  DBili  x   /  AST  11  /  ALT  5   /  AlkPhos  64  03-11    PT/INR - ( 11 Mar 2024 08:52 )   PT: 12.00 sec;   INR: 1.05 ratio         PTT - ( 11 Mar 2024 08:52 )  PTT:31.4 sec  Urinalysis Basic - ( 11 Mar 2024 08:52 )    Color: x / Appearance: x / SG: x / pH: x  Gluc: 95 mg/dL / Ketone: x  / Bili: x / Urobili: x   Blood: x / Protein: x / Nitrite: x   Leuk Esterase: x / RBC: x / WBC x   Sq Epi: x / Non Sq Epi: x / Bacteria: x    RADIOLOGY:

## 2024-03-13 ENCOUNTER — TRANSCRIPTION ENCOUNTER (OUTPATIENT)
Age: 84
End: 2024-03-13

## 2024-03-13 LAB
ALBUMIN SERPL ELPH-MCNC: 3.7 G/DL — SIGNIFICANT CHANGE UP (ref 3.5–5.2)
ALP SERPL-CCNC: 66 U/L — SIGNIFICANT CHANGE UP (ref 30–115)
ALT FLD-CCNC: 6 U/L — SIGNIFICANT CHANGE UP (ref 0–41)
ANION GAP SERPL CALC-SCNC: 10 MMOL/L — SIGNIFICANT CHANGE UP (ref 7–14)
AST SERPL-CCNC: 11 U/L — SIGNIFICANT CHANGE UP (ref 0–41)
BASOPHILS # BLD AUTO: 0.06 K/UL — SIGNIFICANT CHANGE UP (ref 0–0.2)
BASOPHILS NFR BLD AUTO: 1.1 % — HIGH (ref 0–1)
BILIRUB SERPL-MCNC: 0.4 MG/DL — SIGNIFICANT CHANGE UP (ref 0.2–1.2)
BUN SERPL-MCNC: 4 MG/DL — LOW (ref 10–20)
CALCIUM SERPL-MCNC: 9.9 MG/DL — SIGNIFICANT CHANGE UP (ref 8.4–10.5)
CHLORIDE SERPL-SCNC: 101 MMOL/L — SIGNIFICANT CHANGE UP (ref 98–110)
CO2 SERPL-SCNC: 27 MMOL/L — SIGNIFICANT CHANGE UP (ref 17–32)
CREAT SERPL-MCNC: <0.5 MG/DL — LOW (ref 0.7–1.5)
EGFR: 98 ML/MIN/1.73M2 — SIGNIFICANT CHANGE UP
EOSINOPHIL # BLD AUTO: 0.25 K/UL — SIGNIFICANT CHANGE UP (ref 0–0.7)
EOSINOPHIL NFR BLD AUTO: 4.6 % — SIGNIFICANT CHANGE UP (ref 0–8)
GLUCOSE SERPL-MCNC: 98 MG/DL — SIGNIFICANT CHANGE UP (ref 70–99)
HCT VFR BLD CALC: 34.8 % — LOW (ref 37–47)
HGB BLD-MCNC: 11.6 G/DL — LOW (ref 12–16)
IMM GRANULOCYTES NFR BLD AUTO: 0.4 % — HIGH (ref 0.1–0.3)
LYMPHOCYTES # BLD AUTO: 1.59 K/UL — SIGNIFICANT CHANGE UP (ref 1.2–3.4)
LYMPHOCYTES # BLD AUTO: 29.5 % — SIGNIFICANT CHANGE UP (ref 20.5–51.1)
MAGNESIUM SERPL-MCNC: 2 MG/DL — SIGNIFICANT CHANGE UP (ref 1.8–2.4)
MCHC RBC-ENTMCNC: 29.3 PG — SIGNIFICANT CHANGE UP (ref 27–31)
MCHC RBC-ENTMCNC: 33.3 G/DL — SIGNIFICANT CHANGE UP (ref 32–37)
MCV RBC AUTO: 87.9 FL — SIGNIFICANT CHANGE UP (ref 81–99)
MONOCYTES # BLD AUTO: 0.82 K/UL — HIGH (ref 0.1–0.6)
MONOCYTES NFR BLD AUTO: 15.2 % — HIGH (ref 1.7–9.3)
NEUTROPHILS # BLD AUTO: 2.65 K/UL — SIGNIFICANT CHANGE UP (ref 1.4–6.5)
NEUTROPHILS NFR BLD AUTO: 49.2 % — SIGNIFICANT CHANGE UP (ref 42.2–75.2)
NRBC # BLD: 0 /100 WBCS — SIGNIFICANT CHANGE UP (ref 0–0)
PHOSPHATE SERPL-MCNC: 3 MG/DL — SIGNIFICANT CHANGE UP (ref 2.1–4.9)
PLATELET # BLD AUTO: 198 K/UL — SIGNIFICANT CHANGE UP (ref 130–400)
PMV BLD: 10.1 FL — SIGNIFICANT CHANGE UP (ref 7.4–10.4)
POTASSIUM SERPL-MCNC: 3.5 MMOL/L — SIGNIFICANT CHANGE UP (ref 3.5–5)
POTASSIUM SERPL-SCNC: 3.5 MMOL/L — SIGNIFICANT CHANGE UP (ref 3.5–5)
PROT SERPL-MCNC: 6.1 G/DL — SIGNIFICANT CHANGE UP (ref 6–8)
RBC # BLD: 3.96 M/UL — LOW (ref 4.2–5.4)
RBC # FLD: 13.4 % — SIGNIFICANT CHANGE UP (ref 11.5–14.5)
SODIUM SERPL-SCNC: 138 MMOL/L — SIGNIFICANT CHANGE UP (ref 135–146)
WBC # BLD: 5.39 K/UL — SIGNIFICANT CHANGE UP (ref 4.8–10.8)
WBC # FLD AUTO: 5.39 K/UL — SIGNIFICANT CHANGE UP (ref 4.8–10.8)

## 2024-03-13 PROCEDURE — 99232 SBSQ HOSP IP/OBS MODERATE 35: CPT

## 2024-03-13 RX ORDER — LACTULOSE 10 G/15ML
10 SOLUTION ORAL
Qty: 0 | Refills: 2 | DISCHARGE

## 2024-03-13 RX ORDER — AMLODIPINE BESYLATE 2.5 MG/1
1 TABLET ORAL
Refills: 0 | DISCHARGE

## 2024-03-13 RX ORDER — DULOXETINE HYDROCHLORIDE 30 MG/1
1 CAPSULE, DELAYED RELEASE ORAL
Refills: 0 | DISCHARGE

## 2024-03-13 RX ORDER — AMLODIPINE BESYLATE 2.5 MG/1
1 TABLET ORAL
Qty: 0 | Refills: 0 | DISCHARGE
Start: 2024-03-13

## 2024-03-13 RX ORDER — METOPROLOL TARTRATE 50 MG
1 TABLET ORAL
Qty: 0 | Refills: 2 | DISCHARGE

## 2024-03-13 RX ORDER — APIXABAN 2.5 MG/1
1 TABLET, FILM COATED ORAL
Qty: 60 | Refills: 0
Start: 2024-03-13 | End: 2024-04-11

## 2024-03-13 RX ORDER — DULOXETINE HYDROCHLORIDE 30 MG/1
1 CAPSULE, DELAYED RELEASE ORAL
Qty: 0 | Refills: 0 | DISCHARGE
Start: 2024-03-13

## 2024-03-13 RX ORDER — FUROSEMIDE 40 MG
1 TABLET ORAL
Refills: 0 | DISCHARGE

## 2024-03-13 RX ADMIN — Medication 50 MILLIGRAM(S): at 05:56

## 2024-03-13 RX ADMIN — Medication 40 MILLIGRAM(S): at 05:57

## 2024-03-13 RX ADMIN — Medication 81 MILLIGRAM(S): at 11:35

## 2024-03-13 RX ADMIN — Medication 1 TABLET(S): at 11:35

## 2024-03-13 RX ADMIN — APIXABAN 5 MILLIGRAM(S): 2.5 TABLET, FILM COATED ORAL at 05:56

## 2024-03-13 RX ADMIN — CHLORHEXIDINE GLUCONATE 1 APPLICATION(S): 213 SOLUTION TOPICAL at 06:00

## 2024-03-13 RX ADMIN — AMLODIPINE BESYLATE 5 MILLIGRAM(S): 2.5 TABLET ORAL at 06:00

## 2024-03-13 RX ADMIN — Medication 20 MILLIGRAM(S): at 22:28

## 2024-03-13 RX ADMIN — CEFTRIAXONE 100 MILLIGRAM(S): 500 INJECTION, POWDER, FOR SOLUTION INTRAMUSCULAR; INTRAVENOUS at 11:35

## 2024-03-13 RX ADMIN — APIXABAN 5 MILLIGRAM(S): 2.5 TABLET, FILM COATED ORAL at 17:39

## 2024-03-13 RX ADMIN — DULOXETINE HYDROCHLORIDE 20 MILLIGRAM(S): 30 CAPSULE, DELAYED RELEASE ORAL at 11:35

## 2024-03-13 RX ADMIN — LACTULOSE 20 GRAM(S): 10 SOLUTION ORAL at 22:30

## 2024-03-13 NOTE — DISCHARGE NOTE PROVIDER - NSDCCPCAREPLAN_GEN_ALL_CORE_FT
PRINCIPAL DISCHARGE DIAGNOSIS  Diagnosis: Acute UTI  Assessment and Plan of Treatment: You came in to the hospital with urinary tract infection. You were treated with 3 days course of ABx. Please follow up with your PCP      SECONDARY DISCHARGE DIAGNOSES  Diagnosis: Afib  Assessment and Plan of Treatment: You were also found to have irregular heartbeat and was found to have Atrial fibrilation. Please take your medications as advised and follow up with cardiologist as advised     PRINCIPAL DISCHARGE DIAGNOSIS  Diagnosis: Acute UTI  Assessment and Plan of Treatment: You were noted either on arrival or during this hospitalization to have a Urinary Tract Infection. You may have already been treated and completed the antibiotics, please refer to the list of medications present on your discharge paperwork.  It is not necessary to repeat a urine test to see if the infection is gone, it is assumed that after treatment it should have resolved. However, if you continue to have symptoms, please see your Primary Care doctor or return to the ER.        SECONDARY DISCHARGE DIAGNOSES  Diagnosis: Afib  Assessment and Plan of Treatment: You were also found to have irregular heartbeat and was found to have Atrial fibrilation. Please take your medications as advised and follow up with cardiologist as advised

## 2024-03-13 NOTE — DISCHARGE NOTE PROVIDER - NSDCMRMEDTOKEN_GEN_ALL_CORE_FT
aspirin 81 mg oral tablet, chewable: 1 tablet with sensor chewed once a day  DULoxetine 20 mg oral delayed release capsule: 1 tab(s) orally once a day  furosemide 40 mg oral tablet: 1 tab(s) orally once a day  High Potency Vitamin D3 125 mcg (5000 intl units) oral capsule: 1 cap(s) orally once a day  LACTULOSE 10 GM/15ML SOLN:   Lasix 20 mg oral tablet: 1 tab(s) orally once a day (at bedtime)  methenamine hippurate 1 g oral tablet: 1 tab(s) orally 2 times a day  METOPROLOL SUCCINATE ER 50:   Norvasc 5 mg oral tablet: 1 tab(s) orally once a day   amLODIPine 5 mg oral tablet: 1 tab(s) orally once a day  apixaban 5 mg oral tablet: 1 tab(s) orally 2 times a day  aspirin 81 mg oral tablet, chewable: 1 tablet with sensor chewed once a day  DULoxetine 20 mg oral delayed release capsule: 1 cap(s) orally once a day  furosemide 40 mg oral tablet: 1 tab(s) orally once a day  High Potency Vitamin D3 125 mcg (5000 intl units) oral capsule: 1 cap(s) orally once a day  LACTULOSE 10 GM/15ML SOLN: 10 milliliter(s) orally once a day (at bedtime)  methenamine hippurate 1 g oral tablet: 1 tab(s) orally 2 times a day  METOPROLOL SUCCINATE ER 50: 1 tab(s) orally once a day

## 2024-03-13 NOTE — DISCHARGE NOTE PROVIDER - CARE PROVIDERS DIRECT ADDRESSES
[email or web address]

## 2024-03-13 NOTE — PROGRESS NOTE ADULT - ASSESSMENT
84 yo female with a PMH of HTN, CAD, recurrent UTI presents for confusion and pelvic pain for 2 days.    #Complicated UTI  #Recurrent UTI - Multi sensitive E.coli  #Toxic Metabolic Encephalopathy, RESOLVED  - UA positive -> f/u urine culture  - On Methenamine for UTI  prophylaxis will hold  - Will start Ceftriaxone 1g 3/11/24, day 3 of 3  - CT head negative  - Check bladder US  - AVoid opioids, benzos and anticholinergics  - PT consult    #Severe Constipation-RESOLVED  - Contributing to TEM and UTI  - Start Lactulose 20 TID and Dulcolax 5 mg BID  - Mineral oil enema  - Would transition to miralax at home after resolution as it has less side effects  - Check TSH    #Hypokalemia, IMPROVED  #Decreased Appetite  #Vitamin D deficiency  - Replenish Electrolytes  - Start Multivitamin   - Hold Vitamin D 5000u and check levels as 5000u daily is a high dose which may be contributing to constipation    #New onset of Afib  - HR 64  - EP consulted  - Cardio consulted for possible DCCV  - ECG with PAC; likely AF -> Repeat tomorrow and tele monitoring  - daughter agrees with starting AC  - started on Eliquis 5mg bid 3/11/24  - f/u TTE    #CAD  #HFpEF  #HLD  - Continue ASA and Metoprolol  - COntinue LAsix 40 mg AM and 20 mg PM  - Check Lipid profile - Should be on moderate to high intensity statin    #HTN  - continue amlodipine 5 mg daily    #Depression  - Continue Duloxetine 20 mg daily    DVT ppx: Lovenox  GI ppx: Famotidine  DIet: DASH  Dispo: MED    #Progress Note Handoff  Pending (specify): n/a  Family discussion: updaed patient at bedside  Disposition:  home tomorrow. home with [facility] with resume mltc via ambulance

## 2024-03-13 NOTE — DISCHARGE NOTE PROVIDER - CARE PROVIDER_API CALL
Natacha Mederos  Geriatric Medicine  [address] [place] [id number]  Phone: [phone]  Fax: [phone]  Follow Up Time:     Dmitri Garcia  Cardiovascular Disease  [address] [place] [address] [address]  [place] [address]  Phone: [phone]  Fax: [phone]  Follow Up Time:

## 2024-03-13 NOTE — PROGRESS NOTE ADULT - SUBJECTIVE AND OBJECTIVE BOX
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 no major complaints or concerns.       REVIEW OF SYSTEMS:  CONSTITUTIONAL: No fever, weight loss, or fatigue  RESPIRATORY: No cough, wheezing, chills or hemoptysis; No shortness of breath  CARDIOVASCULAR: No chest pain, palpitations, dizziness, or leg swelling  GASTROINTESTINAL: No abdominal or epigastric pain. No nausea, vomiting, or hematemesis; No diarrhea or constipation. No melena or hematochezia.  GENITOURINARY: No dysuria, frequency, hematuria, or incontinence  NEUROLOGICAL: No headaches, memory loss, loss of strength, numbness, or tremors  SKIN: No itching, burning, rashes, or lesions   MUSCULOSKELETAL: No joint pain or swelling; No muscle, back, or extremity pain  PSYCHIATRIC: No depression, anxiety, mood swings, or difficulty sleeping  All other review of systems negative    T(C): 35.7 (03-13-24 @ 12:57), Max: 36.1 (03-12-24 @ 22:55)  HR: 64 (03-13-24 @ 12:57) (63 - 70)  BP: 149/74 (03-13-24 @ 12:57) (142/83 - 160/77)  RR: 17 (03-13-24 @ 12:57) (16 - 18)  SpO2: 98% (03-13-24 @ 05:53) (98% - 98%)  Wt(kg): --Vital Signs Last 24 Hrs  T(C): 35.7 (13 Mar 2024 12:57), Max: 36.1 (12 Mar 2024 22:55)  T(F): 96.2 (13 Mar 2024 12:57), Max: 96.9 (12 Mar 2024 22:55)  HR: 64 (13 Mar 2024 12:57) (63 - 70)  BP: 149/74 (13 Mar 2024 12:57) (142/83 - 160/77)  BP(mean): --  RR: 17 (13 Mar 2024 12:57) (16 - 18)  SpO2: 98% (13 Mar 2024 05:53) (98% - 98%)    Parameters below as of 13 Mar 2024 05:53  Patient On (Oxygen Delivery Method): room air          03-12-24 @ 07:01  -  03-13-24 @ 07:00  --------------------------------------------------------  IN: 650 mL / OUT: 1675 mL / NET: -1025 mL    03-13-24 @ 07:01  -  03-13-24 @ 13:59  --------------------------------------------------------  IN: 325 mL / OUT: 1000 mL / NET: -675 mL        PHYSICAL EXAM:  GENERAL: obese F, NAD, non toxic appearing  EYES: anicteric sclera, non injected conjunctiva, EOMI  PSYCH: no agitation, baseline mentation  NERVOUS SYSTEM:  Alert & Oriented X3   PULMONARY: symmetrical chest rise, no accessory muscle use  CARDIOVASCULAR: non tachycardic  GI: protuberant  EXTREMITIES:  No clubbing, cyanosis, or edema  SKIN: No rashes or lesions    Consultant(s) Notes Reviewed:  [x ] YES  [ ] NO    Discussed with Consultants/Other Providers [ x] YES     LABS                          11.6   5.39  )-----------( 198      ( 13 Mar 2024 06:44 )             34.8     03-13    138  |  101  |  4<L>  ----------------------------<  98  3.5   |  27  |  <0.5<L>    Ca    9.9      13 Mar 2024 06:44  Phos  3.0     03-13  Mg     2.0     03-13    TPro  6.1  /  Alb  3.7  /  TBili  0.4  /  DBili  x   /  AST  11  /  ALT  6   /  AlkPhos  66  03-13      Urinalysis Basic - ( 13 Mar 2024 06:44 )    Color: x / Appearance: x / SG: x / pH: x  Gluc: 98 mg/dL / Ketone: x  / Bili: x / Urobili: x   Blood: x / Protein: x / Nitrite: x   Leuk Esterase: x / RBC: x / WBC x   Sq Epi: x / Non Sq Epi: x / Bacteria: x    RADIOLOGY & ADDITIONAL TESTS:  - no images 3/13  Imaging Personally Reviewed:  [ ] YES  [ x] NO    HEALTH ISSUES - PROBLEM Dx:      MEDICATIONS  (STANDING):  amLODIPine   Tablet 5 milliGRAM(s) Oral daily  apixaban 5 milliGRAM(s) Oral <User Schedule>  aspirin  chewable 81 milliGRAM(s) Oral daily  bisacodyl 5 milliGRAM(s) Oral every 12 hours  cefTRIAXone   IVPB 1000 milliGRAM(s) IV Intermittent every 24 hours  chlorhexidine 2% Cloths 1 Application(s) Topical <User Schedule>  DULoxetine 20 milliGRAM(s) Oral daily  furosemide    Tablet 40 milliGRAM(s) Oral daily  furosemide    Tablet 20 milliGRAM(s) Oral at bedtime  lactulose Syrup 20 Gram(s) Oral three times a day  metoprolol succinate ER 50 milliGRAM(s) Oral daily  mineral oil enema 133 milliLiter(s) Rectal daily  multivitamin 1 Tablet(s) Oral daily    MEDICATIONS  (PRN):  acetaminophen     Tablet .. 650 milliGRAM(s) Oral every 6 hours PRN Temp greater or equal to 38C (100.4F), Mild Pain (1 - 3)  aluminum hydroxide/magnesium hydroxide/simethicone Suspension 30 milliLiter(s) Oral every 4 hours PRN Dyspepsia  melatonin 3 milliGRAM(s) Oral at bedtime PRN Insomnia  ondansetron Injectable 4 milliGRAM(s) IV Push every 8 hours PRN Nausea and/or Vomiting

## 2024-03-13 NOTE — DISCHARGE NOTE PROVIDER - HOSPITAL COURSE
84 yo female with a PMH of HTN, CAD recurrent UTI, MDD, poliomyelitis with sequelae bedbound, presented for confusion and pelvic pain for 2 days. pt accompanied by daughter who stated pt was not herself yesterday and this morning mentions to have pelvic pain.   The daughter endorsed that the Pt constipation had been getting worse recently.    Patient was hemodynamically stable in the ED  Labs with 7K wbc, Potassium 3.4; Trop 20-> 19  UA positive  CTH negative for acute pathologies    Patient admitted for complicated UTI    Pt was admitted for UTI and treated with 3 days of ABx. Pt was found to be in Afib during this admission. Pt seen by cardiology and CV discussed but will follow outpt as pt has active infection now and will discuss on out patient visit

## 2024-03-13 NOTE — DISCHARGE NOTE PROVIDER - ATTENDING DISCHARGE PHYSICAL EXAMINATION:
GENERAL: obese F, NAD, non toxic appearing  EYES: anicteric sclera, non injected conjunctiva, EOMI  PSYCH: no agitation, baseline mentation  NERVOUS SYSTEM:  Alert & Oriented X3   PULMONARY: symmetrical chest rise, no accessory muscle use  CARDIOVASCULAR: non tachycardic  GI: protuberant  EXTREMITIES:  No clubbing, cyanosis, or edema  SKIN: No rashes or lesions

## 2024-03-14 VITALS
DIASTOLIC BLOOD PRESSURE: 74 MMHG | RESPIRATION RATE: 17 BRPM | SYSTOLIC BLOOD PRESSURE: 166 MMHG | HEART RATE: 79 BPM | TEMPERATURE: 97 F

## 2024-03-14 LAB
ALBUMIN SERPL ELPH-MCNC: 3.9 G/DL — SIGNIFICANT CHANGE UP (ref 3.5–5.2)
ALP SERPL-CCNC: 66 U/L — SIGNIFICANT CHANGE UP (ref 30–115)
ALT FLD-CCNC: 7 U/L — SIGNIFICANT CHANGE UP (ref 0–41)
ANION GAP SERPL CALC-SCNC: 11 MMOL/L — SIGNIFICANT CHANGE UP (ref 7–14)
AST SERPL-CCNC: 13 U/L — SIGNIFICANT CHANGE UP (ref 0–41)
BASOPHILS # BLD AUTO: 0.06 K/UL — SIGNIFICANT CHANGE UP (ref 0–0.2)
BASOPHILS NFR BLD AUTO: 1.1 % — HIGH (ref 0–1)
BILIRUB SERPL-MCNC: 0.6 MG/DL — SIGNIFICANT CHANGE UP (ref 0.2–1.2)
BUN SERPL-MCNC: 6 MG/DL — LOW (ref 10–20)
CALCIUM SERPL-MCNC: 9.9 MG/DL — SIGNIFICANT CHANGE UP (ref 8.4–10.4)
CHLORIDE SERPL-SCNC: 100 MMOL/L — SIGNIFICANT CHANGE UP (ref 98–110)
CO2 SERPL-SCNC: 30 MMOL/L — SIGNIFICANT CHANGE UP (ref 17–32)
CREAT SERPL-MCNC: 0.5 MG/DL — LOW (ref 0.7–1.5)
EGFR: 92 ML/MIN/1.73M2 — SIGNIFICANT CHANGE UP
EOSINOPHIL # BLD AUTO: 0.25 K/UL — SIGNIFICANT CHANGE UP (ref 0–0.7)
EOSINOPHIL NFR BLD AUTO: 4.8 % — SIGNIFICANT CHANGE UP (ref 0–8)
GLUCOSE SERPL-MCNC: 96 MG/DL — SIGNIFICANT CHANGE UP (ref 70–99)
HCT VFR BLD CALC: 36.6 % — LOW (ref 37–47)
HGB BLD-MCNC: 12.3 G/DL — SIGNIFICANT CHANGE UP (ref 12–16)
IMM GRANULOCYTES NFR BLD AUTO: 0.8 % — HIGH (ref 0.1–0.3)
LYMPHOCYTES # BLD AUTO: 1.79 K/UL — SIGNIFICANT CHANGE UP (ref 1.2–3.4)
LYMPHOCYTES # BLD AUTO: 34.1 % — SIGNIFICANT CHANGE UP (ref 20.5–51.1)
MAGNESIUM SERPL-MCNC: 2 MG/DL — SIGNIFICANT CHANGE UP (ref 1.8–2.4)
MCHC RBC-ENTMCNC: 29.4 PG — SIGNIFICANT CHANGE UP (ref 27–31)
MCHC RBC-ENTMCNC: 33.6 G/DL — SIGNIFICANT CHANGE UP (ref 32–37)
MCV RBC AUTO: 87.4 FL — SIGNIFICANT CHANGE UP (ref 81–99)
MONOCYTES # BLD AUTO: 0.58 K/UL — SIGNIFICANT CHANGE UP (ref 0.1–0.6)
MONOCYTES NFR BLD AUTO: 11 % — HIGH (ref 1.7–9.3)
NEUTROPHILS # BLD AUTO: 2.53 K/UL — SIGNIFICANT CHANGE UP (ref 1.4–6.5)
NEUTROPHILS NFR BLD AUTO: 48.2 % — SIGNIFICANT CHANGE UP (ref 42.2–75.2)
NRBC # BLD: 0 /100 WBCS — SIGNIFICANT CHANGE UP (ref 0–0)
PHOSPHATE SERPL-MCNC: 3.2 MG/DL — SIGNIFICANT CHANGE UP (ref 2.1–4.9)
PLATELET # BLD AUTO: 222 K/UL — SIGNIFICANT CHANGE UP (ref 130–400)
PMV BLD: 9.8 FL — SIGNIFICANT CHANGE UP (ref 7.4–10.4)
POTASSIUM SERPL-MCNC: 3.5 MMOL/L — SIGNIFICANT CHANGE UP (ref 3.5–5)
POTASSIUM SERPL-SCNC: 3.5 MMOL/L — SIGNIFICANT CHANGE UP (ref 3.5–5)
PROT SERPL-MCNC: 6.2 G/DL — SIGNIFICANT CHANGE UP (ref 6–8)
RBC # BLD: 4.19 M/UL — LOW (ref 4.2–5.4)
RBC # FLD: 13.5 % — SIGNIFICANT CHANGE UP (ref 11.5–14.5)
SODIUM SERPL-SCNC: 141 MMOL/L — SIGNIFICANT CHANGE UP (ref 135–146)
WBC # BLD: 5.25 K/UL — SIGNIFICANT CHANGE UP (ref 4.8–10.8)
WBC # FLD AUTO: 5.25 K/UL — SIGNIFICANT CHANGE UP (ref 4.8–10.8)

## 2024-03-14 PROCEDURE — 99239 HOSP IP/OBS DSCHRG MGMT >30: CPT

## 2024-03-14 RX ADMIN — Medication 50 MILLIGRAM(S): at 06:14

## 2024-03-14 RX ADMIN — APIXABAN 5 MILLIGRAM(S): 2.5 TABLET, FILM COATED ORAL at 06:14

## 2024-03-14 RX ADMIN — CHLORHEXIDINE GLUCONATE 1 APPLICATION(S): 213 SOLUTION TOPICAL at 06:16

## 2024-03-14 RX ADMIN — AMLODIPINE BESYLATE 5 MILLIGRAM(S): 2.5 TABLET ORAL at 06:14

## 2024-03-14 RX ADMIN — Medication 40 MILLIGRAM(S): at 06:16

## 2024-03-16 LAB
CULTURE RESULTS: SIGNIFICANT CHANGE UP
CULTURE RESULTS: SIGNIFICANT CHANGE UP
SPECIMEN SOURCE: SIGNIFICANT CHANGE UP
SPECIMEN SOURCE: SIGNIFICANT CHANGE UP

## 2024-03-20 DIAGNOSIS — G92.8 OTHER TOXIC ENCEPHALOPATHY: ICD-10-CM

## 2024-03-20 DIAGNOSIS — G14 POSTPOLIO SYNDROME: ICD-10-CM

## 2024-03-20 DIAGNOSIS — I48.91 UNSPECIFIED ATRIAL FIBRILLATION: ICD-10-CM

## 2024-03-20 DIAGNOSIS — I25.10 ATHEROSCLEROTIC HEART DISEASE OF NATIVE CORONARY ARTERY WITHOUT ANGINA PECTORIS: ICD-10-CM

## 2024-03-20 DIAGNOSIS — I11.0 HYPERTENSIVE HEART DISEASE WITH HEART FAILURE: ICD-10-CM

## 2024-03-20 DIAGNOSIS — I50.32 CHRONIC DIASTOLIC (CONGESTIVE) HEART FAILURE: ICD-10-CM

## 2024-03-20 DIAGNOSIS — Z79.82 LONG TERM (CURRENT) USE OF ASPIRIN: ICD-10-CM

## 2024-03-20 DIAGNOSIS — B96.20 UNSPECIFIED ESCHERICHIA COLI [E. COLI] AS THE CAUSE OF DISEASES CLASSIFIED ELSEWHERE: ICD-10-CM

## 2024-03-20 DIAGNOSIS — Z74.01 BED CONFINEMENT STATUS: ICD-10-CM

## 2024-03-20 DIAGNOSIS — K59.09 OTHER CONSTIPATION: ICD-10-CM

## 2024-03-20 DIAGNOSIS — N39.0 URINARY TRACT INFECTION, SITE NOT SPECIFIED: ICD-10-CM

## 2024-03-20 DIAGNOSIS — Z88.0 ALLERGY STATUS TO PENICILLIN: ICD-10-CM

## 2024-03-20 DIAGNOSIS — F32.9 MAJOR DEPRESSIVE DISORDER, SINGLE EPISODE, UNSPECIFIED: ICD-10-CM

## 2024-03-20 DIAGNOSIS — E55.9 VITAMIN D DEFICIENCY, UNSPECIFIED: ICD-10-CM

## 2024-03-20 DIAGNOSIS — E87.6 HYPOKALEMIA: ICD-10-CM

## 2024-03-22 ENCOUNTER — APPOINTMENT (OUTPATIENT)
Dept: GERIATRICS | Facility: HOME HEALTH | Age: 84
End: 2024-03-22
Payer: MEDICARE

## 2024-03-22 VITALS
OXYGEN SATURATION: 99 % | HEART RATE: 56 BPM | RESPIRATION RATE: 18 BRPM | TEMPERATURE: 98.2 F | SYSTOLIC BLOOD PRESSURE: 115 MMHG | DIASTOLIC BLOOD PRESSURE: 75 MMHG

## 2024-03-22 DIAGNOSIS — R60.0 LOCALIZED EDEMA: ICD-10-CM

## 2024-03-22 DIAGNOSIS — F32.A DEPRESSION, UNSPECIFIED: ICD-10-CM

## 2024-03-22 DIAGNOSIS — E53.8 DEFICIENCY OF OTHER SPECIFIED B GROUP VITAMINS: ICD-10-CM

## 2024-03-22 PROCEDURE — 99495 TRANSJ CARE MGMT MOD F2F 14D: CPT

## 2024-03-22 RX ORDER — CELECOXIB 200 MG/1
200 CAPSULE ORAL
Qty: 30 | Refills: 0 | Status: DISCONTINUED | COMMUNITY
Start: 2023-06-03 | End: 2024-03-22

## 2024-03-22 RX ORDER — METOPROLOL SUCCINATE 50 MG/1
50 TABLET, EXTENDED RELEASE ORAL DAILY
Qty: 90 | Refills: 2 | Status: ACTIVE | COMMUNITY

## 2024-03-22 NOTE — PHYSICAL EXAM
[Alert] : alert [Normal Outer Ear/Nose] : the ears and nose were normal in appearance [Normal Appearance] : the appearance of the neck was normal [Supple] : the neck was supple [No Respiratory Distress] : no respiratory distress [No Acc Muscle Use] : no accessory muscle use [Respiration, Rhythm And Depth] : normal respiratory rhythm and effort [Auscultation Breath Sounds / Voice Sounds] : lungs were clear to auscultation bilaterally [Abdomen Tenderness] : non-tender [Abdomen Soft] : soft [No Spinal Tenderness] : no spinal tenderness [No Clubbing, Cyanosis] : no clubbing or cyanosis of the fingernails [Involuntary Movements] : no involuntary movements were seen [] : no rash [Skin Lesions] : no skin lesions [No Focal Deficits] : no focal deficits [Oriented To Time, Place, And Person] : oriented to person, place, and time [de-identified] : irr irr [de-identified] : 1+ BLE pitting edema [de-identified] : + BLE weakness

## 2024-03-22 NOTE — HISTORY OF PRESENT ILLNESS
[FreeTextEntry1] : Patient seen and examined at home.  Patient is homebound 2/2 generalized weakness/debility.  HHA present during visit.  Patient s/p recent hospital admission AMS.  Patient found to have UTI, admitted and treated with IV ABX.  patient returned to baseline.  Found to have new onset AFIB.  Metoprolol increased to 50mg QD and DOAC started (CHADSVASC 5).  Patient offered DCCV but refusing.  Patient discharged home.  Patient doing well since discharge.  No acute complaints.  No CP/SOB. No abdominal complaints with good appetite and regular BM's.  No urinary complaints.  No recent falls.  No skin wounds.  Patient with worsening weakness since discharge.    ADL Assessment: - Patient needs assistance to ambulate/transfer - Patient needs assistance to perform toileting - Patient needs assistance to perform personal hygiene/grooming - Patient needs assistance to cook - Patient does not need assistance to eat - Patient needs assistance to dress  - Patient does not walk, uses Coco lift for transfers.  Patient needs assistance with all ADL's.

## 2024-03-22 NOTE — ASSESSMENT
[FreeTextEntry1] : #Recent admission for UTI - completed 3 days abx - back to baseline  #New onset AFIB 3/24 - + rate control - c/w Metoprolol and DOAC - outpatient Cards FU - Refusing DCCV  #HFpEF (G2DD on ECHO 5/23) - at baseline - weights QD - c/w Furosemide 40mg PO QD and 20 g qPM - BLE elevation - outpatient Cards FU - ECHO 3/24:      Summary:  1. Normal global left ventricular systolic function.  2. LV Ejection Fraction by Howard's Method with a biplane EF of 58 %.  3. The left ventricular diastolic function could not be assessed in this  study due to atrial fibrillation.  4. Normal left atrial size.  5. Normal right atrial size.  6. Mild mitral valve regurgitation.  7. Mild tricuspid regurgitation.  8. Estimated pulmonary artery systolic pressure is 41.2 mmHg assuming a  right atrial pressure of 3 mmHg, which is consistent with mild pulmonary  hypertension.  9. Sclerotic aortic valve with normal opening. 10. Mild pulmonic valve regurgitation. 11. Dilatation of the ascending aorta.  #HTN, ?CAD - follows with Cards, Dr. Aldana - unknown Hx CAD but on BB and ASA, no statin, no previous hx PCI - c/w BB/ASA - c/w Amlodipine for BP control  #MDD - c/w Cymbalta - outpatient Psych FU as scheduled  #Constipation - c/w Colace 100mg TID and Senna 1 PO BID - c/w Lactulose 30ml PO QD PRN if no BM in 2 days - may give enema or supp if no BM 2 days with lactulose - increase PO Fluids  #Postpolio Syndrome/Gait Dysfunction #s/p fall with left tibia Fx 6/23 - s/p CYNTHIA stay, discharged 10/23 - no further FU with Ortho - Patient non-ambulatory - using greg lift - Has Celebrex PRN for pain - WC bound - c/w PT/OT - Fall precautions

## 2024-04-08 RX ORDER — APIXABAN 5 MG/1
5 TABLET, FILM COATED ORAL
Qty: 180 | Refills: 3 | Status: ACTIVE | COMMUNITY
Start: 1900-01-01 | End: 1900-01-01

## 2024-06-06 PROBLEM — G14 POSTPOLIO SYNDROME: Status: ACTIVE | Noted: 2022-01-19

## 2024-06-06 PROBLEM — I10 BENIGN ESSENTIAL HTN: Status: ACTIVE | Noted: 2022-01-19

## 2024-06-06 PROBLEM — I48.91 ATRIAL FIBRILLATION, UNSPECIFIED TYPE: Status: ACTIVE | Noted: 2024-03-22

## 2024-06-06 PROBLEM — E78.5 HYPERLIPIDEMIA: Status: ACTIVE | Noted: 2023-01-31

## 2024-06-06 PROBLEM — I50.30 HEART FAILURE WITH PRESERVED LEFT VENTRICULAR FUNCTION (HFPEF): Status: ACTIVE | Noted: 2023-05-26

## 2024-06-10 ENCOUNTER — APPOINTMENT (OUTPATIENT)
Dept: GERIATRICS | Facility: HOME HEALTH | Age: 84
End: 2024-06-10
Payer: MEDICARE

## 2024-06-10 VITALS
OXYGEN SATURATION: 98 % | TEMPERATURE: 97.2 F | HEART RATE: 68 BPM | RESPIRATION RATE: 16 BRPM | DIASTOLIC BLOOD PRESSURE: 58 MMHG | SYSTOLIC BLOOD PRESSURE: 120 MMHG

## 2024-06-10 DIAGNOSIS — I10 ESSENTIAL (PRIMARY) HYPERTENSION: ICD-10-CM

## 2024-06-10 DIAGNOSIS — I50.30 UNSPECIFIED DIASTOLIC (CONGESTIVE) HEART FAILURE: ICD-10-CM

## 2024-06-10 DIAGNOSIS — E78.5 HYPERLIPIDEMIA, UNSPECIFIED: ICD-10-CM

## 2024-06-10 DIAGNOSIS — G14 POSTPOLIO SYNDROME: ICD-10-CM

## 2024-06-10 DIAGNOSIS — I48.91 UNSPECIFIED ATRIAL FIBRILLATION: ICD-10-CM

## 2024-06-10 PROCEDURE — 99349 HOME/RES VST EST MOD MDM 40: CPT

## 2024-06-10 NOTE — ASSESSMENT
[FreeTextEntry1] : #AFIB 3/24 (CHADSVASC 5) - refused DCCV in hospital - + rate control - c/w BB - c/w OAC  #HFpEF (G2DD on ECHO 5/23) - at baseline - weights QD - c/w Furosemide 40mg PO QD - BLE elevation - outpatient Cards FU - ECHO 3/24 in note 3/22/24      #HTN #?CAD - follows with Cards, Dr. Aldana - unknown Hx CAD but on BB and ASA, no statin, no previous hx PCI - c/w BB/ASA - c/w Amlodipine for BP control  #MDD - c/w Cymbalta - outpatient Psych FU as scheduled  #Constipation - c/w Colace 100mg TID and Senna 1 PO BID - Add Lactulose 30ml PO BID PRN - increase PO Fluids - moving QD  #Postpolio Syndrome/Gait Dysfunction #s/p fall with left tibia Fx 6/23 - s/p CYNTHIA stay, discharged 10/23 - no further FU with Ortho - Patient non-ambulatory - using greg lift - Has Celebrex PRN for pain - WC bound - c/w PT/OT - Fall precautions

## 2024-06-10 NOTE — PHYSICAL EXAM
[Alert] : alert [Normal Outer Ear/Nose] : the ears and nose were normal in appearance [Normal Appearance] : the appearance of the neck was normal [Supple] : the neck was supple [No Respiratory Distress] : no respiratory distress [No Acc Muscle Use] : no accessory muscle use [Respiration, Rhythm And Depth] : normal respiratory rhythm and effort [Auscultation Breath Sounds / Voice Sounds] : lungs were clear to auscultation bilaterally [Abdomen Tenderness] : non-tender [Abdomen Soft] : soft [No Spinal Tenderness] : no spinal tenderness [No Clubbing, Cyanosis] : no clubbing or cyanosis of the fingernails [Involuntary Movements] : no involuntary movements were seen [] : no rash [Skin Lesions] : no skin lesions [No Focal Deficits] : no focal deficits [Oriented To Time, Place, And Person] : oriented to person, place, and time [de-identified] : irr irr [de-identified] : 1+ BLE pitting edema [de-identified] : + BLE weakness

## 2024-06-10 NOTE — HISTORY OF PRESENT ILLNESS
[FreeTextEntry1] : Patient seen and examined at home.  Patient is homebound 2/2 generalized weakness/debility.  HHA present during visit.

## 2024-06-21 ENCOUNTER — NON-APPOINTMENT (OUTPATIENT)
Age: 84
End: 2024-06-21

## 2024-06-21 LAB
ALBUMIN SERPL ELPH-MCNC: 3.6 G/DL
ALP BLD-CCNC: 57 U/L
ALT SERPL-CCNC: 5 U/L
ANION GAP SERPL CALC-SCNC: 10 MMOL/L
AST SERPL-CCNC: 11 U/L
BASOPHILS # BLD AUTO: 0.06 K/UL
BASOPHILS NFR BLD AUTO: 1.1 %
BILIRUB SERPL-MCNC: 0.4 MG/DL
BUN SERPL-MCNC: 13 MG/DL
CALCIUM SERPL-MCNC: 9.5 MG/DL
CHLORIDE SERPL-SCNC: 103 MMOL/L
CHOLEST SERPL-MCNC: 168 MG/DL
CO2 SERPL-SCNC: 26 MMOL/L
CREAT SERPL-MCNC: 0.6 MG/DL
EGFR: 88 ML/MIN/1.73M2
EOSINOPHIL # BLD AUTO: 0.24 K/UL
EOSINOPHIL NFR BLD AUTO: 4.5 %
ESTIMATED AVERAGE GLUCOSE: 100 MG/DL
FOLATE SERPL-MCNC: 3.5 NG/ML
GLUCOSE SERPL-MCNC: 88 MG/DL
HBA1C MFR BLD HPLC: 5.1 %
HCT VFR BLD CALC: 34.1 %
HDLC SERPL-MCNC: 51 MG/DL
HGB BLD-MCNC: 11 G/DL
IMM GRANULOCYTES NFR BLD AUTO: 0.4 %
LDLC SERPL CALC-MCNC: 106 MG/DL
LYMPHOCYTES # BLD AUTO: 1.75 K/UL
LYMPHOCYTES NFR BLD AUTO: 32.6 %
MAN DIFF?: NORMAL
MCHC RBC-ENTMCNC: 28.6 PG
MCHC RBC-ENTMCNC: 32.3 G/DL
MCV RBC AUTO: 88.8 FL
MONOCYTES # BLD AUTO: 0.51 K/UL
MONOCYTES NFR BLD AUTO: 9.5 %
NEUTROPHILS # BLD AUTO: 2.78 K/UL
NEUTROPHILS NFR BLD AUTO: 51.9 %
NONHDLC SERPL-MCNC: 117 MG/DL
PLATELET # BLD AUTO: 202 K/UL
PMV BLD AUTO: 0 /100 WBCS
POTASSIUM SERPL-SCNC: 3.7 MMOL/L
PROT SERPL-MCNC: 5.5 G/DL
RBC # BLD: 3.84 M/UL
RBC # FLD: 14.2 %
SODIUM SERPL-SCNC: 139 MMOL/L
T3 SERPL-MCNC: 74 NG/DL
T4 FREE SERPL-MCNC: 1.3 NG/DL
TRIGL SERPL-MCNC: 54 MG/DL
TSH SERPL-ACNC: 0.54 UIU/ML
VIT B12 SERPL-MCNC: 398 PG/ML
WBC # FLD AUTO: 5.36 K/UL

## 2024-06-21 RX ORDER — FOLIC ACID 1 MG/1
1 TABLET ORAL DAILY
Qty: 90 | Refills: 3 | Status: ACTIVE | COMMUNITY
Start: 2024-06-21 | End: 1900-01-01

## 2024-07-18 ENCOUNTER — NON-APPOINTMENT (OUTPATIENT)
Age: 84
End: 2024-07-18

## 2024-07-18 RX ORDER — LEVOFLOXACIN 250 MG/1
250 TABLET, FILM COATED ORAL DAILY
Qty: 5 | Refills: 0 | Status: ACTIVE | COMMUNITY
Start: 2024-07-18 | End: 1900-01-01

## 2024-08-01 ENCOUNTER — APPOINTMENT (OUTPATIENT)
Dept: GERIATRICS | Facility: HOME HEALTH | Age: 84
End: 2024-08-01

## 2024-08-01 DIAGNOSIS — F03.90 UNSPECIFIED DEMENTIA W/OUT BEHAVIORAL DISTURBANCE: ICD-10-CM

## 2024-08-01 DIAGNOSIS — I65.29 OCCLUSION AND STENOSIS OF UNSPECIFIED CAROTID ARTERY: ICD-10-CM

## 2024-08-01 DIAGNOSIS — I10 ESSENTIAL (PRIMARY) HYPERTENSION: ICD-10-CM

## 2024-08-01 PROCEDURE — 99349 HOME/RES VST EST MOD MDM 40: CPT

## 2024-08-02 VITALS
SYSTOLIC BLOOD PRESSURE: 115 MMHG | OXYGEN SATURATION: 97 % | HEART RATE: 70 BPM | TEMPERATURE: 98 F | RESPIRATION RATE: 17 BRPM | DIASTOLIC BLOOD PRESSURE: 60 MMHG

## 2024-08-02 PROBLEM — F03.90 DEMENTIA WITHOUT BEHAVIORAL DISTURBANCE, PSYCHOTIC DISTURBANCE, MOOD DISTURBANCE, OR ANXIETY, UNSPECIFIED DEMENTIA SEVERITY, UNSPECIFIED DEMENTIA TYPE: Status: ACTIVE | Noted: 2024-08-02

## 2024-08-02 NOTE — PHYSICAL EXAM
[Normal] : normal bowel sounds, non-tender [No Focal Deficits] : no focal deficits [de-identified] : forgetful, confused

## 2024-08-02 NOTE — ASSESSMENT
[FreeTextEntry1] : 84y.o. F seen at home, daughter present, as well as her aid. All report that patients cognition has been getting progressively worse over the past several months. At the time of the visit, patient was unable to state the current month, unable to name the current president. Has difficulty finding words. No neuro deficits. Considering that confusion is getting progressively worse, it is most likely Dementia causing the confusion.  Due to being bed-bound it will be difficult for the patient to f/u with psychiatry or neurology.  Patient will need assistance with all ADLs and IADLs, supportive care.

## 2024-11-05 ENCOUNTER — NON-APPOINTMENT (OUTPATIENT)
Age: 84
End: 2024-11-05

## 2024-11-05 ENCOUNTER — APPOINTMENT (OUTPATIENT)
Dept: GERIATRICS | Facility: HOME HEALTH | Age: 84
End: 2024-11-05

## 2024-11-05 PROCEDURE — 99442: CPT | Mod: 93

## 2024-11-07 ENCOUNTER — RX RENEWAL (OUTPATIENT)
Age: 84
End: 2024-11-07

## 2024-11-20 ENCOUNTER — APPOINTMENT (OUTPATIENT)
Dept: GERIATRICS | Facility: HOME HEALTH | Age: 84
End: 2024-11-20
Payer: MEDICARE

## 2024-11-20 VITALS
RESPIRATION RATE: 18 BRPM | HEART RATE: 62 BPM | DIASTOLIC BLOOD PRESSURE: 72 MMHG | OXYGEN SATURATION: 98 % | TEMPERATURE: 97.2 F | SYSTOLIC BLOOD PRESSURE: 120 MMHG

## 2024-11-20 DIAGNOSIS — I50.30 UNSPECIFIED DIASTOLIC (CONGESTIVE) HEART FAILURE: ICD-10-CM

## 2024-11-20 DIAGNOSIS — R60.0 LOCALIZED EDEMA: ICD-10-CM

## 2024-11-20 DIAGNOSIS — I48.91 UNSPECIFIED ATRIAL FIBRILLATION: ICD-10-CM

## 2024-11-20 DIAGNOSIS — E78.5 HYPERLIPIDEMIA, UNSPECIFIED: ICD-10-CM

## 2024-11-20 DIAGNOSIS — I10 ESSENTIAL (PRIMARY) HYPERTENSION: ICD-10-CM

## 2024-11-20 DIAGNOSIS — F03.90 UNSPECIFIED DEMENTIA W/OUT BEHAVIORAL DISTURBANCE: ICD-10-CM

## 2024-11-20 PROCEDURE — 99349 HOME/RES VST EST MOD MDM 40: CPT

## 2024-12-19 ENCOUNTER — NON-APPOINTMENT (OUTPATIENT)
Age: 84
End: 2024-12-19

## 2025-01-05 ENCOUNTER — EMERGENCY (EMERGENCY)
Facility: HOSPITAL | Age: 85
LOS: 0 days | Discharge: HOME | End: 2025-01-06
Attending: EMERGENCY MEDICINE
Payer: MEDICARE

## 2025-01-05 VITALS
WEIGHT: 274.03 LBS | DIASTOLIC BLOOD PRESSURE: 90 MMHG | RESPIRATION RATE: 18 BRPM | HEART RATE: 65 BPM | OXYGEN SATURATION: 99 % | SYSTOLIC BLOOD PRESSURE: 161 MMHG | TEMPERATURE: 98 F

## 2025-01-05 DIAGNOSIS — K59.00 CONSTIPATION, UNSPECIFIED: ICD-10-CM

## 2025-01-05 DIAGNOSIS — A80.9 ACUTE POLIOMYELITIS, UNSPECIFIED: ICD-10-CM

## 2025-01-05 DIAGNOSIS — I25.10 ATHEROSCLEROTIC HEART DISEASE OF NATIVE CORONARY ARTERY WITHOUT ANGINA PECTORIS: ICD-10-CM

## 2025-01-05 DIAGNOSIS — I10 ESSENTIAL (PRIMARY) HYPERTENSION: ICD-10-CM

## 2025-01-05 DIAGNOSIS — Z88.8 ALLERGY STATUS TO OTHER DRUGS, MEDICAMENTS AND BIOLOGICAL SUBSTANCES: ICD-10-CM

## 2025-01-05 DIAGNOSIS — Z88.0 ALLERGY STATUS TO PENICILLIN: ICD-10-CM

## 2025-01-05 DIAGNOSIS — F32.9 MAJOR DEPRESSIVE DISORDER, SINGLE EPISODE, UNSPECIFIED: ICD-10-CM

## 2025-01-05 LAB
ALBUMIN SERPL ELPH-MCNC: 4.3 G/DL — SIGNIFICANT CHANGE UP (ref 3.5–5.2)
ALP SERPL-CCNC: 71 U/L — SIGNIFICANT CHANGE UP (ref 30–115)
ALT FLD-CCNC: 9 U/L — SIGNIFICANT CHANGE UP (ref 0–41)
ANION GAP SERPL CALC-SCNC: 10 MMOL/L — SIGNIFICANT CHANGE UP (ref 7–14)
AST SERPL-CCNC: 15 U/L — SIGNIFICANT CHANGE UP (ref 0–41)
BASOPHILS # BLD AUTO: 0.07 K/UL — SIGNIFICANT CHANGE UP (ref 0–0.2)
BASOPHILS NFR BLD AUTO: 0.9 % — SIGNIFICANT CHANGE UP (ref 0–1)
BILIRUB SERPL-MCNC: 0.5 MG/DL — SIGNIFICANT CHANGE UP (ref 0.2–1.2)
BUN SERPL-MCNC: 15 MG/DL — SIGNIFICANT CHANGE UP (ref 10–20)
CALCIUM SERPL-MCNC: 9.9 MG/DL — SIGNIFICANT CHANGE UP (ref 8.4–10.5)
CHLORIDE SERPL-SCNC: 102 MMOL/L — SIGNIFICANT CHANGE UP (ref 98–110)
CO2 SERPL-SCNC: 27 MMOL/L — SIGNIFICANT CHANGE UP (ref 17–32)
CREAT SERPL-MCNC: 0.6 MG/DL — LOW (ref 0.7–1.5)
EGFR: 88 ML/MIN/1.73M2 — SIGNIFICANT CHANGE UP
EOSINOPHIL # BLD AUTO: 0.41 K/UL — SIGNIFICANT CHANGE UP (ref 0–0.7)
EOSINOPHIL NFR BLD AUTO: 5.1 % — SIGNIFICANT CHANGE UP (ref 0–8)
GLUCOSE SERPL-MCNC: 88 MG/DL — SIGNIFICANT CHANGE UP (ref 70–99)
HCT VFR BLD CALC: 39.7 % — SIGNIFICANT CHANGE UP (ref 37–47)
HGB BLD-MCNC: 12.7 G/DL — SIGNIFICANT CHANGE UP (ref 12–16)
IMM GRANULOCYTES NFR BLD AUTO: 0.4 % — HIGH (ref 0.1–0.3)
LACTATE SERPL-SCNC: 1.4 MMOL/L — SIGNIFICANT CHANGE UP (ref 0.7–2)
LIDOCAIN IGE QN: 22 U/L — SIGNIFICANT CHANGE UP (ref 7–60)
LYMPHOCYTES # BLD AUTO: 2.08 K/UL — SIGNIFICANT CHANGE UP (ref 1.2–3.4)
LYMPHOCYTES # BLD AUTO: 26.1 % — SIGNIFICANT CHANGE UP (ref 20.5–51.1)
MAGNESIUM SERPL-MCNC: 2.1 MG/DL — SIGNIFICANT CHANGE UP (ref 1.8–2.4)
MCHC RBC-ENTMCNC: 29.1 PG — SIGNIFICANT CHANGE UP (ref 27–31)
MCHC RBC-ENTMCNC: 32 G/DL — SIGNIFICANT CHANGE UP (ref 32–37)
MCV RBC AUTO: 91.1 FL — SIGNIFICANT CHANGE UP (ref 81–99)
MONOCYTES # BLD AUTO: 0.59 K/UL — SIGNIFICANT CHANGE UP (ref 0.1–0.6)
MONOCYTES NFR BLD AUTO: 7.4 % — SIGNIFICANT CHANGE UP (ref 1.7–9.3)
NEUTROPHILS # BLD AUTO: 4.8 K/UL — SIGNIFICANT CHANGE UP (ref 1.4–6.5)
NEUTROPHILS NFR BLD AUTO: 60.1 % — SIGNIFICANT CHANGE UP (ref 42.2–75.2)
NRBC # BLD: 0 /100 WBCS — SIGNIFICANT CHANGE UP (ref 0–0)
PLATELET # BLD AUTO: 227 K/UL — SIGNIFICANT CHANGE UP (ref 130–400)
PMV BLD: 10.5 FL — HIGH (ref 7.4–10.4)
POTASSIUM SERPL-MCNC: 4.6 MMOL/L — SIGNIFICANT CHANGE UP (ref 3.5–5)
POTASSIUM SERPL-SCNC: 4.6 MMOL/L — SIGNIFICANT CHANGE UP (ref 3.5–5)
PROT SERPL-MCNC: 6.5 G/DL — SIGNIFICANT CHANGE UP (ref 6–8)
RBC # BLD: 4.36 M/UL — SIGNIFICANT CHANGE UP (ref 4.2–5.4)
RBC # FLD: 13.7 % — SIGNIFICANT CHANGE UP (ref 11.5–14.5)
SODIUM SERPL-SCNC: 139 MMOL/L — SIGNIFICANT CHANGE UP (ref 135–146)
WBC # BLD: 7.98 K/UL — SIGNIFICANT CHANGE UP (ref 4.8–10.8)
WBC # FLD AUTO: 7.98 K/UL — SIGNIFICANT CHANGE UP (ref 4.8–10.8)

## 2025-01-05 PROCEDURE — 36415 COLL VENOUS BLD VENIPUNCTURE: CPT

## 2025-01-05 PROCEDURE — 83605 ASSAY OF LACTIC ACID: CPT

## 2025-01-05 PROCEDURE — 74177 CT ABD & PELVIS W/CONTRAST: CPT | Mod: MC

## 2025-01-05 PROCEDURE — 83690 ASSAY OF LIPASE: CPT

## 2025-01-05 PROCEDURE — 80053 COMPREHEN METABOLIC PANEL: CPT

## 2025-01-05 PROCEDURE — 85025 COMPLETE CBC W/AUTO DIFF WBC: CPT

## 2025-01-05 PROCEDURE — 74177 CT ABD & PELVIS W/CONTRAST: CPT | Mod: 26,MC

## 2025-01-05 PROCEDURE — 99285 EMERGENCY DEPT VISIT HI MDM: CPT

## 2025-01-05 PROCEDURE — 83735 ASSAY OF MAGNESIUM: CPT

## 2025-01-05 PROCEDURE — 99284 EMERGENCY DEPT VISIT MOD MDM: CPT | Mod: 25

## 2025-01-05 RX ORDER — SODIUM CHLORIDE 9 MG/ML
1000 INJECTION, SOLUTION INTRAMUSCULAR; INTRAVENOUS; SUBCUTANEOUS ONCE
Refills: 0 | Status: COMPLETED | OUTPATIENT
Start: 2025-01-05 | End: 2025-01-05

## 2025-01-05 RX ORDER — SODIUM PHOSPHATE, DIBASIC, UNSPECIFIED FORM AND SODIUM PHOSPHATE, MONOBASIC, UNSPECIFIED FORM 7; 19 G/133ML; G/133ML
1 ENEMA RECTAL ONCE
Refills: 0 | Status: COMPLETED | OUTPATIENT
Start: 2025-01-05 | End: 2025-01-05

## 2025-01-05 RX ADMIN — SODIUM PHOSPHATE, DIBASIC, UNSPECIFIED FORM AND SODIUM PHOSPHATE, MONOBASIC, UNSPECIFIED FORM 1 ENEMA: 7; 19 ENEMA RECTAL at 20:10

## 2025-01-05 RX ADMIN — SODIUM CHLORIDE 1000 MILLILITER(S): 9 INJECTION, SOLUTION INTRAMUSCULAR; INTRAVENOUS; SUBCUTANEOUS at 17:02

## 2025-01-05 NOTE — ED PROVIDER NOTE - CARE PROVIDER_API CALL
Natacha Mederos  Geriatric Medicine  [address] [place] [address], [address]  [place] [address]  Phone: [phone]  Fax: [phone]  Follow Up Time: 4-6 Days

## 2025-01-05 NOTE — ED PROVIDER NOTE - CLINICAL SUMMARY MEDICAL DECISION MAKING FREE TEXT BOX
Received from signout.  CT shows no obstruction and no large fecal load to suggest severe constipation.  D/W daughter at bedside.  DC home.  Continue home bowel regimen as scheduled.  Strict return instructions discussed.

## 2025-01-05 NOTE — ED PROVIDER NOTE - OBJECTIVE STATEMENT
84-year-old female with HTN, CAD recurrent UTI, MDD, poliomyelitis bedbound at bilateral lower leg paralysis is brought in by aide for evaluation of constipation x 4 days.  Patient has had no bowel movements for the past 4 days despite taking MiraLAX, suppositories and enema.  Aide and daughter performed JIE without appreciating any stool in the rectal vault.  Patient denies any fever, abdominal pain, nausea, vomiting, diarrhea or urinary symptoms.

## 2025-01-05 NOTE — ED PROVIDER NOTE - ATTENDING CONTRIBUTION TO CARE
84-year-old female past medical history of polio bedbound paralyzed and legs, heart arrhythmia dementia chronic constipation on lactulose senna and Dulcolax daily, presents with 4 days of constipation unable to have bowel movement.  Aide at bedside states normally has bowel movement daily.  No nausea vomiting abdominal pain.  No fever.  Patient was given suppository yesterday and enema today with no relief.  No fever.  No chest pain shortness of breath.  Patient aide and daughter at bedside both state they tried to disimpact patient at home with no stool present in vault.    On exam, AFVSS, Well appearing, No acute distress, NCAT, EOMI, PERRLA, MMM, Neck supple, LCTAB, RRR nl s1s2 No mrg, Abdomen Soft NTND, no CVA tenderness, patient refused rectal exam, AAOx3, chronic leg weakness, no Focal Deficits, No LE edema or calf TTP,    A/P; constipation, will rule out obstruction, labs CT IV fluids consider enema reeval

## 2025-01-05 NOTE — ED ADULT NURSE NOTE - BREATHING, MLM
Spontaneous, unlabored and symmetrical Ilumya Counseling: I discussed with the patient the risks of tildrakizumab including but not limited to immunosuppression, malignancy, posterior leukoencephalopathy syndrome, and serious infections.  The patient understands that monitoring is required including a PPD at baseline and must alert us or the primary physician if symptoms of infection or other concerning signs are noted. Azathioprine Pregnancy And Lactation Text: This medication is Pregnancy Category D and isn't considered safe during pregnancy. It is unknown if this medication is excreted in breast milk. Arava Pregnancy And Lactation Text: This medication is Pregnancy Category X and is absolutely contraindicated during pregnancy. It is unknown if it is excreted in breast milk. Azithromycin Counseling:  I discussed with the patient the risks of azithromycin including but not limited to GI upset, allergic reaction, drug rash, diarrhea, and yeast infections. Valtrex Counseling: I discussed with the patient the risks of valacyclovir including but not limited to kidney damage, nausea, vomiting and severe allergy.  The patient understands that if the infection seems to be worsening or is not improving, they are to call. Elidel Pregnancy And Lactation Text: This medication is Pregnancy Category C. It is unknown if this medication is excreted in breast milk. Carac Counseling:  I discussed with the patient the risks of Carac including but not limited to erythema, scaling, itching, weeping, crusting, and pain. Winlevi Pregnancy And Lactation Text: This medication is considered safe during pregnancy and breastfeeding. High Dose Vitamin A Counseling: Side effects reviewed, pt to contact office should one occur. Hydroxyzine Counseling: Patient advised that the medication is sedating and not to drive a car after taking this medication.  Patient informed of potential adverse effects including but not limited to dry mouth, urinary retention, and blurry vision.  The patient verbalized understanding of the proper use and possible adverse effects of hydroxyzine.  All of the patient's questions and concerns were addressed. Simponi Pregnancy And Lactation Text: The risk during pregnancy and breastfeeding is uncertain with this medication. Oxybutynin Pregnancy And Lactation Text: This medication is Pregnancy Category B and is considered safe during pregnancy. It is unknown if it is excreted in breast milk. Metronidazole Counseling:  I discussed with the patient the risks of metronidazole including but not limited to seizures, nausea/vomiting, a metallic taste in the mouth, nausea/vomiting and severe allergy. Minoxidil Counseling: Minoxidil is a topical medication which can increase blood flow where it is applied. It is uncertain how this medication increases hair growth. Side effects are uncommon and include stinging and allergic reactions. Glycopyrrolate Pregnancy And Lactation Text: This medication is Pregnancy Category B and is considered safe during pregnancy. It is unknown if it is excreted breast milk. Hydroxyzine Pregnancy And Lactation Text: This medication is not safe during pregnancy and should not be taken. It is also excreted in breast milk and breast feeding isn't recommended. Topical Clindamycin Counseling: Patient counseled that this medication may cause skin irritation or allergic reactions.  In the event of skin irritation, the patient was advised to reduce the amount of the drug applied or use it less frequently.   The patient verbalized understanding of the proper use and possible adverse effects of clindamycin.  All of the patient's questions and concerns were addressed. Dutasteride Pregnancy And Lactation Text: This medication is absolutely contraindicated in women, especially during pregnancy and breast feeding. Feminization of male fetuses is possible if taking while pregnant. Ketoconazole Counseling:   Patient counseled regarding improving absorption with orange juice.  Adverse effects include but are not limited to breast enlargement, headache, diarrhea, nausea, upset stomach, liver function test abnormalities, taste disturbance, and stomach pain.  There is a rare possibility of liver failure that can occur when taking ketoconazole. The patient understands that monitoring of LFTs may be required, especially at baseline. The patient verbalized understanding of the proper use and possible adverse effects of ketoconazole.  All of the patient's questions and concerns were addressed. Nsaids Counseling: NSAID Counseling: I discussed with the patient that NSAIDs should be taken with food. Prolonged use of NSAIDs can result in the development of stomach ulcers.  Patient advised to stop taking NSAIDs if abdominal pain occurs.  The patient verbalized understanding of the proper use and possible adverse effects of NSAIDs.  All of the patient's questions and concerns were addressed. Cimzia Pregnancy And Lactation Text: This medication crosses the placenta but can be considered safe in certain situations. Cimzia may be excreted in breast milk. Doxycycline Pregnancy And Lactation Text: This medication is Pregnancy Category D and not consider safe during pregnancy. It is also excreted in breast milk but is considered safe for shorter treatment courses. Prednisone Pregnancy And Lactation Text: This medication is Pregnancy Category C and it isn't know if it is safe during pregnancy. This medication is excreted in breast milk. Xolair Pregnancy And Lactation Text: This medication is Pregnancy Category B and is considered safe during pregnancy. This medication is excreted in breast milk. Xeljanz Counseling: I discussed with the patient the risks of Xeljanz therapy including increased risk of infection, liver issues, headache, diarrhea, or cold symptoms. Live vaccines should be avoided. They were instructed to call if they have any problems. Cellcept Counseling:  I discussed with the patient the risks of mycophenolate mofetil including but not limited to infection/immunosuppression, GI upset, hypokalemia, hypercholesterolemia, bone marrow suppression, lymphoproliferative disorders, malignancy, GI ulceration/bleed/perforation, colitis, interstitial lung disease, kidney failure, progressive multifocal leukoencephalopathy, and birth defects.  The patient understands that monitoring is required including a baseline creatinine and regular CBC testing. In addition, patient must alert us immediately if symptoms of infection or other concerning signs are noted. Protopic Pregnancy And Lactation Text: This medication is Pregnancy Category C. It is unknown if this medication is excreted in breast milk when applied topically. Sarecycline Pregnancy And Lactation Text: This medication is Pregnancy Category D and not consider safe during pregnancy. It is also excreted in breast milk. Clofazimine Counseling:  I discussed with the patient the risks of clofazimine including but not limited to skin and eye pigmentation, liver damage, nausea/vomiting, gastrointestinal bleeding and allergy. Valtrex Pregnancy And Lactation Text: this medication is Pregnancy Category B and is considered safe during pregnancy. This medication is not directly found in breast milk but it's metabolite acyclovir is present. High Dose Vitamin A Pregnancy And Lactation Text: High dose vitamin A therapy is contraindicated during pregnancy and breast feeding. Tetracycline Counseling: Patient counseled regarding possible photosensitivity and increased risk for sunburn.  Patient instructed to avoid sunlight, if possible.  When exposed to sunlight, patients should wear protective clothing, sunglasses, and sunscreen.  The patient was instructed to call the office immediately if the following severe adverse effects occur:  hearing changes, easy bruising/bleeding, severe headache, or vision changes.  The patient verbalized understanding of the proper use and possible adverse effects of tetracycline.  All of the patient's questions and concerns were addressed. Patient understands to avoid pregnancy while on therapy due to potential birth defects. Elidel Counseling: Patient may experience a mild burning sensation during topical application. Elidel is not approved in children less than 2 years of age. There have been case reports of hematologic and skin malignancies in patients using topical calcineurin inhibitors although causality is questionable. Carac Pregnancy And Lactation Text: This medication is Pregnancy Category X and contraindicated in pregnancy and in women who may become pregnant. It is unknown if this medication is excreted in breast milk. VTAMA Counseling: I discussed with the patient that VTAMA is not for use in the eyes, mouth or mouth. They should call the office if they develop any signs of allergic reactions to VTAMA. The patient verbalized understanding of the proper use and possible adverse effects of VTAMA.  All of the patient's questions and concerns were addressed. Propranolol Counseling:  I discussed with the patient the risks of propranolol including but not limited to low heart rate, low blood pressure, low blood sugar, restlessness and increased cold sensitivity. They should call the office if they experience any of these side effects. Klisyri Pregnancy And Lactation Text: It is unknown if this medication can harm a developing fetus or if it is excreted in breast milk. Skyrizi Counseling: I discussed with the patient the risks of risankizumab-rzaa including but not limited to immunosuppression, and serious infections.  The patient understands that monitoring is required including a PPD at baseline and must alert us or the primary physician if symptoms of infection or other concerning signs are noted. Hydroxychloroquine Counseling:  I discussed with the patient that a baseline ophthalmologic exam is needed at the start of therapy and every year thereafter while on therapy. A CBC may also be warranted for monitoring.  The side effects of this medication were discussed with the patient, including but not limited to agranulocytosis, aplastic anemia, seizures, rashes, retinopathy, and liver toxicity. Patient instructed to call the office should any adverse effect occur.  The patient verbalized understanding of the proper use and possible adverse effects of Plaquenil.  All the patient's questions and concerns were addressed. Nsaids Pregnancy And Lactation Text: These medications are considered safe up to 30 weeks gestation. It is excreted in breast milk. Finasteride Counseling:  I discussed with the patient the risks of use of finasteride including but not limited to decreased libido, decreased ejaculate volume, gynecomastia, and depression. Women should not handle medication.  All of the patient's questions and concerns were addressed. Erythromycin Counseling:  I discussed with the patient the risks of erythromycin including but not limited to GI upset, allergic reaction, drug rash, diarrhea, increase in liver enzymes, and yeast infections. Xelmauriz Pregnancy And Lactation Text: This medication is Pregnancy Category D and is not considered safe during pregnancy.  The risk during breast feeding is also uncertain. Protopic Counseling: Patient may experience a mild burning sensation during topical application. Protopic is not approved in children less than 2 years of age. There have been case reports of hematologic and skin malignancies in patients using topical calcineurin inhibitors although causality is questionable. Metronidazole Pregnancy And Lactation Text: This medication is Pregnancy Category B and considered safe during pregnancy.  It is also excreted in breast milk. Use Enhanced Medication Counseling?: No Clofazimine Pregnancy And Lactation Text: This medication is Pregnancy Category C and isn't considered safe during pregnancy. It is excreted in breast milk. Ketoconazole Pregnancy And Lactation Text: This medication is Pregnancy Category C and it isn't know if it is safe during pregnancy. It is also excreted in breast milk and breast feeding isn't recommended. Drysol Pregnancy And Lactation Text: This medication is considered safe during pregnancy and breast feeding. Cosentyx Counseling:  I discussed with the patient the risks of Cosentyx including but not limited to worsening of Crohn's disease, immunosuppression, allergic reactions and infections.  The patient understands that monitoring is required including a PPD at baseline and must alert us or the primary physician if symptoms of infection or other concerning signs are noted. Azithromycin Pregnancy And Lactation Text: This medication is considered safe during pregnancy and is also secreted in breast milk. Erivedge Counseling- I discussed with the patient the risks of Erivedge including but not limited to nausea, vomiting, diarrhea, constipation, weight loss, changes in the sense of taste, decreased appetite, muscle spasms, and hair loss.  The patient verbalized understanding of the proper use and possible adverse effects of Erivedge.  All of the patient's questions and concerns were addressed. Cibinqo Counseling: I discussed with the patient the risks of Cibinqo therapy including but not limited to common cold, nausea, headache, cold sores, increased blood CPK levels, dizziness, UTIs, fatigue, acne, and vomitting. Live vaccines should be avoided.  This medication has been linked to serious infections; higher rate of mortality; malignancy and lymphoproliferative disorders; major adverse cardiovascular events; thrombosis; thrombocytopenia and lymphopenia; lipid elevations; and retinal detachment. Cosentyx Pregnancy And Lactation Text: This medication is Pregnancy Category B and is considered safe during pregnancy. It is unknown if this medication is excreted in breast milk. Vtama Pregnancy And Lactation Text: It is unknown if this medication can cause problems during pregnancy and breastfeeding. Propranolol Pregnancy And Lactation Text: This medication is Pregnancy Category C and it isn't known if it is safe during pregnancy. It is excreted in breast milk. Klisyri Counseling:  I discussed with the patient the risks of Klisyri including but not limited to erythema, scaling, itching, weeping, crusting, and pain. Calcipotriene Counseling:  I discussed with the patient the risks of calcipotriene including but not limited to erythema, scaling, itching, and irritation. Topical Ketoconazole Counseling: Patient counseled that this medication may cause skin irritation or allergic reactions.  In the event of skin irritation, the patient was advised to reduce the amount of the drug applied or use it less frequently.   The patient verbalized understanding of the proper use and possible adverse effects of ketoconazole.  All of the patient's questions and concerns were addressed. Albendazole Counseling:  I discussed with the patient the risks of albendazole including but not limited to cytopenia, kidney damage, nausea/vomiting and severe allergy.  The patient understands that this medication is being used in an off-label manner. Finasteride Pregnancy And Lactation Text: This medication is absolutely contraindicated during pregnancy. It is unknown if it is excreted in breast milk. Infliximab Counseling:  I discussed with the patient the risks of infliximab including but not limited to myelosuppression, immunosuppression, autoimmune hepatitis, demyelinating diseases, lymphoma, and serious infections.  The patient understands that monitoring is required including a PPD at baseline and must alert us or the primary physician if symptoms of infection or other concerning signs are noted. Hydroxychloroquine Pregnancy And Lactation Text: This medication has been shown to cause fetal harm but it isn't assigned a Pregnancy Risk Category. There are small amounts excreted in breast milk. Olanzapine Counseling- I discussed with the patient the common side effects of olanzapine including but are not limited to: lack of energy, dry mouth, increased appetite, sleepiness, tremor, constipation, dizziness, changes in behavior, or restlessness.  Explained that teenagers are more likely to experience headaches, abdominal pain, pain in the arms or legs, tiredness, and sleepiness.  Serious side effects include but are not limited: increased risk of death in elderly patients who are confused, have memory loss, or dementia-related psychosis; hyperglycemia; increased cholesterol and triglycerides; and weight gain. Colchicine Counseling:  Patient counseled regarding adverse effects including but not limited to stomach upset (nausea, vomiting, stomach pain, or diarrhea).  Patient instructed to limit alcohol consumption while taking this medication.  Colchicine may reduce blood counts especially with prolonged use.  The patient understands that monitoring of kidney function and blood counts may be required, especially at baseline. The patient verbalized understanding of the proper use and possible adverse effects of colchicine.  All of the patient's questions and concerns were addressed. Cyclophosphamide Counseling:  I discussed with the patient the risks of cyclophosphamide including but not limited to hair loss, hormonal abnormalities, decreased fertility, abdominal pain, diarrhea, nausea and vomiting, bone marrow suppression and infection. The patient understands that monitoring is required while taking this medication. Erythromycin Pregnancy And Lactation Text: This medication is Pregnancy Category B and is considered safe during pregnancy. It is also excreted in breast milk. Stelara Counseling:  I discussed with the patient the risks of ustekinumab including but not limited to immunosuppression, malignancy, posterior leukoencephalopathy syndrome, and serious infections.  The patient understands that monitoring is required including a PPD at baseline and must alert us or the primary physician if symptoms of infection or other concerning signs are noted. Bactrim Counseling:  I discussed with the patient the risks of sulfa antibiotics including but not limited to GI upset, allergic reaction, drug rash, diarrhea, dizziness, photosensitivity, and yeast infections.  Rarely, more serious reactions can occur including but not limited to aplastic anemia, agranulocytosis, methemoglobinemia, blood dyscrasias, liver or kidney failure, lung infiltrates or desquamative/blistering drug rashes. Cibinqo Pregnancy And Lactation Text: It is unknown if this medication will adversely affect pregnancy or breast feeding.  You should not take this medication if you are currently pregnant or planning a pregnancy or while breastfeeding. Drysol Counseling:  I discussed with the patient the risks of drysol/aluminum chloride including but not limited to skin rash, itching, irritation, burning. Minocycline Counseling: Patient advised regarding possible photosensitivity and discoloration of the teeth, skin, lips, tongue and gums.  Patient instructed to avoid sunlight, if possible.  When exposed to sunlight, patients should wear protective clothing, sunglasses, and sunscreen.  The patient was instructed to call the office immediately if the following severe adverse effects occur:  hearing changes, easy bruising/bleeding, severe headache, or vision changes.  The patient verbalized understanding of the proper use and possible adverse effects of minocycline.  All of the patient's questions and concerns were addressed. Calcipotriene Pregnancy And Lactation Text: This medication has not been proven safe during pregnancy. It is unknown if this medication is excreted in breast milk. Zyclara Counseling:  I discussed with the patient the risks of imiquimod including but not limited to erythema, scaling, itching, weeping, crusting, and pain.  Patient understands that the inflammatory response to imiquimod is variable from person to person and was educated regarded proper titration schedule.  If flu-like symptoms develop, patient knows to discontinue the medication and contact us. SSKI Counseling:  I discussed with the patient the risks of SSKI including but not limited to thyroid abnormalities, metallic taste, GI upset, fever, headache, acne, arthralgias, paraesthesias, lymphadenopathy, easy bleeding, arrhythmias, and allergic reaction. Terbinafine Counseling: Patient counseling regarding adverse effects of terbinafine including but not limited to headache, diarrhea, rash, upset stomach, liver function test abnormalities, itching, taste/smell disturbance, nausea, abdominal pain, and flatulence.  There is a rare possibility of liver failure that can occur when taking terbinafine.  The patient understands that a baseline LFT and kidney function test may be required. The patient verbalized understanding of the proper use and possible adverse effects of terbinafine.  All of the patient's questions and concerns were addressed. Acitretin Counseling:  I discussed with the patient the risks of acitretin including but not limited to hair loss, dry lips/skin/eyes, liver damage, hyperlipidemia, depression/suicidal ideation, photosensitivity.  Serious rare side effects can include but are not limited to pancreatitis, pseudotumor cerebri, bony changes, clot formation/stroke/heart attack.  Patient understands that alcohol is contraindicated since it can result in liver toxicity and significantly prolong the elimination of the drug by many years. Terbinafine Pregnancy And Lactation Text: This medication is Pregnancy Category B and is considered safe during pregnancy. It is also excreted in breast milk and breast feeding isn't recommended. Aklief counseling:  Patient advised to apply a pea-sized amount only at bedtime and wait 30 minutes after washing their face before applying.  If too drying, patient may add a non-comedogenic moisturizer.  The most commonly reported side effects including irritation, redness, scaling, dryness, stinging, burning, itching, and increased risk of sunburn.  The patient verbalized understanding of the proper use and possible adverse effects of retinoids.  All of the patient's questions and concerns were addressed. Dupixent Counseling: I discussed with the patient the risks of dupilumab including but not limited to eye infection and irritation, cold sores, injection site reactions, worsening of asthma, allergic reactions and increased risk of parasitic infection.  Live vaccines should be avoided while taking dupilumab. Dupilumab will also interact with certain medications such as warfarin and cyclosporine. The patient understands that monitoring is required and they must alert us or the primary physician if symptoms of infection or other concerning signs are noted. Low Dose Naltrexone Counseling- I discussed with the patient the potential risks and side effects of low dose naltrexone including but not limited to: more vivid dreams, headaches, nausea, vomiting, abdominal pain, fatigue, dizziness, and anxiety. Albendazole Pregnancy And Lactation Text: This medication is Pregnancy Category C and it isn't known if it is safe during pregnancy. It is also excreted in breast milk. Olanzapine Pregnancy And Lactation Text: This medication is pregnancy category C.   There are no adequate and well controlled trials with olanzapine in pregnant females.  Olanzapine should be used during pregnancy only if the potential benefit justifies the potential risk to the fetus.   In a study in lactating healthy women, olanzapine was excreted in breast milk.  It is recommended that women taking olanzapine should not breast feed. Birth Control Pills Counseling: Birth Control Pill Counseling: I discussed with the patient the potential side effects of OCPs including but not limited to increased risk of stroke, heart attack, thrombophlebitis, deep venous thrombosis, hepatic adenomas, breast changes, GI upset, headaches, and depression.  The patient verbalized understanding of the proper use and possible adverse effects of OCPs. All of the patient's questions and concerns were addressed. Picato Counseling:  I discussed with the patient the risks of Picato including but not limited to erythema, scaling, itching, weeping, crusting, and pain. Rituxan Counseling:  I discussed with the patient the risks of Rituxan infusions. Side effects can include infusion reactions, severe drug rashes including mucocutaneous reactions, reactivation of latent hepatitis and other infections and rarely progressive multifocal leukoencephalopathy.  All of the patient's questions and concerns were addressed. Cyclophosphamide Pregnancy And Lactation Text: This medication is Pregnancy Category D and it isn't considered safe during pregnancy. This medication is excreted in breast milk. Bactrim Pregnancy And Lactation Text: This medication is Pregnancy Category D and is known to cause fetal risk.  It is also excreted in breast milk. Olumiant Counseling: I discussed with the patient the risks of Olumiant therapy including but not limited to upper respiratory tract infections, shingles, cold sores, and nausea. Live vaccines should be avoided.  This medication has been linked to serious infections; higher rate of mortality; malignancy and lymphoproliferative disorders; major adverse cardiovascular events; thrombosis; gastrointestinal perforations; neutropenia; lymphopenia; anemia; liver enzyme elevations; and lipid elevations. Fluconazole Counseling:  Patient counseled regarding adverse effects of fluconazole including but not limited to headache, diarrhea, nausea, upset stomach, liver function test abnormalities, taste disturbance, and stomach pain.  There is a rare possibility of liver failure that can occur when taking fluconazole.  The patient understands that monitoring of LFTs and kidney function test may be required, especially at baseline. The patient verbalized understanding of the proper use and possible adverse effects of fluconazole.  All of the patient's questions and concerns were addressed. Sski Pregnancy And Lactation Text: This medication is Pregnancy Category D and isn't considered safe during pregnancy. It is excreted in breast milk. Imiquimod Counseling:  I discussed with the patient the risks of imiquimod including but not limited to erythema, scaling, itching, weeping, crusting, and pain.  Patient understands that the inflammatory response to imiquimod is variable from person to person and was educated regarded proper titration schedule.  If flu-like symptoms develop, patient knows to discontinue the medication and contact us. Acitretin Pregnancy And Lactation Text: This medication is Pregnancy Category X and should not be given to women who are pregnant or may become pregnant in the future. This medication is excreted in breast milk. Aklief Pregnancy And Lactation Text: It is unknown if this medication is safe to use during pregnancy.  It is unknown if this medication is excreted in breast milk.  Breastfeeding women should use the topical cream on the smallest area of the skin for the shortest time needed while breastfeeding.  Do not apply to nipple and areola. Libtayo Counseling- I discussed with the patient the risks of Libtayo including but not limited to nausea, vomiting, diarrhea, and bone or muscle pain.  The patient verbalized understanding of the proper use and possible adverse effects of Libtayo.  All of the patient's questions and concerns were addressed. Taltz Counseling: I discussed with the patient the risks of ixekizumab including but not limited to immunosuppression, serious infections, worsening of inflammatory bowel disease and drug reactions.  The patient understands that monitoring is required including a PPD at baseline and must alert us or the primary physician if symptoms of infection or other concerning signs are noted. Topical Sulfur Applications Counseling: Topical Sulfur Counseling: Patient counseled that this medication may cause skin irritation or allergic reactions.  In the event of skin irritation, the patient was advised to reduce the amount of the drug applied or use it less frequently.   The patient verbalized understanding of the proper use and possible adverse effects of topical sulfur application.  All of the patient's questions and concerns were addressed. Birth Control Pills Pregnancy And Lactation Text: This medication should be avoided if pregnant and for the first 30 days post-partum. Oral Minoxidil Counseling- I discussed with the patient the risks of oral minoxidil including but not limited to shortness of breath, swelling of the feet or ankles, dizziness, lightheadedness, unwanted hair growth and allergic reaction.  The patient verbalized understanding of the proper use and possible adverse effects of oral minoxidil.  All of the patient's questions and concerns were addressed. Quinolones Counseling:  I discussed with the patient the risks of fluoroquinolones including but not limited to GI upset, allergic reaction, drug rash, diarrhea, dizziness, photosensitivity, yeast infections, liver function test abnormalities, tendonitis/tendon rupture. Opzelura Pregnancy And Lactation Text: There is insufficient data to evaluate drug-associated risk for major birth defects, miscarriage, or other adverse maternal or fetal outcomes.  There is a pregnancy registry that monitors pregnancy outcomes in pregnant persons exposed to the medication during pregnancy.  It is unknown if this medication is excreted in breast milk.  Do not breastfeed during treatment and for about 4 weeks after the last dose. Solaraze Pregnancy And Lactation Text: This medication is Pregnancy Category B and is considered safe. There is some data to suggest avoiding during the third trimester. It is unknown if this medication is excreted in breast milk. Cyclosporine Counseling:  I discussed with the patient the risks of cyclosporine including but not limited to hypertension, gingival hyperplasia,myelosuppression, immunosuppression, liver damage, kidney damage, neurotoxicity, lymphoma, and serious infections. The patient understands that monitoring is required including baseline blood pressure, CBC, CMP, lipid panel and uric acid, and then 1-2 times monthly CMP and blood pressure. Dupixent Pregnancy And Lactation Text: This medication likely crosses the placenta but the risk for the fetus is uncertain. This medication is excreted in breast milk. Cephalexin Pregnancy And Lactation Text: This medication is Pregnancy Category B and considered safe during pregnancy.  It is also excreted in breast milk but can be used safely for shorter doses. Dapsone Counseling: I discussed with the patient the risks of dapsone including but not limited to hemolytic anemia, agranulocytosis, rashes, methemoglobinemia, kidney failure, peripheral neuropathy, headaches, GI upset, and liver toxicity.  Patients who start dapsone require monitoring including baseline LFTs and weekly CBCs for the first month, then every month thereafter.  The patient verbalized understanding of the proper use and possible adverse effects of dapsone.  All of the patient's questions and concerns were addressed. Ivermectin Counseling:  Patient instructed to take medication on an empty stomach with a full glass of water.  Patient informed of potential adverse effects including but not limited to nausea, diarrhea, dizziness, itching, and swelling of the extremities or lymph nodes.  The patient verbalized understanding of the proper use and possible adverse effects of ivermectin.  All of the patient's questions and concerns were addressed. Solaraze Counseling:  I discussed with the patient the risks of Solaraze including but not limited to erythema, scaling, itching, weeping, crusting, and pain. Enbrel Counseling:  I discussed with the patient the risks of etanercept including but not limited to myelosuppression, immunosuppression, autoimmune hepatitis, demyelinating diseases, lymphoma, and infections.  The patient understands that monitoring is required including a PPD at baseline and must alert us or the primary physician if symptoms of infection or other concerning signs are noted. Olumiant Pregnancy And Lactation Text: Based on animal studies, Olumiant may cause embryo-fetal harm when administered to pregnant women.  The medication should not be used in pregnancy.  Breastfeeding is not recommended during treatment. Thalidomide Counseling: I discussed with the patient the risks of thalidomide including but not limited to birth defects, anxiety, weakness, chest pain, dizziness, cough and severe allergy. Cephalexin Counseling: I counseled the patient regarding use of cephalexin as an antibiotic for prophylactic and/or therapeutic purposes. Cephalexin (commonly prescribed under brand name Keflex) is a cephalosporin antibiotic which is active against numerous classes of bacteria, including most skin bacteria. Side effects may include nausea, diarrhea, gastrointestinal upset, rash, hives, yeast infections, and in rare cases, hepatitis, kidney disease, seizures, fever, confusion, neurologic symptoms, and others. Patients with severe allergies to penicillin medications are cautioned that there is about a 10% incidence of cross-reactivity with cephalosporins. When possible, patients with penicillin allergies should use alternatives to cephalosporins for antibiotic therapy. Bexarotene Counseling:  I discussed with the patient the risks of bexarotene including but not limited to hair loss, dry lips/skin/eyes, liver abnormalities, hyperlipidemia, pancreatitis, depression/suicidal ideation, photosensitivity, drug rash/allergic reactions, hypothyroidism, anemia, leukopenia, infection, cataracts, and teratogenicity.  Patient understands that they will need regular blood tests to check lipid profile, liver function tests, white blood cell count, thyroid function tests and pregnancy test if applicable. Libtayo Pregnancy And Lactation Text: This medication is contraindicated in pregnancy and when breast feeding. Cantharidin Pregnancy And Lactation Text: The use of this medication during pregnancy or lactation is not recommended as there is insufficient data. Rituxan Pregnancy And Lactation Text: This medication is Pregnancy Category C and it isn't know if it is safe during pregnancy. It is unknown if this medication is excreted in breast milk but similar antibodies are known to be excreted. Hydroquinone Pregnancy And Lactation Text: This medication has not been assigned a Pregnancy Risk Category but animal studies failed to show danger with the topical medication. It is unknown if the medication is excreted in breast milk. Cimetidine Counseling:  I discussed with the patient the risks of Cimetidine including but not limited to gynecomastia, headache, diarrhea, nausea, drowsiness, arrhythmias, pancreatitis, skin rashes, psychosis, bone marrow suppression and kidney toxicity. Topical Sulfur Applications Pregnancy And Lactation Text: This medication is Pregnancy Category C and has an unknown safety profile during pregnancy. It is unknown if this topical medication is excreted in breast milk. Oral Minoxidil Pregnancy And Lactation Text: This medication should only be used when clearly needed if you are pregnant, attempting to become pregnant or breast feeding. Spironolactone Counseling: Patient advised regarding risks of diarrhea, abdominal pain, hyperkalemia, birth defects (for female patients), liver toxicity and renal toxicity. The patient may need blood work to monitor liver and kidney function and potassium levels while on therapy. The patient verbalized understanding of the proper use and possible adverse effects of spironolactone.  All of the patient's questions and concerns were addressed. Fluconazole Pregnancy And Lactation Text: This medication is Pregnancy Category C and it isn't know if it is safe during pregnancy. It is also excreted in breast milk. Opioid Counseling: I discussed with the patient the potential side effects of opioids including but not limited to addiction, altered mental status, and depression. I stressed avoiding alcohol, benzodiazepines, muscle relaxants and sleep aids unless specifically okayed by a physician. The patient verbalized understanding of the proper use and possible adverse effects of opioids. All of the patient's questions and concerns were addressed. They were instructed to flush the remaining pills down the toilet if they did not need them for pain. Opzelura Counseling:  I discussed with the patient the risks of Opzelura including but not limited to nasopharngitis, bronchitis, ear infection, eosinophila, hives, diarrhea, folliculitis, tonsillitis, and rhinorrhea.  Taken orally, this medication has been linked to serious infections; higher rate of mortality; malignancy and lymphoproliferative disorders; major adverse cardiovascular events; thrombosis; thrombocytopenia, anemia, and neutropenia; and lipid elevations. Azelaic Acid Counseling: Patient counseled that medicine may cause skin irritation and to avoid applying near the eyes.  In the event of skin irritation, the patient was advised to reduce the amount of the drug applied or use it less frequently.   The patient verbalized understanding of the proper use and possible adverse effects of azelaic acid.  All of the patient's questions and concerns were addressed. Griseofulvin Counseling:  I discussed with the patient the risks of griseofulvin including but not limited to photosensitivity, cytopenia, liver damage, nausea/vomiting and severe allergy.  The patient understands that this medication is best absorbed when taken with a fatty meal (e.g., ice cream or french fries). Dapsone Pregnancy And Lactation Text: This medication is Pregnancy Category C and is not considered safe during pregnancy or breast feeding. Clindamycin Counseling: I counseled the patient regarding use of clindamycin as an antibiotic for prophylactic and/or therapeutic purposes. Clindamycin is active against numerous classes of bacteria, including skin bacteria. Side effects may include nausea, diarrhea, gastrointestinal upset, rash, hives, yeast infections, and in rare cases, colitis. Topical Retinoid counseling:  Patient advised to apply a pea-sized amount only at bedtime and wait 30 minutes after washing their face before applying.  If too drying, patient may add a non-comedogenic moisturizer. The patient verbalized understanding of the proper use and possible adverse effects of retinoids.  All of the patient's questions and concerns were addressed. Rhofade Pregnancy And Lactation Text: This medication has not been assigned a Pregnancy Risk Category. It is unknown if the medication is excreted in breast milk. 5-Fu Counseling: 5-Fluorouracil Counseling:  I discussed with the patient the risks of 5-fluorouracil including but not limited to erythema, scaling, itching, weeping, crusting, and pain. Rinvoq Counseling: I discussed with the patient the risks of Rinvoq therapy including but not limited to upper respiratory tract infections, shingles, cold sores, bronchitis, nausea, cough, fever, acne, and headache. Live vaccines should be avoided.  This medication has been linked to serious infections; higher rate of mortality; malignancy and lymphoproliferative disorders; major adverse cardiovascular events; thrombosis; thrombocytopenia, anemia, and neutropenia; lipid elevations; liver enzyme elevations; and gastrointestinal perforations. Odomzo Counseling- I discussed with the patient the risks of Odomzo including but not limited to nausea, vomiting, diarrhea, constipation, weight loss, changes in the sense of taste, decreased appetite, muscle spasms, and hair loss.  The patient verbalized understanding of the proper use and possible adverse effects of Odomzo.  All of the patient's questions and concerns were addressed. Bexarotene Pregnancy And Lactation Text: This medication is Pregnancy Category X and should not be given to women who are pregnant or may become pregnant. This medication should not be used if you are breast feeding. Detail Level: Zone Hydroquinone Counseling:  Patient advised that medication may result in skin irritation, lightening (hypopigmentation), dryness, and burning.  In the event of skin irritation, the patient was advised to reduce the amount of the drug applied or use it less frequently.  Rarely, spots that are treated with hydroquinone can become darker (pseudoochronosis).  Should this occur, patient instructed to stop medication and call the office. The patient verbalized understanding of the proper use and possible adverse effects of hydroquinone.  All of the patient's questions and concerns were addressed. Wartpeel Counseling:  I discussed with the patient the risks of Wartpeel including but not limited to erythema, scaling, itching, weeping, crusting, and pain. Otezla Counseling: The side effects of Otezla were discussed with the patient, including but not limited to worsening or new depression, weight loss, diarrhea, nausea, upper respiratory tract infection, and headache. Patient instructed to call the office should any adverse effect occur.  The patient verbalized understanding of the proper use and possible adverse effects of Otezla.  All the patient's questions and concerns were addressed. Opioid Pregnancy And Lactation Text: These medications can lead to premature delivery and should be avoided during pregnancy. These medications are also present in breast milk in small amounts. Spironolactone Pregnancy And Lactation Text: This medication can cause feminization of the male fetus and should be avoided during pregnancy. The active metabolite is also found in breast milk. Siliq Counseling:  I discussed with the patient the risks of Siliq including but not limited to new or worsening depression, suicidal thoughts and behavior, immunosuppression, malignancy, posterior leukoencephalopathy syndrome, and serious infections.  The patient understands that monitoring is required including a PPD at baseline and must alert us or the primary physician if symptoms of infection or other concerning signs are noted. There is also a special program designed to monitor depression which is required with Siliq. Doxepin Counseling:  Patient advised that the medication is sedating and not to drive a car after taking this medication. Patient informed of potential adverse effects including but not limited to dry mouth, urinary retention, and blurry vision.  The patient verbalized understanding of the proper use and possible adverse effects of doxepin.  All of the patient's questions and concerns were addressed. Gabapentin Counseling: I discussed with the patient the risks of gabapentin including but not limited to dizziness, somnolence, fatigue and ataxia. Methotrexate Counseling:  Patient counseled regarding adverse effects of methotrexate including but not limited to nausea, vomiting, abnormalities in liver function tests. Patients may develop mouth sores, rash, diarrhea, and abnormalities in blood counts. The patient understands that monitoring is required including LFT's and blood counts.  There is a rare possibility of scarring of the liver and lung problems that can occur when taking methotrexate. Persistent nausea, loss of appetite, pale stools, dark urine, cough, and shortness of breath should be reported immediately. Patient advised to discontinue methotrexate treatment at least three months before attempting to become pregnant.  I discussed the need for folate supplements while taking methotrexate.  These supplements can decrease side effects during methotrexate treatment. The patient verbalized understanding of the proper use and possible adverse effects of methotrexate.  All of the patient's questions and concerns were addressed. Clindamycin Pregnancy And Lactation Text: This medication can be used in pregnancy if certain situations. Clindamycin is also present in breast milk. Low Dose Naltrexone Pregnancy And Lactation Text: Naltrexone is pregnancy category C.  There have been no adequate and well-controlled studies in pregnant women.  It should be used in pregnancy only if the potential benefit justifies the potential risk to the fetus.   Limited data indicates that naltrexone is minimally excreted into breastmilk. Rhofade Counseling: Rhofade is a topical medication which can decrease superficial blood flow where applied. Side effects are uncommon and include stinging, redness and allergic reactions. Griseofulvin Pregnancy And Lactation Text: This medication is Pregnancy Category X and is known to cause serious birth defects. It is unknown if this medication is excreted in breast milk but breast feeding should be avoided. Rifampin Counseling: I discussed with the patient the risks of rifampin including but not limited to liver damage, kidney damage, red-orange body fluids, nausea/vomiting and severe allergy. Rinvoq Pregnancy And Lactation Text: Based on animal studies, Rinvoq may cause embryo-fetal harm when administered to pregnant women.  The medication should not be used in pregnancy.  Breastfeeding is not recommended during treatment and for 6 days after the last dose. Adbry Counseling: I discussed with the patient the risks of tralokinumab including but not limited to eye infection and irritation, cold sores, injection site reactions, worsening of asthma, allergic reactions and increased risk of parasitic infection.  Live vaccines should be avoided while taking tralokinumab. The patient understands that monitoring is required and they must alert us or the primary physician if symptoms of infection or other concerning signs are noted. Isotretinoin Counseling: Patient should get monthly blood tests, not donate blood, not drive at night if vision affected, not share medication, and not undergo elective surgery for 6 months after tx completed. Side effects reviewed, pt to contact office should one occur. Tranexamic Acid Counseling:  Patient advised of the small risk of bleeding problems with tranexamic acid. They were also instructed to call if they developed any nausea, vomiting or diarrhea. All of the patient's questions and concerns were addressed. Rifampin Pregnancy And Lactation Text: This medication is Pregnancy Category C and it isn't know if it is safe during pregnancy. It is also excreted in breast milk and should not be used if you are breast feeding. Adbry Pregnancy And Lactation Text: It is unknown if this medication will adversely affect pregnancy or breast feeding. Otezla Pregnancy And Lactation Text: This medication is Pregnancy Category C and it isn't known if it is safe during pregnancy. It is unknown if it is excreted in breast milk. Humira Counseling:  I discussed with the patient the risks of adalimumab including but not limited to myelosuppression, immunosuppression, autoimmune hepatitis, demyelinating diseases, lymphoma, and serious infections.  The patient understands that monitoring is required including a PPD at baseline and must alert us or the primary physician if symptoms of infection or other concerning signs are noted. Benzoyl Peroxide Counseling: Patient counseled that medicine may cause skin irritation and bleach clothing.  In the event of skin irritation, the patient was advised to reduce the amount of the drug applied or use it less frequently.   The patient verbalized understanding of the proper use and possible adverse effects of benzoyl peroxide.  All of the patient's questions and concerns were addressed. Tremfya Counseling: I discussed with the patient the risks of guselkumab including but not limited to immunosuppression, serious infections, worsening of inflammatory bowel disease and drug reactions.  The patient understands that monitoring is required including a PPD at baseline and must alert us or the primary physician if symptoms of infection or other concerning signs are noted. Mirvaso Counseling: Mirvaso is a topical medication which can decrease superficial blood flow where applied. Side effects are uncommon and include stinging, redness and allergic reactions. Methotrexate Pregnancy And Lactation Text: This medication is Pregnancy Category X and is known to cause fetal harm. This medication is excreted in breast milk. Tazorac Counseling:  Patient advised that medication is irritating and drying.  Patient may need to apply sparingly and wash off after an hour before eventually leaving it on overnight.  The patient verbalized understanding of the proper use and possible adverse effects of tazorac.  All of the patient's questions and concerns were addressed. Niacinamide Counseling: I recommended taking niacin or niacinamide, also know as vitamin B3, twice daily. Recent evidence suggests that taking vitamin B3 (500 mg twice daily) can reduce the risk of actinic keratoses and non-melanoma skin cancers. Side effects of vitamin B3 include flushing and headache. Qbrexza Pregnancy And Lactation Text: There is no available data on Qbrexza use in pregnant women.  There is no available data on Qbrexza use in lactation. Sotyktu Counseling:  I discussed the most common side effects of Sotyktu including: common cold, sore throat, sinus infections, cold sores, canker sores, folliculitis, and acne.  I also discussed more serious side effects of Sotyktu including but not limited to: serious allergic reactions; increased risk for infections such as TB; cancers such as lymphomas; rhabdomyolysis and elevated CPK; and elevated triglycerides and liver enzymes.  Doxepin Pregnancy And Lactation Text: This medication is Pregnancy Category C and it isn't known if it is safe during pregnancy. It is also excreted in breast milk and breast feeding isn't recommended. Simponi Counseling:  I discussed with the patient the risks of golimumab including but not limited to myelosuppression, immunosuppression, autoimmune hepatitis, demyelinating diseases, lymphoma, and serious infections.  The patient understands that monitoring is required including a PPD at baseline and must alert us or the primary physician if symptoms of infection or other concerning signs are noted. Arava Counseling:  Patient counseled regarding adverse effects of Arava including but not limited to nausea, vomiting, abnormalities in liver function tests. Patients may develop mouth sores, rash, diarrhea, and abnormalities in blood counts. The patient understands that monitoring is required including LFTs and blood counts.  There is a rare possibility of scarring of the liver and lung problems that can occur when taking methotrexate. Persistent nausea, loss of appetite, pale stools, dark urine, cough, and shortness of breath should be reported immediately. Patient advised to discontinue Arava treatment and consult with a physician prior to attempting conception. The patient will have to undergo a treatment to eliminate Arava from the body prior to conception. Tranexamic Acid Pregnancy And Lactation Text: It is unknown if this medication is safe during pregnancy or breast feeding. Azathioprine Counseling:  I discussed with the patient the risks of azathioprine including but not limited to myelosuppression, immunosuppression, hepatotoxicity, lymphoma, and infections.  The patient understands that monitoring is required including baseline LFTs, Creatinine, possible TPMP genotyping and weekly CBCs for the first month and then every 2 weeks thereafter.  The patient verbalized understanding of the proper use and possible adverse effects of azathioprine.  All of the patient's questions and concerns were addressed. Eucrisa Counseling: Patient may experience a mild burning sensation during topical application. Eucrisa is not approved in children less than 2 years of age. Isotretinoin Pregnancy And Lactation Text: This medication is Pregnancy Category X and is considered extremely dangerous during pregnancy. It is unknown if it is excreted in breast milk. Benzoyl Peroxide Pregnancy And Lactation Text: This medication is Pregnancy Category C. It is unknown if benzoyl peroxide is excreted in breast milk. Winlevi Counseling:  I discussed with the patient the risks of topical clascoterone including but not limited to erythema, scaling, itching, and stinging. Patient voiced their understanding. Itraconazole Counseling:  I discussed with the patient the risks of itraconazole including but not limited to liver damage, nausea/vomiting, neuropathy, and severe allergy.  The patient understands that this medication is best absorbed when taken with acidic beverages such as non-diet cola or ginger ale.  The patient understands that monitoring is required including baseline LFTs and repeat LFTs at intervals.  The patient understands that they are to contact us or the primary physician if concerning signs are noted. Oxybutynin Counseling:  I discussed with the patient the risks of oxybutynin including but not limited to skin rash, drowsiness, dry mouth, difficulty urinating, and blurred vision. Prednisone Counseling:  I discussed with the patient the risks of prolonged use of prednisone including but not limited to weight gain, insomnia, osteoporosis, mood changes, diabetes, susceptibility to infection, glaucoma and high blood pressure.  In cases where prednisone use is prolonged, patients should be monitored with blood pressure checks, serum glucose levels and an eye exam.  Additionally, the patient may need to be placed on GI prophylaxis, PCP prophylaxis, and calcium and vitamin D supplementation and/or a bisphosphonate.  The patient verbalized understanding of the proper use and the possible adverse effects of prednisone.  All of the patient's questions and concerns were addressed. Cimzia Counseling:  I discussed with the patient the risks of Cimzia including but not limited to immunosuppression, allergic reactions and infections.  The patient understands that monitoring is required including a PPD at baseline and must alert us or the primary physician if symptoms of infection or other concerning signs are noted. Sarecycline Counseling: Patient advised regarding possible photosensitivity and discoloration of the teeth, skin, lips, tongue and gums.  Patient instructed to avoid sunlight, if possible.  When exposed to sunlight, patients should wear protective clothing, sunglasses, and sunscreen.  The patient was instructed to call the office immediately if the following severe adverse effects occur:  hearing changes, easy bruising/bleeding, severe headache, or vision changes.  The patient verbalized understanding of the proper use and possible adverse effects of sarecycline.  All of the patient's questions and concerns were addressed. Glycopyrrolate Counseling:  I discussed with the patient the risks of glycopyrrolate including but not limited to skin rash, drowsiness, dry mouth, difficulty urinating, and blurred vision. Tazorac Pregnancy And Lactation Text: This medication is not safe during pregnancy. It is unknown if this medication is excreted in breast milk. Niacinamide Pregnancy And Lactation Text: These medications are considered safe during pregnancy. Dutasteride Counseling: Dustasteride Counseling:  I discussed with the patient the risks of use of dutasteride including but not limited to decreased libido, decreased ejaculate volume, and gynecomastia. Women who can become pregnant should not handle medication.  All of the patient's questions and concerns were addressed. Qbrexza Counseling:  I discussed with the patient the risks of Qbrexza including but not limited to headache, mydriasis, blurred vision, dry eyes, nasal dryness, dry mouth, dry throat, dry skin, urinary hesitation, and constipation.  Local skin reactions including erythema, burning, stinging, and itching can also occur. Sotyktu Pregnancy And Lactation Text: There is insufficient data to evaluate whether or not Sotyktu is safe to use during pregnancy.   It is not known if Sotyktu passes into breast milk and whether or not it is safe to use when breastfeeding.   Doxycycline Counseling:  Patient counseled regarding possible photosensitivity and increased risk for sunburn.  Patient instructed to avoid sunlight, if possible.  When exposed to sunlight, patients should wear protective clothing, sunglasses, and sunscreen.  The patient was instructed to call the office immediately if the following severe adverse effects occur:  hearing changes, easy bruising/bleeding, severe headache, or vision changes.  The patient verbalized understanding of the proper use and possible adverse effects of doxycycline.  All of the patient's questions and concerns were addressed. Xolair Counseling:  Patient informed of potential adverse effects including but not limited to fever, muscle aches, rash and allergic reactions.  The patient verbalized understanding of the proper use and possible adverse effects of Xolair.  All of the patient's questions and concerns were addressed.

## 2025-01-05 NOTE — ED ADULT NURSE NOTE - NSFALLHARMRISKINTERV_ED_ALL_ED
Assistance OOB with selected safe patient handling equipment if applicable/Assistance with ambulation/Communicate risk of Fall with Harm to all staff, patient, and family/Monitor gait and stability/Provide visual cue: red socks, yellow wristband, yellow gown, etc/Reinforce activity limits and safety measures with patient and family/Use of alarms - bed, stretcher, chair and/or video monitoring/Bed in lowest position, wheels locked, appropriate side rails in place/Call bell, personal items and telephone in reach/Instruct patient to call for assistance before getting out of bed/chair/stretcher/Non-slip footwear applied when patient is off stretcher/Eads to call system/Physically safe environment - no spills, clutter or unnecessary equipment/Purposeful Proactive Rounding/Room/bathroom lighting operational, light cord in reach

## 2025-01-05 NOTE — ED PROVIDER NOTE - PHYSICAL EXAMINATION
CONSTITUTIONAL: well-appearing, well nourished, non-toxic, NAD  HEAD: NCAT  EYES: EOMI, no scleral icterus  ENT: Dry mucous membranes  NECK: Full ROM.   ABD: firm, non-tender,  EXT: Full ROM  NEURO: unable to move b/l LEs (baseline from polio), b/l UE strength intact, sensations intact  PSYCH: Cooperative, appropriate. CONSTITUTIONAL: well-appearing, well nourished, non-toxic, NAD  HEAD: NCAT  EYES: EOMI, no scleral icterus  ENT: Dry mucous membranes  NECK: Full ROM.   ABD: firm, non-tender  EXT: Full ROM  RECTAL: Deferred  NEURO: unable to move b/l LEs (baseline from polio), b/l UE strength intact, sensations intact  PSYCH: Cooperative, appropriate.

## 2025-01-05 NOTE — ED PROVIDER NOTE - NSFOLLOWUPINSTRUCTIONS_ED_ALL_ED_FT
Continue with the OTC laxatives, suppositories and hydration    Constipation, Adult  Constipation is when a person has fewer than three bowel movements in a week, has difficulty having a bowel movement, or has stools (feces) that are dry, hard, or larger than normal. Constipation may be caused by an underlying condition. It may become worse with age if a person takes certain medicines and does not take in enough fluids.    Follow these instructions at home:  Eating and drinking      Eat foods that have a lot of fiber, such as beans, whole grains, and fresh fruits and vegetables.  Limit foods that are low in fiber and high in fat and processed sugars, such as fried or sweet foods. These include french fries, hamburgers, cookies, candies, and soda.  Drink enough fluid to keep your urine pale yellow.  General instructions    Exercise regularly or as told by your health care provider. Try to do 150 minutes of moderate exercise each week.  Use the bathroom when you have the urge to go. Do not hold it in.  Take over-the-counter and prescription medicines only as told by your health care provider. This includes any fiber supplements.  During bowel movements:  Practice deep breathing while relaxing the lower abdomen.  Practice pelvic floor relaxation.  Watch your condition for any changes. Let your health care provider know about them.  Keep all follow-up visits as told by your health care provider. This is important.  Contact a health care provider if:  You have pain that gets worse.  You have a fever.  You do not have a bowel movement after 4 days.  You vomit.  You are not hungry or you lose weight.  You are bleeding from the opening between the buttocks (anus).  You have thin, pencil-like stools.  Get help right away if:  You have a fever and your symptoms suddenly get worse.  You leak stool or have blood in your stool.  Your abdomen is bloated.  You have severe pain in your abdomen.  You feel dizzy or you faint.  Summary  Constipation is when a person has fewer than three bowel movements in a week, has difficulty having a bowel movement, or has stools (feces) that are dry, hard, or larger than normal.  Eat foods that have a lot of fiber, such as beans, whole grains, and fresh fruits and vegetables.  Drink enough fluid to keep your urine pale yellow.  Take over-the-counter and prescription medicines only as told by your health care provider. This includes any fiber supplements.  This information is not intended to replace advice given to you by your health care provider. Make sure you discuss any questions you have with your health care provider.

## 2025-01-05 NOTE — ED PROVIDER NOTE - PATIENT PORTAL LINK FT
You can access the FollowMyHealth Patient Portal offered by [facility] by registering at the following website: [email or web address]. By joining [facility]’s FollowMyHealth portal, you will also be able to view your health information using other applications (apps) compatible with our system.

## 2025-01-16 ENCOUNTER — NON-APPOINTMENT (OUTPATIENT)
Age: 85
End: 2025-01-16

## 2025-01-24 DIAGNOSIS — N39.0 URINARY TRACT INFECTION, SITE NOT SPECIFIED: ICD-10-CM

## 2025-01-24 RX ORDER — CEFPODOXIME PROXETIL 100 MG/1
100 TABLET, FILM COATED ORAL
Qty: 10 | Refills: 0 | Status: ACTIVE | COMMUNITY
Start: 2025-01-24 | End: 1900-01-01

## 2025-02-03 ENCOUNTER — NON-APPOINTMENT (OUTPATIENT)
Age: 85
End: 2025-02-03

## 2025-02-03 ENCOUNTER — APPOINTMENT (OUTPATIENT)
Dept: GERIATRICS | Facility: HOME HEALTH | Age: 85
End: 2025-02-03

## 2025-02-03 PROCEDURE — 99202 OFFICE O/P NEW SF 15 MIN: CPT | Mod: 93

## 2025-02-12 ENCOUNTER — APPOINTMENT (OUTPATIENT)
Dept: GERIATRICS | Facility: HOME HEALTH | Age: 85
End: 2025-02-12
Payer: MEDICARE

## 2025-02-12 VITALS
DIASTOLIC BLOOD PRESSURE: 76 MMHG | OXYGEN SATURATION: 97 % | TEMPERATURE: 98.2 F | HEART RATE: 80 BPM | SYSTOLIC BLOOD PRESSURE: 120 MMHG | RESPIRATION RATE: 16 BRPM

## 2025-02-12 DIAGNOSIS — I48.91 UNSPECIFIED ATRIAL FIBRILLATION: ICD-10-CM

## 2025-02-12 DIAGNOSIS — K59.00 CONSTIPATION, UNSPECIFIED: ICD-10-CM

## 2025-02-12 DIAGNOSIS — G14 POSTPOLIO SYNDROME: ICD-10-CM

## 2025-02-12 DIAGNOSIS — F03.90 UNSPECIFIED DEMENTIA W/OUT BEHAVIORAL DISTURBANCE: ICD-10-CM

## 2025-02-12 DIAGNOSIS — I10 ESSENTIAL (PRIMARY) HYPERTENSION: ICD-10-CM

## 2025-02-12 DIAGNOSIS — I50.30 UNSPECIFIED DIASTOLIC (CONGESTIVE) HEART FAILURE: ICD-10-CM

## 2025-02-12 PROCEDURE — 99349 HOME/RES VST EST MOD MDM 40: CPT

## 2025-03-31 ENCOUNTER — APPOINTMENT (OUTPATIENT)
Dept: GERIATRICS | Facility: HOME HEALTH | Age: 85
End: 2025-03-31
Payer: MEDICARE

## 2025-03-31 VITALS
HEART RATE: 62 BPM | TEMPERATURE: 97.8 F | DIASTOLIC BLOOD PRESSURE: 64 MMHG | RESPIRATION RATE: 18 BRPM | SYSTOLIC BLOOD PRESSURE: 116 MMHG | OXYGEN SATURATION: 98 %

## 2025-03-31 DIAGNOSIS — I50.30 UNSPECIFIED DIASTOLIC (CONGESTIVE) HEART FAILURE: ICD-10-CM

## 2025-03-31 PROCEDURE — 99349 HOME/RES VST EST MOD MDM 40: CPT

## 2025-05-01 ENCOUNTER — APPOINTMENT (OUTPATIENT)
Dept: GERIATRICS | Facility: HOME HEALTH | Age: 85
End: 2025-05-01
Payer: MEDICARE

## 2025-05-01 ENCOUNTER — NON-APPOINTMENT (OUTPATIENT)
Age: 85
End: 2025-05-01

## 2025-05-01 PROCEDURE — 99212 OFFICE O/P EST SF 10 MIN: CPT | Mod: 93

## 2025-05-01 RX ORDER — SULFAMETHOXAZOLE AND TRIMETHOPRIM 800; 160 MG/1; MG/1
800-160 TABLET ORAL TWICE DAILY
Qty: 10 | Refills: 0 | Status: ACTIVE | COMMUNITY
Start: 2025-05-01 | End: 1900-01-01

## 2025-05-05 ENCOUNTER — APPOINTMENT (OUTPATIENT)
Dept: GERIATRICS | Facility: HOME HEALTH | Age: 85
End: 2025-05-05
Payer: MEDICARE

## 2025-05-05 DIAGNOSIS — N39.0 URINARY TRACT INFECTION, SITE NOT SPECIFIED: ICD-10-CM

## 2025-05-05 DIAGNOSIS — R41.0 DISORIENTATION, UNSPECIFIED: ICD-10-CM

## 2025-05-05 PROCEDURE — 99212 OFFICE O/P EST SF 10 MIN: CPT | Mod: 93

## 2025-05-08 ENCOUNTER — NON-APPOINTMENT (OUTPATIENT)
Age: 85
End: 2025-05-08

## 2025-05-09 ENCOUNTER — NON-APPOINTMENT (OUTPATIENT)
Age: 85
End: 2025-05-09

## 2025-05-09 DIAGNOSIS — E87.1 HYPO-OSMOLALITY AND HYPONATREMIA: ICD-10-CM

## 2025-05-19 ENCOUNTER — NON-APPOINTMENT (OUTPATIENT)
Age: 85
End: 2025-05-19

## 2025-07-16 ENCOUNTER — RX RENEWAL (OUTPATIENT)
Age: 85
End: 2025-07-16

## 2025-08-21 ENCOUNTER — NON-APPOINTMENT (OUTPATIENT)
Age: 85
End: 2025-08-21

## 2025-08-21 DIAGNOSIS — M79.605 PAIN IN LEFT LEG: ICD-10-CM

## 2025-08-21 DIAGNOSIS — M25.562 PAIN IN LEFT KNEE: ICD-10-CM

## 2025-08-22 RX ORDER — DICLOFENAC SODIUM 10 MG/G
1 GEL TOPICAL DAILY
Qty: 1 | Refills: 0 | Status: ACTIVE | COMMUNITY
Start: 2025-08-21 | End: 1900-01-01

## 2025-08-24 ENCOUNTER — INPATIENT (INPATIENT)
Facility: HOSPITAL | Age: 85
LOS: 2 days | Discharge: HOME HEALTH | DRG: 690 | End: 2025-08-27
Attending: STUDENT IN AN ORGANIZED HEALTH CARE EDUCATION/TRAINING PROGRAM | Admitting: INTERNAL MEDICINE
Payer: MEDICARE

## 2025-08-24 VITALS
RESPIRATION RATE: 18 BRPM | OXYGEN SATURATION: 95 % | HEART RATE: 63 BPM | SYSTOLIC BLOOD PRESSURE: 144 MMHG | TEMPERATURE: 98 F | DIASTOLIC BLOOD PRESSURE: 85 MMHG

## 2025-08-24 DIAGNOSIS — E53.8 DEFICIENCY OF OTHER SPECIFIED B GROUP VITAMINS: ICD-10-CM

## 2025-08-24 DIAGNOSIS — E87.1 HYPO-OSMOLALITY AND HYPONATREMIA: ICD-10-CM

## 2025-08-24 DIAGNOSIS — Z79.899 OTHER LONG TERM (CURRENT) DRUG THERAPY: ICD-10-CM

## 2025-08-24 DIAGNOSIS — N39.0 URINARY TRACT INFECTION, SITE NOT SPECIFIED: ICD-10-CM

## 2025-08-24 DIAGNOSIS — I48.91 UNSPECIFIED ATRIAL FIBRILLATION: ICD-10-CM

## 2025-08-24 DIAGNOSIS — G93.41 METABOLIC ENCEPHALOPATHY: ICD-10-CM

## 2025-08-24 DIAGNOSIS — Z88.0 ALLERGY STATUS TO PENICILLIN: ICD-10-CM

## 2025-08-24 DIAGNOSIS — Z79.01 LONG TERM (CURRENT) USE OF ANTICOAGULANTS: ICD-10-CM

## 2025-08-24 DIAGNOSIS — Z79.02 LONG TERM (CURRENT) USE OF ANTITHROMBOTICS/ANTIPLATELETS: ICD-10-CM

## 2025-08-24 DIAGNOSIS — Z86.12 PERSONAL HISTORY OF POLIOMYELITIS: ICD-10-CM

## 2025-08-24 DIAGNOSIS — Z66 DO NOT RESUSCITATE: ICD-10-CM

## 2025-08-24 DIAGNOSIS — E87.6 HYPOKALEMIA: ICD-10-CM

## 2025-08-24 DIAGNOSIS — Z79.82 LONG TERM (CURRENT) USE OF ASPIRIN: ICD-10-CM

## 2025-08-24 DIAGNOSIS — K59.00 CONSTIPATION, UNSPECIFIED: ICD-10-CM

## 2025-08-24 DIAGNOSIS — R63.8 OTHER SYMPTOMS AND SIGNS CONCERNING FOOD AND FLUID INTAKE: ICD-10-CM

## 2025-08-24 DIAGNOSIS — Z74.01 BED CONFINEMENT STATUS: ICD-10-CM

## 2025-08-24 DIAGNOSIS — E78.5 HYPERLIPIDEMIA, UNSPECIFIED: ICD-10-CM

## 2025-08-24 DIAGNOSIS — R53.2 FUNCTIONAL QUADRIPLEGIA: ICD-10-CM

## 2025-08-24 DIAGNOSIS — I10 ESSENTIAL (PRIMARY) HYPERTENSION: ICD-10-CM

## 2025-08-24 DIAGNOSIS — Z88.8 ALLERGY STATUS TO OTHER DRUGS, MEDICAMENTS AND BIOLOGICAL SUBSTANCES: ICD-10-CM

## 2025-08-24 DIAGNOSIS — Z87.440 PERSONAL HISTORY OF URINARY (TRACT) INFECTIONS: ICD-10-CM

## 2025-08-24 DIAGNOSIS — I25.10 ATHEROSCLEROTIC HEART DISEASE OF NATIVE CORONARY ARTERY WITHOUT ANGINA PECTORIS: ICD-10-CM

## 2025-08-24 DIAGNOSIS — F32.9 MAJOR DEPRESSIVE DISORDER, SINGLE EPISODE, UNSPECIFIED: ICD-10-CM

## 2025-08-24 LAB
ANION GAP SERPL CALC-SCNC: 11 MMOL/L — SIGNIFICANT CHANGE UP (ref 7–14)
APPEARANCE UR: ABNORMAL
APTT BLD: 28.9 SEC — SIGNIFICANT CHANGE UP (ref 27–39.2)
BASOPHILS # BLD AUTO: 0.06 K/UL — SIGNIFICANT CHANGE UP (ref 0–0.2)
BASOPHILS NFR BLD AUTO: 0.7 % — SIGNIFICANT CHANGE UP (ref 0–1)
BILIRUB UR-MCNC: NEGATIVE — SIGNIFICANT CHANGE UP
BUN SERPL-MCNC: 9 MG/DL — LOW (ref 10–20)
CALCIUM SERPL-MCNC: 9.7 MG/DL — SIGNIFICANT CHANGE UP (ref 8.4–10.5)
CHLORIDE SERPL-SCNC: 89 MMOL/L — LOW (ref 98–110)
CO2 SERPL-SCNC: 25 MMOL/L — SIGNIFICANT CHANGE UP (ref 17–32)
COLOR SPEC: ABNORMAL
CREAT SERPL-MCNC: 0.5 MG/DL — LOW (ref 0.7–1.5)
DIFF PNL FLD: ABNORMAL
EGFR: 91 ML/MIN/1.73M2 — SIGNIFICANT CHANGE UP
EGFR: 91 ML/MIN/1.73M2 — SIGNIFICANT CHANGE UP
EOSINOPHIL # BLD AUTO: 0.2 K/UL — SIGNIFICANT CHANGE UP (ref 0–0.7)
EOSINOPHIL NFR BLD AUTO: 2.2 % — SIGNIFICANT CHANGE UP (ref 0–8)
GLUCOSE SERPL-MCNC: 106 MG/DL — HIGH (ref 70–99)
GLUCOSE UR QL: NEGATIVE MG/DL — SIGNIFICANT CHANGE UP
HCT VFR BLD CALC: 29.7 % — LOW (ref 37–47)
HGB BLD-MCNC: 10.1 G/DL — LOW (ref 12–16)
IMM GRANULOCYTES NFR BLD AUTO: 0.8 % — HIGH (ref 0.1–0.3)
INR BLD: 1.61 RATIO — HIGH (ref 0.65–1.3)
KETONES UR QL: 15 MG/DL
LACTATE SERPL-SCNC: 1 MMOL/L — SIGNIFICANT CHANGE UP (ref 0.7–2)
LEUKOCYTE ESTERASE UR-ACNC: ABNORMAL
LYMPHOCYTES # BLD AUTO: 0.81 K/UL — LOW (ref 1.2–3.4)
LYMPHOCYTES # BLD AUTO: 9 % — LOW (ref 20.5–51.1)
MCHC RBC-ENTMCNC: 28.9 PG — SIGNIFICANT CHANGE UP (ref 27–31)
MCHC RBC-ENTMCNC: 34 G/DL — SIGNIFICANT CHANGE UP (ref 32–37)
MCV RBC AUTO: 85.1 FL — SIGNIFICANT CHANGE UP (ref 81–99)
MONOCYTES # BLD AUTO: 0.85 K/UL — HIGH (ref 0.1–0.6)
MONOCYTES NFR BLD AUTO: 9.4 % — HIGH (ref 1.7–9.3)
NEUTROPHILS # BLD AUTO: 7.03 K/UL — HIGH (ref 1.4–6.5)
NEUTROPHILS NFR BLD AUTO: 77.9 % — HIGH (ref 42.2–75.2)
NITRITE UR-MCNC: NEGATIVE — SIGNIFICANT CHANGE UP
NRBC BLD AUTO-RTO: 0 /100 WBCS — SIGNIFICANT CHANGE UP (ref 0–0)
OSMOLALITY SERPL: 259 MOS/KG — LOW (ref 280–301)
PH UR: 8 — SIGNIFICANT CHANGE UP (ref 5–8)
PLATELET # BLD AUTO: 174 K/UL — SIGNIFICANT CHANGE UP (ref 130–400)
PMV BLD: 10.1 FL — SIGNIFICANT CHANGE UP (ref 7.4–10.4)
POTASSIUM SERPL-MCNC: 3.4 MMOL/L — LOW (ref 3.5–5)
POTASSIUM SERPL-SCNC: 3.4 MMOL/L — LOW (ref 3.5–5)
PROT UR-MCNC: 300 MG/DL
PROTHROM AB SERPL-ACNC: 19.2 SEC — HIGH (ref 9.95–12.87)
RBC # BLD: 3.49 M/UL — LOW (ref 4.2–5.4)
RBC # FLD: 13.8 % — SIGNIFICANT CHANGE UP (ref 11.5–14.5)
SODIUM SERPL-SCNC: 125 MMOL/L — LOW (ref 135–146)
SP GR SPEC: 1.02 — SIGNIFICANT CHANGE UP (ref 1–1.03)
UROBILINOGEN FLD QL: 1 MG/DL — SIGNIFICANT CHANGE UP (ref 0.2–1)
WBC # BLD: 9.02 K/UL — SIGNIFICANT CHANGE UP (ref 4.8–10.8)
WBC # FLD AUTO: 9.02 K/UL — SIGNIFICANT CHANGE UP (ref 4.8–10.8)

## 2025-08-24 PROCEDURE — 80053 COMPREHEN METABOLIC PANEL: CPT

## 2025-08-24 PROCEDURE — 93010 ELECTROCARDIOGRAM REPORT: CPT

## 2025-08-24 PROCEDURE — 99285 EMERGENCY DEPT VISIT HI MDM: CPT | Mod: FS

## 2025-08-24 PROCEDURE — 85610 PROTHROMBIN TIME: CPT

## 2025-08-24 PROCEDURE — 85025 COMPLETE CBC W/AUTO DIFF WBC: CPT

## 2025-08-24 PROCEDURE — 83605 ASSAY OF LACTIC ACID: CPT

## 2025-08-24 PROCEDURE — 85730 THROMBOPLASTIN TIME PARTIAL: CPT

## 2025-08-24 PROCEDURE — 87040 BLOOD CULTURE FOR BACTERIA: CPT

## 2025-08-24 PROCEDURE — 99223 1ST HOSP IP/OBS HIGH 75: CPT

## 2025-08-24 PROCEDURE — 36415 COLL VENOUS BLD VENIPUNCTURE: CPT

## 2025-08-24 PROCEDURE — 83930 ASSAY OF BLOOD OSMOLALITY: CPT

## 2025-08-24 PROCEDURE — 83735 ASSAY OF MAGNESIUM: CPT

## 2025-08-24 PROCEDURE — 80048 BASIC METABOLIC PNL TOTAL CA: CPT

## 2025-08-24 RX ORDER — MELATONIN 5 MG
3 TABLET ORAL AT BEDTIME
Refills: 0 | Status: DISCONTINUED | OUTPATIENT
Start: 2025-08-24 | End: 2025-08-27

## 2025-08-24 RX ORDER — BISACODYL 5 MG
5 TABLET, DELAYED RELEASE (ENTERIC COATED) ORAL EVERY 12 HOURS
Refills: 0 | Status: DISCONTINUED | OUTPATIENT
Start: 2025-08-24 | End: 2025-08-27

## 2025-08-24 RX ORDER — APIXABAN 2.5 MG/1
5 TABLET, FILM COATED ORAL
Refills: 0 | Status: DISCONTINUED | OUTPATIENT
Start: 2025-08-24 | End: 2025-08-27

## 2025-08-24 RX ORDER — ASPIRIN 325 MG
81 TABLET ORAL DAILY
Refills: 0 | Status: DISCONTINUED | OUTPATIENT
Start: 2025-08-24 | End: 2025-08-27

## 2025-08-24 RX ORDER — FOLIC ACID 1 MG/1
1 TABLET ORAL DAILY
Refills: 0 | Status: DISCONTINUED | OUTPATIENT
Start: 2025-08-24 | End: 2025-08-27

## 2025-08-24 RX ORDER — FUROSEMIDE 10 MG/ML
40 INJECTION INTRAMUSCULAR; INTRAVENOUS DAILY
Refills: 0 | Status: DISCONTINUED | OUTPATIENT
Start: 2025-08-24 | End: 2025-08-24

## 2025-08-24 RX ORDER — METOPROLOL SUCCINATE 50 MG/1
50 TABLET, EXTENDED RELEASE ORAL DAILY
Refills: 0 | Status: DISCONTINUED | OUTPATIENT
Start: 2025-08-24 | End: 2025-08-27

## 2025-08-24 RX ORDER — SODIUM CHLORIDE 9 G/1000ML
500 INJECTION, SOLUTION INTRAVENOUS ONCE
Refills: 0 | Status: COMPLETED | OUTPATIENT
Start: 2025-08-24 | End: 2025-08-24

## 2025-08-24 RX ORDER — ONDANSETRON HCL/PF 4 MG/2 ML
4 VIAL (ML) INJECTION EVERY 8 HOURS
Refills: 0 | Status: DISCONTINUED | OUTPATIENT
Start: 2025-08-24 | End: 2025-08-27

## 2025-08-24 RX ORDER — FUROSEMIDE 10 MG/ML
20 INJECTION INTRAMUSCULAR; INTRAVENOUS
Refills: 0 | Status: DISCONTINUED | OUTPATIENT
Start: 2025-08-24 | End: 2025-08-25

## 2025-08-24 RX ORDER — MAGNESIUM, ALUMINUM HYDROXIDE 200-200 MG
30 TABLET,CHEWABLE ORAL EVERY 4 HOURS
Refills: 0 | Status: DISCONTINUED | OUTPATIENT
Start: 2025-08-24 | End: 2025-08-27

## 2025-08-24 RX ORDER — METOPROLOL SUCCINATE 50 MG/1
50 TABLET, EXTENDED RELEASE ORAL DAILY
Refills: 0 | Status: DISCONTINUED | OUTPATIENT
Start: 2025-08-24 | End: 2025-08-24

## 2025-08-24 RX ORDER — BISACODYL 5 MG
1 TABLET, DELAYED RELEASE (ENTERIC COATED) ORAL
Refills: 0 | DISCHARGE

## 2025-08-24 RX ORDER — ACETAMINOPHEN 500 MG/5ML
650 LIQUID (ML) ORAL EVERY 6 HOURS
Refills: 0 | Status: DISCONTINUED | OUTPATIENT
Start: 2025-08-24 | End: 2025-08-27

## 2025-08-24 RX ORDER — CEFTRIAXONE 500 MG/1
2000 INJECTION, POWDER, FOR SOLUTION INTRAMUSCULAR; INTRAVENOUS ONCE
Refills: 0 | Status: COMPLETED | OUTPATIENT
Start: 2025-08-24 | End: 2025-08-24

## 2025-08-24 RX ORDER — AMLODIPINE BESYLATE 10 MG/1
5 TABLET ORAL DAILY
Refills: 0 | Status: DISCONTINUED | OUTPATIENT
Start: 2025-08-24 | End: 2025-08-27

## 2025-08-24 RX ORDER — FUROSEMIDE 10 MG/ML
40 INJECTION INTRAMUSCULAR; INTRAVENOUS
Refills: 0 | Status: DISCONTINUED | OUTPATIENT
Start: 2025-08-24 | End: 2025-08-25

## 2025-08-24 RX ORDER — CEFTRIAXONE 500 MG/1
INJECTION, POWDER, FOR SOLUTION INTRAMUSCULAR; INTRAVENOUS
Refills: 0 | Status: DISCONTINUED | OUTPATIENT
Start: 2025-08-24 | End: 2025-08-24

## 2025-08-24 RX ORDER — SENNA 187 MG
1 TABLET ORAL
Refills: 0 | DISCHARGE

## 2025-08-24 RX ORDER — CEFTRIAXONE 500 MG/1
1000 INJECTION, POWDER, FOR SOLUTION INTRAMUSCULAR; INTRAVENOUS EVERY 24 HOURS
Refills: 0 | Status: COMPLETED | OUTPATIENT
Start: 2025-08-25 | End: 2025-08-27

## 2025-08-24 RX ORDER — SENNA 187 MG
2 TABLET ORAL AT BEDTIME
Refills: 0 | Status: DISCONTINUED | OUTPATIENT
Start: 2025-08-24 | End: 2025-08-27

## 2025-08-24 RX ORDER — DULOXETINE 20 MG/1
20 CAPSULE, DELAYED RELEASE ORAL DAILY
Refills: 0 | Status: DISCONTINUED | OUTPATIENT
Start: 2025-08-24 | End: 2025-08-27

## 2025-08-24 RX ORDER — AMLODIPINE BESYLATE 10 MG/1
5 TABLET ORAL DAILY
Refills: 0 | Status: DISCONTINUED | OUTPATIENT
Start: 2025-08-24 | End: 2025-08-24

## 2025-08-24 RX ADMIN — CEFTRIAXONE 100 MILLIGRAM(S): 500 INJECTION, POWDER, FOR SOLUTION INTRAMUSCULAR; INTRAVENOUS at 17:50

## 2025-08-24 RX ADMIN — SODIUM CHLORIDE 500 MILLILITER(S): 9 INJECTION, SOLUTION INTRAVENOUS at 18:05

## 2025-08-24 RX ADMIN — Medication 2 TABLET(S): at 21:05

## 2025-08-25 LAB
ANION GAP SERPL CALC-SCNC: 14 MMOL/L — SIGNIFICANT CHANGE UP (ref 7–14)
ANION GAP SERPL CALC-SCNC: 14 MMOL/L — SIGNIFICANT CHANGE UP (ref 7–14)
BASOPHILS # BLD AUTO: 0.05 K/UL — SIGNIFICANT CHANGE UP (ref 0–0.2)
BASOPHILS NFR BLD AUTO: 0.6 % — SIGNIFICANT CHANGE UP (ref 0–1)
BUN SERPL-MCNC: 7 MG/DL — LOW (ref 10–20)
BUN SERPL-MCNC: 8 MG/DL — LOW (ref 10–20)
CALCIUM SERPL-MCNC: 9.3 MG/DL — SIGNIFICANT CHANGE UP (ref 8.4–10.5)
CALCIUM SERPL-MCNC: 9.4 MG/DL — SIGNIFICANT CHANGE UP (ref 8.4–10.5)
CHLORIDE SERPL-SCNC: 91 MMOL/L — LOW (ref 98–110)
CHLORIDE SERPL-SCNC: 91 MMOL/L — LOW (ref 98–110)
CO2 SERPL-SCNC: 22 MMOL/L — SIGNIFICANT CHANGE UP (ref 17–32)
CO2 SERPL-SCNC: 23 MMOL/L — SIGNIFICANT CHANGE UP (ref 17–32)
CREAT SERPL-MCNC: 0.5 MG/DL — LOW (ref 0.7–1.5)
CREAT SERPL-MCNC: <0.5 MG/DL — LOW (ref 0.7–1.5)
CULTURE RESULTS: SIGNIFICANT CHANGE UP
EGFR: 91 ML/MIN/1.73M2 — SIGNIFICANT CHANGE UP
EGFR: 91 ML/MIN/1.73M2 — SIGNIFICANT CHANGE UP
EGFR: 96 ML/MIN/1.73M2 — SIGNIFICANT CHANGE UP
EGFR: 96 ML/MIN/1.73M2 — SIGNIFICANT CHANGE UP
EOSINOPHIL # BLD AUTO: 0.28 K/UL — SIGNIFICANT CHANGE UP (ref 0–0.7)
EOSINOPHIL NFR BLD AUTO: 3.6 % — SIGNIFICANT CHANGE UP (ref 0–8)
GLUCOSE SERPL-MCNC: 95 MG/DL — SIGNIFICANT CHANGE UP (ref 70–99)
GLUCOSE SERPL-MCNC: 97 MG/DL — SIGNIFICANT CHANGE UP (ref 70–99)
HCT VFR BLD CALC: 27.3 % — LOW (ref 37–47)
HGB BLD-MCNC: 9.3 G/DL — LOW (ref 12–16)
IMM GRANULOCYTES NFR BLD AUTO: 0.8 % — HIGH (ref 0.1–0.3)
LYMPHOCYTES # BLD AUTO: 1.05 K/UL — LOW (ref 1.2–3.4)
LYMPHOCYTES # BLD AUTO: 13.4 % — LOW (ref 20.5–51.1)
MCHC RBC-ENTMCNC: 29.2 PG — SIGNIFICANT CHANGE UP (ref 27–31)
MCHC RBC-ENTMCNC: 34.1 G/DL — SIGNIFICANT CHANGE UP (ref 32–37)
MCV RBC AUTO: 85.8 FL — SIGNIFICANT CHANGE UP (ref 81–99)
MONOCYTES # BLD AUTO: 0.94 K/UL — HIGH (ref 0.1–0.6)
MONOCYTES NFR BLD AUTO: 12 % — HIGH (ref 1.7–9.3)
NEUTROPHILS # BLD AUTO: 5.46 K/UL — SIGNIFICANT CHANGE UP (ref 1.4–6.5)
NEUTROPHILS NFR BLD AUTO: 69.6 % — SIGNIFICANT CHANGE UP (ref 42.2–75.2)
NRBC BLD AUTO-RTO: 0 /100 WBCS — SIGNIFICANT CHANGE UP (ref 0–0)
PLATELET # BLD AUTO: 162 K/UL — SIGNIFICANT CHANGE UP (ref 130–400)
PMV BLD: 10.6 FL — HIGH (ref 7.4–10.4)
POTASSIUM SERPL-MCNC: 3.4 MMOL/L — LOW (ref 3.5–5)
POTASSIUM SERPL-MCNC: 3.7 MMOL/L — SIGNIFICANT CHANGE UP (ref 3.5–5)
POTASSIUM SERPL-SCNC: 3.4 MMOL/L — LOW (ref 3.5–5)
POTASSIUM SERPL-SCNC: 3.7 MMOL/L — SIGNIFICANT CHANGE UP (ref 3.5–5)
RBC # BLD: 3.18 M/UL — LOW (ref 4.2–5.4)
RBC # FLD: 13.7 % — SIGNIFICANT CHANGE UP (ref 11.5–14.5)
SODIUM SERPL-SCNC: 127 MMOL/L — LOW (ref 135–146)
SODIUM SERPL-SCNC: 128 MMOL/L — LOW (ref 135–146)
SPECIMEN SOURCE: SIGNIFICANT CHANGE UP
WBC # BLD: 7.84 K/UL — SIGNIFICANT CHANGE UP (ref 4.8–10.8)
WBC # FLD AUTO: 7.84 K/UL — SIGNIFICANT CHANGE UP (ref 4.8–10.8)

## 2025-08-25 PROCEDURE — 99232 SBSQ HOSP IP/OBS MODERATE 35: CPT

## 2025-08-25 RX ADMIN — Medication 20 MILLIEQUIVALENT(S): at 11:17

## 2025-08-25 RX ADMIN — Medication 75 MILLILITER(S): at 11:22

## 2025-08-25 RX ADMIN — METOPROLOL SUCCINATE 50 MILLIGRAM(S): 50 TABLET, EXTENDED RELEASE ORAL at 05:10

## 2025-08-25 RX ADMIN — Medication 81 MILLIGRAM(S): at 11:19

## 2025-08-25 RX ADMIN — APIXABAN 5 MILLIGRAM(S): 2.5 TABLET, FILM COATED ORAL at 05:10

## 2025-08-25 RX ADMIN — FUROSEMIDE 40 MILLIGRAM(S): 10 INJECTION INTRAMUSCULAR; INTRAVENOUS at 06:00

## 2025-08-25 RX ADMIN — DULOXETINE 20 MILLIGRAM(S): 20 CAPSULE, DELAYED RELEASE ORAL at 11:22

## 2025-08-25 RX ADMIN — AMLODIPINE BESYLATE 5 MILLIGRAM(S): 10 TABLET ORAL at 05:11

## 2025-08-25 RX ADMIN — APIXABAN 5 MILLIGRAM(S): 2.5 TABLET, FILM COATED ORAL at 17:24

## 2025-08-25 RX ADMIN — CEFTRIAXONE 100 MILLIGRAM(S): 500 INJECTION, POWDER, FOR SOLUTION INTRAMUSCULAR; INTRAVENOUS at 17:24

## 2025-08-25 RX ADMIN — FOLIC ACID 1 MILLIGRAM(S): 1 TABLET ORAL at 11:19

## 2025-08-25 RX ADMIN — Medication 75 MILLILITER(S): at 22:28

## 2025-08-25 RX ADMIN — Medication 650 MILLIGRAM(S): at 14:38

## 2025-08-25 RX ADMIN — Medication 1 APPLICATION(S): at 11:16

## 2025-08-25 RX ADMIN — Medication 2 TABLET(S): at 21:08

## 2025-08-26 LAB
ALBUMIN SERPL ELPH-MCNC: 3.5 G/DL — SIGNIFICANT CHANGE UP (ref 3.5–5.2)
ALP SERPL-CCNC: 68 U/L — SIGNIFICANT CHANGE UP (ref 30–115)
ALT FLD-CCNC: 11 U/L — SIGNIFICANT CHANGE UP (ref 0–41)
ANION GAP SERPL CALC-SCNC: 11 MMOL/L — SIGNIFICANT CHANGE UP (ref 7–14)
ANION GAP SERPL CALC-SCNC: 12 MMOL/L — SIGNIFICANT CHANGE UP (ref 7–14)
AST SERPL-CCNC: 20 U/L — SIGNIFICANT CHANGE UP (ref 0–41)
BASOPHILS # BLD AUTO: 0.06 K/UL — SIGNIFICANT CHANGE UP (ref 0–0.2)
BASOPHILS NFR BLD AUTO: 0.8 % — SIGNIFICANT CHANGE UP (ref 0–1)
BILIRUB SERPL-MCNC: 0.8 MG/DL — SIGNIFICANT CHANGE UP (ref 0.2–1.2)
BUN SERPL-MCNC: 5 MG/DL — LOW (ref 10–20)
BUN SERPL-MCNC: 5 MG/DL — LOW (ref 10–20)
CALCIUM SERPL-MCNC: 9.2 MG/DL — SIGNIFICANT CHANGE UP (ref 8.4–10.5)
CALCIUM SERPL-MCNC: 9.6 MG/DL — SIGNIFICANT CHANGE UP (ref 8.4–10.5)
CHLORIDE SERPL-SCNC: 94 MMOL/L — LOW (ref 98–110)
CHLORIDE SERPL-SCNC: 97 MMOL/L — LOW (ref 98–110)
CO2 SERPL-SCNC: 22 MMOL/L — SIGNIFICANT CHANGE UP (ref 17–32)
CO2 SERPL-SCNC: 23 MMOL/L — SIGNIFICANT CHANGE UP (ref 17–32)
CREAT SERPL-MCNC: <0.5 MG/DL — LOW (ref 0.7–1.5)
CREAT SERPL-MCNC: <0.5 MG/DL — LOW (ref 0.7–1.5)
EGFR: 96 ML/MIN/1.73M2 — SIGNIFICANT CHANGE UP
EOSINOPHIL # BLD AUTO: 0.29 K/UL — SIGNIFICANT CHANGE UP (ref 0–0.7)
EOSINOPHIL NFR BLD AUTO: 4 % — SIGNIFICANT CHANGE UP (ref 0–8)
GLUCOSE SERPL-MCNC: 110 MG/DL — HIGH (ref 70–99)
GLUCOSE SERPL-MCNC: 87 MG/DL — SIGNIFICANT CHANGE UP (ref 70–99)
HCT VFR BLD CALC: 28.4 % — LOW (ref 37–47)
HGB BLD-MCNC: 9.4 G/DL — LOW (ref 12–16)
IMM GRANULOCYTES NFR BLD AUTO: 0.7 % — HIGH (ref 0.1–0.3)
LYMPHOCYTES # BLD AUTO: 1.05 K/UL — LOW (ref 1.2–3.4)
LYMPHOCYTES # BLD AUTO: 14.4 % — LOW (ref 20.5–51.1)
MAGNESIUM SERPL-MCNC: 1.8 MG/DL — SIGNIFICANT CHANGE UP (ref 1.8–2.4)
MCHC RBC-ENTMCNC: 29.1 PG — SIGNIFICANT CHANGE UP (ref 27–31)
MCHC RBC-ENTMCNC: 33.1 G/DL — SIGNIFICANT CHANGE UP (ref 32–37)
MCV RBC AUTO: 87.9 FL — SIGNIFICANT CHANGE UP (ref 81–99)
MONOCYTES # BLD AUTO: 0.84 K/UL — HIGH (ref 0.1–0.6)
MONOCYTES NFR BLD AUTO: 11.5 % — HIGH (ref 1.7–9.3)
NEUTROPHILS # BLD AUTO: 5.02 K/UL — SIGNIFICANT CHANGE UP (ref 1.4–6.5)
NEUTROPHILS NFR BLD AUTO: 68.6 % — SIGNIFICANT CHANGE UP (ref 42.2–75.2)
NRBC BLD AUTO-RTO: 0 /100 WBCS — SIGNIFICANT CHANGE UP (ref 0–0)
PLATELET # BLD AUTO: 171 K/UL — SIGNIFICANT CHANGE UP (ref 130–400)
PMV BLD: 10.7 FL — HIGH (ref 7.4–10.4)
POTASSIUM SERPL-MCNC: 3.4 MMOL/L — LOW (ref 3.5–5)
POTASSIUM SERPL-MCNC: 3.9 MMOL/L — SIGNIFICANT CHANGE UP (ref 3.5–5)
POTASSIUM SERPL-SCNC: 3.4 MMOL/L — LOW (ref 3.5–5)
POTASSIUM SERPL-SCNC: 3.9 MMOL/L — SIGNIFICANT CHANGE UP (ref 3.5–5)
PROT SERPL-MCNC: 5.8 G/DL — LOW (ref 6–8)
RBC # BLD: 3.23 M/UL — LOW (ref 4.2–5.4)
RBC # FLD: 14 % — SIGNIFICANT CHANGE UP (ref 11.5–14.5)
SODIUM SERPL-SCNC: 128 MMOL/L — LOW (ref 135–146)
SODIUM SERPL-SCNC: 131 MMOL/L — LOW (ref 135–146)
WBC # BLD: 7.31 K/UL — SIGNIFICANT CHANGE UP (ref 4.8–10.8)
WBC # FLD AUTO: 7.31 K/UL — SIGNIFICANT CHANGE UP (ref 4.8–10.8)

## 2025-08-26 PROCEDURE — 99232 SBSQ HOSP IP/OBS MODERATE 35: CPT

## 2025-08-26 RX ADMIN — Medication 81 MILLIGRAM(S): at 11:14

## 2025-08-26 RX ADMIN — DULOXETINE 20 MILLIGRAM(S): 20 CAPSULE, DELAYED RELEASE ORAL at 11:13

## 2025-08-26 RX ADMIN — Medication 20 MILLIEQUIVALENT(S): at 13:34

## 2025-08-26 RX ADMIN — Medication 650 MILLIGRAM(S): at 19:16

## 2025-08-26 RX ADMIN — Medication 1 APPLICATION(S): at 11:13

## 2025-08-26 RX ADMIN — Medication 20 MILLIEQUIVALENT(S): at 15:15

## 2025-08-26 RX ADMIN — FOLIC ACID 1 MILLIGRAM(S): 1 TABLET ORAL at 11:14

## 2025-08-26 RX ADMIN — Medication 650 MILLIGRAM(S): at 18:38

## 2025-08-26 RX ADMIN — Medication 3 MILLIGRAM(S): at 21:39

## 2025-08-26 RX ADMIN — Medication 2 TABLET(S): at 21:39

## 2025-08-26 RX ADMIN — CEFTRIAXONE 100 MILLIGRAM(S): 500 INJECTION, POWDER, FOR SOLUTION INTRAMUSCULAR; INTRAVENOUS at 17:03

## 2025-08-26 RX ADMIN — METOPROLOL SUCCINATE 50 MILLIGRAM(S): 50 TABLET, EXTENDED RELEASE ORAL at 05:29

## 2025-08-26 RX ADMIN — AMLODIPINE BESYLATE 5 MILLIGRAM(S): 10 TABLET ORAL at 05:29

## 2025-08-26 RX ADMIN — APIXABAN 5 MILLIGRAM(S): 2.5 TABLET, FILM COATED ORAL at 05:29

## 2025-08-26 RX ADMIN — Medication 20 MILLIEQUIVALENT(S): at 11:13

## 2025-08-26 RX ADMIN — APIXABAN 5 MILLIGRAM(S): 2.5 TABLET, FILM COATED ORAL at 17:03

## 2025-08-26 RX ADMIN — Medication 50 MILLILITER(S): at 19:30

## 2025-08-27 ENCOUNTER — TRANSCRIPTION ENCOUNTER (OUTPATIENT)
Age: 85
End: 2025-08-27

## 2025-08-27 VITALS
RESPIRATION RATE: 18 BRPM | TEMPERATURE: 98 F | SYSTOLIC BLOOD PRESSURE: 143 MMHG | DIASTOLIC BLOOD PRESSURE: 96 MMHG | HEART RATE: 98 BPM

## 2025-08-27 LAB
ALBUMIN SERPL ELPH-MCNC: 3.8 G/DL — SIGNIFICANT CHANGE UP (ref 3.5–5.2)
ALP SERPL-CCNC: 68 U/L — SIGNIFICANT CHANGE UP (ref 30–115)
ALT FLD-CCNC: 11 U/L — SIGNIFICANT CHANGE UP (ref 0–41)
ANION GAP SERPL CALC-SCNC: 12 MMOL/L — SIGNIFICANT CHANGE UP (ref 7–14)
AST SERPL-CCNC: 18 U/L — SIGNIFICANT CHANGE UP (ref 0–41)
BASOPHILS # BLD AUTO: 0.08 K/UL — SIGNIFICANT CHANGE UP (ref 0–0.2)
BASOPHILS NFR BLD AUTO: 1 % — SIGNIFICANT CHANGE UP (ref 0–1)
BILIRUB SERPL-MCNC: 0.7 MG/DL — SIGNIFICANT CHANGE UP (ref 0.2–1.2)
BUN SERPL-MCNC: 5 MG/DL — LOW (ref 10–20)
CALCIUM SERPL-MCNC: 9.9 MG/DL — SIGNIFICANT CHANGE UP (ref 8.4–10.5)
CHLORIDE SERPL-SCNC: 97 MMOL/L — LOW (ref 98–110)
CO2 SERPL-SCNC: 23 MMOL/L — SIGNIFICANT CHANGE UP (ref 17–32)
CREAT SERPL-MCNC: <0.5 MG/DL — LOW (ref 0.7–1.5)
EGFR: 103 ML/MIN/1.73M2 — SIGNIFICANT CHANGE UP
EGFR: 103 ML/MIN/1.73M2 — SIGNIFICANT CHANGE UP
EOSINOPHIL # BLD AUTO: 0.29 K/UL — SIGNIFICANT CHANGE UP (ref 0–0.7)
EOSINOPHIL NFR BLD AUTO: 3.5 % — SIGNIFICANT CHANGE UP (ref 0–8)
GLUCOSE SERPL-MCNC: 91 MG/DL — SIGNIFICANT CHANGE UP (ref 70–99)
HCT VFR BLD CALC: 28.5 % — LOW (ref 37–47)
HGB BLD-MCNC: 9.5 G/DL — LOW (ref 12–16)
IMM GRANULOCYTES NFR BLD AUTO: 0.5 % — HIGH (ref 0.1–0.3)
LYMPHOCYTES # BLD AUTO: 1.27 K/UL — SIGNIFICANT CHANGE UP (ref 1.2–3.4)
LYMPHOCYTES # BLD AUTO: 15.5 % — LOW (ref 20.5–51.1)
MAGNESIUM SERPL-MCNC: 1.7 MG/DL — LOW (ref 1.8–2.4)
MCHC RBC-ENTMCNC: 29 PG — SIGNIFICANT CHANGE UP (ref 27–31)
MCHC RBC-ENTMCNC: 33.3 G/DL — SIGNIFICANT CHANGE UP (ref 32–37)
MCV RBC AUTO: 86.9 FL — SIGNIFICANT CHANGE UP (ref 81–99)
MONOCYTES # BLD AUTO: 0.98 K/UL — HIGH (ref 0.1–0.6)
MONOCYTES NFR BLD AUTO: 12 % — HIGH (ref 1.7–9.3)
NEUTROPHILS # BLD AUTO: 5.52 K/UL — SIGNIFICANT CHANGE UP (ref 1.4–6.5)
NEUTROPHILS NFR BLD AUTO: 67.5 % — SIGNIFICANT CHANGE UP (ref 42.2–75.2)
NRBC BLD AUTO-RTO: 0 /100 WBCS — SIGNIFICANT CHANGE UP (ref 0–0)
PLATELET # BLD AUTO: 193 K/UL — SIGNIFICANT CHANGE UP (ref 130–400)
PMV BLD: 10.5 FL — HIGH (ref 7.4–10.4)
POTASSIUM SERPL-MCNC: 4.4 MMOL/L — SIGNIFICANT CHANGE UP (ref 3.5–5)
POTASSIUM SERPL-SCNC: 4.4 MMOL/L — SIGNIFICANT CHANGE UP (ref 3.5–5)
PROT SERPL-MCNC: 5.8 G/DL — LOW (ref 6–8)
RBC # BLD: 3.28 M/UL — LOW (ref 4.2–5.4)
RBC # FLD: 13.9 % — SIGNIFICANT CHANGE UP (ref 11.5–14.5)
SODIUM SERPL-SCNC: 132 MMOL/L — LOW (ref 135–146)
WBC # BLD: 8.18 K/UL — SIGNIFICANT CHANGE UP (ref 4.8–10.8)
WBC # FLD AUTO: 8.18 K/UL — SIGNIFICANT CHANGE UP (ref 4.8–10.8)

## 2025-08-27 PROCEDURE — 99239 HOSP IP/OBS DSCHRG MGMT >30: CPT

## 2025-08-27 RX ORDER — FOLIC ACID 1 MG/1
1 TABLET ORAL
Qty: 30 | Refills: 0
Start: 2025-08-27 | End: 2025-09-25

## 2025-08-27 RX ORDER — FUROSEMIDE 10 MG/ML
1 INJECTION INTRAMUSCULAR; INTRAVENOUS
Qty: 13 | Refills: 0
Start: 2025-08-27 | End: 2025-09-25

## 2025-08-27 RX ORDER — MAGNESIUM SULFATE 500 MG/ML
1 SYRINGE (ML) INJECTION ONCE
Refills: 0 | Status: COMPLETED | OUTPATIENT
Start: 2025-08-27 | End: 2025-08-27

## 2025-08-27 RX ADMIN — APIXABAN 5 MILLIGRAM(S): 2.5 TABLET, FILM COATED ORAL at 05:26

## 2025-08-27 RX ADMIN — Medication 100 GRAM(S): at 12:30

## 2025-08-27 RX ADMIN — AMLODIPINE BESYLATE 5 MILLIGRAM(S): 10 TABLET ORAL at 05:26

## 2025-08-27 RX ADMIN — METOPROLOL SUCCINATE 50 MILLIGRAM(S): 50 TABLET, EXTENDED RELEASE ORAL at 05:26

## 2025-08-27 RX ADMIN — FOLIC ACID 1 MILLIGRAM(S): 1 TABLET ORAL at 12:31

## 2025-08-27 RX ADMIN — DULOXETINE 20 MILLIGRAM(S): 20 CAPSULE, DELAYED RELEASE ORAL at 12:31

## 2025-08-27 RX ADMIN — CEFTRIAXONE 100 MILLIGRAM(S): 500 INJECTION, POWDER, FOR SOLUTION INTRAMUSCULAR; INTRAVENOUS at 17:19

## 2025-08-27 RX ADMIN — Medication 1 APPLICATION(S): at 12:30

## 2025-08-27 RX ADMIN — Medication 81 MILLIGRAM(S): at 12:31

## 2025-08-27 RX ADMIN — Medication 650 MILLIGRAM(S): at 00:44

## 2025-08-27 RX ADMIN — APIXABAN 5 MILLIGRAM(S): 2.5 TABLET, FILM COATED ORAL at 17:19

## 2025-08-29 PROBLEM — N39.0 URINARY TRACT INFECTION, SITE NOT SPECIFIED: Chronic | Status: ACTIVE | Noted: 2025-08-24

## 2025-08-29 PROBLEM — E78.5 HYPERLIPIDEMIA, UNSPECIFIED: Chronic | Status: ACTIVE | Noted: 2025-08-24

## 2025-09-01 ENCOUNTER — APPOINTMENT (OUTPATIENT)
Dept: GERIATRICS | Facility: HOME HEALTH | Age: 85
End: 2025-09-01
Payer: MEDICARE

## 2025-09-01 VITALS
HEART RATE: 66 BPM | SYSTOLIC BLOOD PRESSURE: 120 MMHG | DIASTOLIC BLOOD PRESSURE: 64 MMHG | OXYGEN SATURATION: 97 % | TEMPERATURE: 98.2 F | RESPIRATION RATE: 16 BRPM

## 2025-09-01 DIAGNOSIS — I50.30 UNSPECIFIED DIASTOLIC (CONGESTIVE) HEART FAILURE: ICD-10-CM

## 2025-09-01 DIAGNOSIS — F03.90 UNSPECIFIED DEMENTIA W/OUT BEHAVIORAL DISTURBANCE: ICD-10-CM

## 2025-09-01 DIAGNOSIS — I10 ESSENTIAL (PRIMARY) HYPERTENSION: ICD-10-CM

## 2025-09-01 DIAGNOSIS — I48.91 UNSPECIFIED ATRIAL FIBRILLATION: ICD-10-CM

## 2025-09-01 DIAGNOSIS — E87.1 HYPO-OSMOLALITY AND HYPONATREMIA: ICD-10-CM

## 2025-09-01 DIAGNOSIS — G14 POSTPOLIO SYNDROME: ICD-10-CM

## 2025-09-01 DIAGNOSIS — E66.01 MORBID (SEVERE) OBESITY DUE TO EXCESS CALORIES: ICD-10-CM

## 2025-09-01 PROCEDURE — 99496 TRANSJ CARE MGMT HIGH F2F 7D: CPT

## 2025-09-01 RX ORDER — AMLODIPINE BESYLATE 5 MG/1
5 TABLET ORAL
Refills: 0 | Status: ACTIVE | COMMUNITY

## 2025-09-01 RX ORDER — LEVOFLOXACIN 500 MG/1
500 TABLET, FILM COATED ORAL DAILY
Qty: 5 | Refills: 0 | Status: ACTIVE | COMMUNITY
Start: 2025-09-01 | End: 1900-01-01

## 2025-09-20 DIAGNOSIS — L30.9 DERMATITIS, UNSPECIFIED: ICD-10-CM

## 2025-09-20 RX ORDER — CLOTRIMAZOLE 1 G/100G
1 CREAM TOPICAL TWICE DAILY
Qty: 1 | Refills: 6 | Status: ACTIVE | COMMUNITY
Start: 2025-09-20 | End: 1900-01-01

## 2025-09-22 PROBLEM — R14.0 ABDOMINAL DISTENSION: Status: ACTIVE | Noted: 2025-09-22

## 2025-09-22 PROBLEM — N93.9 VAGINAL BLEEDING: Status: ACTIVE | Noted: 2025-09-22
